# Patient Record
Sex: MALE | Race: WHITE | NOT HISPANIC OR LATINO | Employment: OTHER | URBAN - METROPOLITAN AREA
[De-identification: names, ages, dates, MRNs, and addresses within clinical notes are randomized per-mention and may not be internally consistent; named-entity substitution may affect disease eponyms.]

---

## 2017-03-20 ENCOUNTER — CONVERSION ENCOUNTER (OUTPATIENT)
Dept: OTHER | Facility: OTHER | Age: 67
End: 2017-03-20

## 2017-03-20 LAB
T4 FREE SERPL-MCNC: 1.36 NG/DL (ref 0.78–2.19)
TSH SERPL DL<=0.005 MIU/L-ACNC: 0.66 MIU/ML (ref 0.47–4.68)

## 2017-03-27 ENCOUNTER — OFFICE VISIT (OUTPATIENT)
Dept: FAMILY MEDICINE CLINIC | Facility: CLINIC | Age: 67
End: 2017-03-27

## 2017-03-27 ENCOUNTER — TRANSCRIBE ORDERS (OUTPATIENT)
Dept: LAB | Facility: HOSPITAL | Age: 67
End: 2017-03-27

## 2017-03-27 ENCOUNTER — LAB (OUTPATIENT)
Dept: LAB | Facility: HOSPITAL | Age: 67
End: 2017-03-27
Attending: INTERNAL MEDICINE

## 2017-03-27 ENCOUNTER — APPOINTMENT (OUTPATIENT)
Dept: LAB | Facility: HOSPITAL | Age: 67
End: 2017-03-27
Attending: INTERNAL MEDICINE

## 2017-03-27 VITALS
HEART RATE: 121 BPM | WEIGHT: 219 LBS | SYSTOLIC BLOOD PRESSURE: 148 MMHG | HEIGHT: 71 IN | DIASTOLIC BLOOD PRESSURE: 93 MMHG | BODY MASS INDEX: 30.66 KG/M2 | TEMPERATURE: 98.7 F | OXYGEN SATURATION: 96 % | RESPIRATION RATE: 16 BRPM

## 2017-03-27 DIAGNOSIS — R94.31 ABNORMAL EKG: ICD-10-CM

## 2017-03-27 DIAGNOSIS — E78.00 HYPERCHOLESTEROLEMIA: ICD-10-CM

## 2017-03-27 DIAGNOSIS — I15.2 HYPERTENSION DUE TO ENDOCRINE DISORDER: ICD-10-CM

## 2017-03-27 DIAGNOSIS — I15.2 ENDOCRINE HYPERTENSION: Primary | ICD-10-CM

## 2017-03-27 DIAGNOSIS — E05.90 HYPERTHYROIDISM: Primary | ICD-10-CM

## 2017-03-27 DIAGNOSIS — I15.2 ENDOCRINE HYPERTENSION: ICD-10-CM

## 2017-03-27 LAB
ALBUMIN SERPL-MCNC: 4.7 G/DL (ref 3.5–5)
ALBUMIN/GLOB SERPL: 1.3 G/DL (ref 1–2)
ALP SERPL-CCNC: 54 U/L (ref 38–126)
ALT SERPL W P-5'-P-CCNC: 58 U/L (ref 13–69)
ANION GAP SERPL CALCULATED.3IONS-SCNC: 13.9 MMOL/L
AST SERPL-CCNC: 34 U/L (ref 15–46)
BILIRUB SERPL-MCNC: 0.6 MG/DL (ref 0.2–1.3)
BUN BLD-MCNC: 14 MG/DL (ref 7–20)
BUN/CREAT SERPL: 12.7 (ref 6.3–21.9)
CALCIUM SPEC-SCNC: 9.8 MG/DL (ref 8.4–10.2)
CHLORIDE SERPL-SCNC: 105 MMOL/L (ref 98–107)
CO2 SERPL-SCNC: 28 MMOL/L (ref 26–30)
CREAT BLD-MCNC: 1.1 MG/DL (ref 0.6–1.3)
GFR SERPL CREATININE-BSD FRML MDRD: 67 ML/MIN/1.73
GLOBULIN UR ELPH-MCNC: 3.6 GM/DL
GLUCOSE BLD-MCNC: 94 MG/DL (ref 74–98)
POTASSIUM BLD-SCNC: 3.9 MMOL/L (ref 3.5–5.1)
PROT SERPL-MCNC: 8.3 G/DL (ref 6.3–8.2)
SODIUM BLD-SCNC: 143 MMOL/L (ref 137–145)
TROPONIN I SERPL-MCNC: <0.012 NG/ML (ref 0–0.03)

## 2017-03-27 PROCEDURE — 93000 ELECTROCARDIOGRAM COMPLETE: CPT | Performed by: INTERNAL MEDICINE

## 2017-03-27 PROCEDURE — 84484 ASSAY OF TROPONIN QUANT: CPT | Performed by: INTERNAL MEDICINE

## 2017-03-27 PROCEDURE — 99214 OFFICE O/P EST MOD 30 MIN: CPT | Performed by: INTERNAL MEDICINE

## 2017-03-27 PROCEDURE — 80053 COMPREHEN METABOLIC PANEL: CPT | Performed by: INTERNAL MEDICINE

## 2017-03-27 RX ORDER — CARVEDILOL 3.12 MG/1
3.12 TABLET ORAL 2 TIMES DAILY WITH MEALS
Qty: 60 TABLET | Refills: 5 | Status: SHIPPED | OUTPATIENT
Start: 2017-03-27 | End: 2018-06-04 | Stop reason: ALTCHOICE

## 2017-03-27 NOTE — PROGRESS NOTES
"Subjective   Patient ID: Mohsen Bucio is a 67 y.o. male Pt is here for management of multiple medical problems.    Chief Complaint   Patient presents with   • Hypertension     patient has had increase in his blood pressure levels x 3 months       History of Present Illness      Feels light headed.  X 2 month.   Thinks it is allergies.   Since last apt bp was high.  Remains elevated.   Night sweats.  Hot flash during the day.  Loose stools. Once a day.   No otc.      Witness apnea.  No sig fatigue.  Rested on getting up. occ snoring.        The following portions of the patient's history were reviewed and updated as appropriate: allergies, current medications, past family history, past medical history, past social history, past surgical history and problem list.      Review of Systems   Constitutional: Negative for fatigue.   Eyes: Positive for pain.   Respiratory: Negative for cough, shortness of breath and wheezing.    Cardiovascular: Positive for palpitations. Negative for chest pain.   Neurological: Positive for dizziness, light-headedness and headaches. Negative for facial asymmetry and weakness.       Objective     /93 (BP Location: Right arm, Patient Position: Sitting, Cuff Size: Adult)  Pulse (!) 121  Temp 98.7 °F (37.1 °C) (Oral)   Resp 16  Ht 71\" (180.3 cm)  Wt 219 lb (99.3 kg)  SpO2 96%  BMI 30.54 kg/m2  Physical Exam  General Appearance:    Alert, cooperative, no distress, appears stated age   Head:    Normocephalic, without obvious abnormality, atraumatic   Eyes:    PERRL, conjunctiva/corneas clear, EOM's intact           Ears:    Normal TM's and external ear canals, both ears   Nose:   Nares normal, septum midline, mucosa normal, no drainage    or sinus tenderness   Throat:   Lips, mucosa, and tongue normal; teeth and gums normal   Neck:   Supple, symmetrical, trachea midline, no adenopathy;        thyroid:  No enlargement/tenderness/nodules; no carotid    bruit or JVD   Back:     " Symmetric, no curvature, ROM normal, no CVA tenderness   Lungs:     Clear to auscultation bilaterally, respirations unlabored   Chest wall:    No tenderness or deformity   Heart:    Regular rate and rhythm, S1 and S2 normal, no murmur, rub   or gallop   Abdomen:     Soft, non-tender, bowel sounds active all four quadrants,     no masses, no organomegaly           Extremities:   Extremities normal, atraumatic, no cyanosis or edema   Pulses:   2+ and symmetric all extremities   Skin:   Skin color, texture, turgor normal, no rashes or lesions   Lymph nodes:   Cervical, supraclavicular, and axillary nodes normal   Neurologic:   CNII-XII intact. Normal strength, sensation and reflexes       throughout       Assessment/Plan     ECG 12 Lead  Date/Time: 3/27/2017 3:57 PM  Performed by: BENJAMIN CHERY  Authorized by: BENJAMIN CHERY   Comparison: not compared with previous ECG   Previous ECG: no previous ECG available  Rhythm: sinus tachycardia  BPM: 102  T depression: III                Mohsen was seen today for hypertension.    Diagnoses and all orders for this visit:    Hyperthyroidism    Hypercholesterolemia    Hypertension due to endocrine disorder  -     Catecholamine+VMA, 24-Hr Urine  -     Comprehensive Metabolic Panel  -     Metanephrines, Urine, 24 Hour  -     5 HIAA, Urine, Quantitative, Random  -     carvedilol (COREG) 3.125 MG tablet; Take 1 tablet by mouth 2 (Two) Times a Day With Meals.    Abnormal EKG  -     Ambulatory Referral to Cardiology  -     Troponin I  -     ECG 12 Lead      Return in about 3 weeks (around 4/17/2017).                   There are no Patient Instructions on file for this visit.

## 2017-03-29 PROCEDURE — 83835 ASSAY OF METANEPHRINES: CPT | Performed by: INTERNAL MEDICINE

## 2017-03-29 PROCEDURE — 82384 ASSAY THREE CATECHOLAMINES: CPT | Performed by: INTERNAL MEDICINE

## 2017-03-29 PROCEDURE — 81050 URINALYSIS VOLUME MEASURE: CPT | Performed by: INTERNAL MEDICINE

## 2017-03-29 PROCEDURE — 84585 ASSAY OF URINE VMA: CPT | Performed by: INTERNAL MEDICINE

## 2017-04-01 LAB
METANEPHS 24H UR-MRATE: 196 UG/24 HR (ref 45–290)
METANEPHS UR-MCNC: 128 UG/L
NORMETANEPHRINE 24H UR-MCNC: 221 UG/L
NORMETANEPHRINE 24H UR-MRATE: 339 UG/24 HR (ref 82–500)

## 2017-04-02 LAB
DOPAMINE 24H UR-MRATE: 309 UG/24 HR (ref 0–510)
DOPAMINE UR-MCNC: 201 UG/L
EPINEPH 24H UR-MRATE: 9 UG/24 HR (ref 0–20)
EPINEPH UR-MCNC: 6 UG/L
NOREPINEPH 24H UR-MRATE: 61 UG/24 HR (ref 0–135)
NOREPINEPH UR-MCNC: 40 UG/L
VMA 24H UR-MRATE: 4.8 MG/24 HR (ref 0–7.5)
VMA UR-MCNC: 3.1 MG/L

## 2017-04-05 ENCOUNTER — TELEPHONE (OUTPATIENT)
Dept: FAMILY MEDICINE CLINIC | Facility: CLINIC | Age: 67
End: 2017-04-05

## 2017-04-05 NOTE — TELEPHONE ENCOUNTER
Dr. Allan, I talked to Mr. Bucio in regards to his lab results, he stated he has not had anymore episodes but he wants to wait and discuss repeating the test when he sees you in a couple of weeks.

## 2017-04-19 ENCOUNTER — OFFICE VISIT (OUTPATIENT)
Dept: FAMILY MEDICINE CLINIC | Facility: CLINIC | Age: 67
End: 2017-04-19

## 2017-04-19 VITALS
SYSTOLIC BLOOD PRESSURE: 140 MMHG | DIASTOLIC BLOOD PRESSURE: 85 MMHG | RESPIRATION RATE: 16 BRPM | HEART RATE: 104 BPM | OXYGEN SATURATION: 98 % | HEIGHT: 71 IN | TEMPERATURE: 97.6 F | WEIGHT: 213 LBS | BODY MASS INDEX: 29.82 KG/M2

## 2017-04-19 DIAGNOSIS — J30.1 SEASONAL ALLERGIC RHINITIS DUE TO POLLEN: ICD-10-CM

## 2017-04-19 DIAGNOSIS — E78.00 HYPERCHOLESTEROLEMIA: Primary | ICD-10-CM

## 2017-04-19 DIAGNOSIS — E05.90 HYPERTHYROIDISM: ICD-10-CM

## 2017-04-19 DIAGNOSIS — E53.8 COBALAMIN DEFICIENCY: ICD-10-CM

## 2017-04-19 DIAGNOSIS — E88.09 HYPERPROTEINEMIA: ICD-10-CM

## 2017-04-19 DIAGNOSIS — E03.9 ACQUIRED HYPOTHYROIDISM: ICD-10-CM

## 2017-04-19 LAB
ALBUMIN SERPL-MCNC: 4.7 G/DL (ref 3.5–5)
ALBUMIN/GLOB SERPL: 1.3 G/DL (ref 1–2)
ALP SERPL-CCNC: 62 U/L (ref 38–126)
ALT SERPL-CCNC: 62 U/L (ref 13–69)
AST SERPL-CCNC: 36 U/L (ref 15–46)
BILIRUB SERPL-MCNC: 0.9 MG/DL (ref 0.2–1.3)
BUN SERPL-MCNC: 15 MG/DL (ref 7–20)
BUN/CREAT SERPL: 13.6 (ref 6.3–21.9)
CALCIUM SERPL-MCNC: 10.2 MG/DL (ref 8.4–10.2)
CHLORIDE SERPL-SCNC: 102 MMOL/L (ref 98–107)
CO2 SERPL-SCNC: 28 MMOL/L (ref 26–30)
CREAT SERPL-MCNC: 1.1 MG/DL (ref 0.6–1.3)
GLOBULIN SER CALC-MCNC: 3.5 GM/DL
GLUCOSE SERPL-MCNC: 99 MG/DL (ref 74–98)
POTASSIUM SERPL-SCNC: 5.3 MMOL/L (ref 3.5–5.1)
PROT SERPL-MCNC: 8.2 G/DL (ref 6.3–8.2)
SODIUM SERPL-SCNC: 143 MMOL/L (ref 137–145)

## 2017-04-19 PROCEDURE — G0402 INITIAL PREVENTIVE EXAM: HCPCS | Performed by: INTERNAL MEDICINE

## 2017-04-19 NOTE — PROGRESS NOTES
"Subjective   Patient ID: Mohsen Bucio is a 67 y.o. male Pt is here for management of multiple medical problems.    Chief Complaint   Patient presents with   • Hypertension     follow-up, blood pressure is up today,  patient has not been taking Carvedilol since 4/3/17.   • Hyperlipidemia     follow-up   • Hyperthyrodism     follo-up       History of Present Illness    Pt with elevated BP. Seen Dr Alvarado.  No meds adjustments.    No weakness no dizzyness. Of coreg for the past 1-2 weeks.       The following portions of the patient's history were reviewed and updated as appropriate: allergies, current medications, past family history, past medical history, past social history, past surgical history and problem list.      Review of Systems   Constitutional: Negative for fatigue.   HENT: Positive for congestion and postnasal drip.    All other systems reviewed and are negative.      Objective     /85  Pulse 104  Temp 97.6 °F (36.4 °C) (Oral)   Resp 16  Ht 71\" (180.3 cm)  Wt 213 lb (96.6 kg)  SpO2 98%  BMI 29.71 kg/m2  Physical Exam  General Appearance:    Alert, cooperative, no distress, appears stated age   Head:    Normocephalic, without obvious abnormality, atraumatic   Eyes:    PERRL, conjunctiva/corneas clear, EOM's intact           Ears:    Normal TM's and external ear canals, both ears   Nose:   Nares normal, septum midline, mucosa normal, no drainage    or sinus tenderness   Throat:   Lips, mucosa, and tongue normal; teeth and gums normal   Neck:   Supple, symmetrical, trachea midline, no adenopathy;        thyroid:  No enlargement/tenderness/nodules; no carotid    bruit or JVD   Back:     Symmetric, no curvature, ROM normal, no CVA tenderness   Lungs:     Clear to auscultation bilaterally, respirations unlabored   Chest wall:    No tenderness or deformity   Heart:    Regular rate and rhythm, S1 and S2 normal, no murmur, rub   or gallop   Abdomen:     Soft, non-tender, bowel sounds active " all four quadrants,     no masses, no organomegaly           Extremities:   Extremities normal, atraumatic, no cyanosis or edema   Pulses:   2+ and symmetric all extremities   Skin:   Skin color, texture, turgor normal, no rashes or lesions   Lymph nodes:   Cervical, supraclavicular, and axillary nodes normal   Neurologic:   CNII-XII intact. Normal strength, sensation and reflexes       throughout       Assessment/Plan     Get back on coreg.     Hyperproteinemia. recheck TP if elevated get spep.  Labs discussed.      Mohsen was seen today for hypertension, hyperlipidemia and hyperthyrodism.    Diagnoses and all orders for this visit:    Hypercholesterolemia    Hyperthyroidism    Acquired hypothyroidism    Cobalamin deficiency    Hyperproteinemia  -     Comprehensive Metabolic Panel    Return in about 6 months (around 10/19/2017).                   There are no Patient Instructions on file for this visit.

## 2017-04-19 NOTE — PROGRESS NOTES
QUICK REFERENCE INFORMATION:  The ABCs of the Annual Wellness Visit    Initial Medicare Wellness Visit    HEALTH RISK ASSESSMENT    1950    Recent Hospitalizations:  No recent hospitalization(s)..        Current Medical Providers:  Patient Care Team:  Johnathon Allan MD as PCP - General        Smoking Status:  History   Smoking Status   • Never Smoker   Smokeless Tobacco   • Never Used       Alcohol Consumption:  History   Alcohol Use   • Yes     Comment: history       Depression Screen:   No flowsheet data found.    Health Habits and Functional and Cognitive Screening:  No flowsheet data found.               Does the patient have evidence of cognitive impairment? No    Asiprin use counseling: Taking ASA appropriately as indicated      Recent Lab Results:    Visual Acuity:  No exam data present    Age-appropriate Screening Schedule:  Refer to the list below for future screening recommendations based on patient's age, sex and/or medical conditions. Orders for these recommended tests are listed in the plan section. The patient has been provided with a written plan.    Health Maintenance   Topic Date Due   • ZOSTER VACCINE  07/18/2016   • PNEUMOCOCCAL VACCINES (65+ LOW/MEDIUM RISK) (1 of 2 - PCV13) 02/07/2017   • LIPID PANEL  11/29/2017   • TDAP/TD VACCINES (2 - Td) 12/07/2021   • COLONOSCOPY  09/07/2026   • INFLUENZA VACCINE  Addressed        Subjective   History of Present Illness    Mohsen Bucio is a 67 y.o. male who presents for an Annual Wellness Visit.    The following portions of the patient's history were reviewed and updated as appropriate: allergies, current medications, past family history, past medical history, past social history, past surgical history and problem list.    Outpatient Medications Prior to Visit   Medication Sig Dispense Refill   • budesonide-formoterol (SYMBICORT) 160-4.5 MCG/ACT inhaler Inhale 2 puffs 2 (two) times a day. 1 inhaler 11   • Cyanocobalamin 2500 MCG sublingual  "tablet Daily 90 each 3   • zafirlukast (ACCOLATE) 20 MG tablet Take 1 tablet by mouth 2 (two) times a day. 60 tablet 11   • albuterol (PROVENTIL HFA;VENTOLIN HFA) 108 (90 BASE) MCG/ACT inhaler Inhale 2 puffs Every 4 (Four) Hours As Needed for wheezing. 1 inhaler 11   • aspirin 81 MG tablet Take  by mouth daily.     • carvedilol (COREG) 3.125 MG tablet Take 1 tablet by mouth 2 (Two) Times a Day With Meals. 60 tablet 5   • fluticasone (FLONASE) 50 MCG/ACT nasal spray into each nostril daily.     • ipratropium (ATROVENT) 0.06 % nasal spray into each nostril.     • pneumococcal conj. 13-valent (PREVNAR 13) vaccine Apply  to cheek.       No facility-administered medications prior to visit.        Patient Active Problem List   Diagnosis   • Atopic rhinitis   • Asthma   • Chronic cough   • Hypercholesterolemia   • Hyperthyroidism   • Hypothyroidism   • Uninodular goiter   • Multiple thyroid nodules   • Cobalamin deficiency       Advance Care Planning:  has an advance directive - a copy HAS NOT been provided. Have asked the patient to send this to us to add to record.    Identification of Risk Factors:  Risk factors include: weight , cardiovascular risk and polypharmacy.    Review of Systems    Compared to one year ago, the patient feels his physical health is the same.  Compared to one year ago, the patient feels his mental health is the same.    Objective     Physical Exam    Vitals:    04/19/17 1122 04/19/17 1147   BP: 132/99 140/85   BP Location: Left arm    Patient Position: Sitting    Cuff Size: Large Adult    Pulse: 104    Resp: 16    Temp: 97.6 °F (36.4 °C)    TempSrc: Oral    SpO2: 98%    Weight: 213 lb (96.6 kg)    Height: 71\" (180.3 cm)        Body mass index is 29.71 kg/(m^2).  Discussed the patient's BMI with him. The BMI is above average; BMI management plan is completed.    Assessment/Plan   Patient Self-Management and Personalized Health Advice  The patient has been provided with information about: diet, " exercise and weight management and preventive services including:   · Advance directive, Fall Risk assessment done.    Visit Diagnoses:    ICD-10-CM ICD-9-CM   1. Hypercholesterolemia E78.00 272.0   2. Hyperthyroidism E05.90 242.90   3. Acquired hypothyroidism E03.9 244.9   4. Cobalamin deficiency E53.8 266.2   5. Hyperproteinemia E88.09 273.8       Orders Placed This Encounter   Procedures   • Comprehensive Metabolic Panel       Outpatient Encounter Prescriptions as of 4/19/2017   Medication Sig Dispense Refill   • budesonide-formoterol (SYMBICORT) 160-4.5 MCG/ACT inhaler Inhale 2 puffs 2 (two) times a day. 1 inhaler 11   • Cyanocobalamin 2500 MCG sublingual tablet Daily 90 each 3   • zafirlukast (ACCOLATE) 20 MG tablet Take 1 tablet by mouth 2 (two) times a day. 60 tablet 11   • albuterol (PROVENTIL HFA;VENTOLIN HFA) 108 (90 BASE) MCG/ACT inhaler Inhale 2 puffs Every 4 (Four) Hours As Needed for wheezing. 1 inhaler 11   • aspirin 81 MG tablet Take  by mouth daily.     • carvedilol (COREG) 3.125 MG tablet Take 1 tablet by mouth 2 (Two) Times a Day With Meals. 60 tablet 5   • fluticasone (FLONASE) 50 MCG/ACT nasal spray into each nostril daily.     • ipratropium (ATROVENT) 0.06 % nasal spray into each nostril.     • pneumococcal conj. 13-valent (PREVNAR 13) vaccine Apply  to cheek.       No facility-administered encounter medications on file as of 4/19/2017.        Reviewed use of high risk medication in the elderly: yes  Reviewed for potential of harmful drug interactions in the elderly: yes    Follow Up:  Return in about 6 months (around 10/19/2017).     An After Visit Summary and PPPS with all of these plans were given to the patient.

## 2017-04-21 NOTE — PROGRESS NOTES
Please let them know the labs look good. Potasium is mild elevation.  If eating a high potasium diet or salt  Substitute. Did they have a hard time drawing blood? Normal kidney. Function so it should not and issue.

## 2017-07-11 RX ORDER — VITAMIN B COMPLEX
TABLET ORAL
Qty: 90 EACH | Refills: 3 | Status: SHIPPED | OUTPATIENT
Start: 2017-07-11

## 2017-07-31 RX ORDER — ZAFIRLUKAST 20 MG/1
20 TABLET, FILM COATED ORAL 2 TIMES DAILY
Qty: 60 TABLET | Refills: 11 | Status: SHIPPED | OUTPATIENT
Start: 2017-07-31 | End: 2022-09-02

## 2017-09-19 ENCOUNTER — LAB (OUTPATIENT)
Dept: FAMILY MEDICINE CLINIC | Facility: CLINIC | Age: 67
End: 2017-09-19

## 2017-09-19 DIAGNOSIS — E53.8 COBALAMIN DEFICIENCY: ICD-10-CM

## 2017-09-19 DIAGNOSIS — E03.9 ACQUIRED HYPOTHYROIDISM: ICD-10-CM

## 2017-09-19 DIAGNOSIS — E78.00 HYPERCHOLESTEROLEMIA: ICD-10-CM

## 2017-09-19 DIAGNOSIS — R33.9 URINARY RETENTION: Primary | ICD-10-CM

## 2017-09-19 DIAGNOSIS — Z12.5 SCREENING FOR PROSTATE CANCER: ICD-10-CM

## 2017-09-19 LAB
ALBUMIN SERPL-MCNC: 4.2 G/DL (ref 3.5–5)
ALBUMIN/GLOB SERPL: 1.5 G/DL (ref 1–2)
ALP SERPL-CCNC: 45 U/L (ref 38–126)
ALT SERPL-CCNC: 44 U/L (ref 13–69)
AST SERPL-CCNC: 26 U/L (ref 15–46)
BASOPHILS # BLD AUTO: 0.06 10*3/MM3 (ref 0–0.2)
BASOPHILS NFR BLD AUTO: 1.3 % (ref 0–2.5)
BILIRUB SERPL-MCNC: 0.7 MG/DL (ref 0.2–1.3)
BUN SERPL-MCNC: 13 MG/DL (ref 7–20)
BUN/CREAT SERPL: 13 (ref 6.3–21.9)
CALCIUM SERPL-MCNC: 9.7 MG/DL (ref 8.4–10.2)
CHLORIDE SERPL-SCNC: 106 MMOL/L (ref 98–107)
CHOLEST SERPL-MCNC: 244 MG/DL (ref 0–199)
CO2 SERPL-SCNC: 26 MMOL/L (ref 26–30)
CREAT SERPL-MCNC: 1 MG/DL (ref 0.6–1.3)
EOSINOPHIL # BLD AUTO: 0.19 10*3/MM3 (ref 0–0.7)
EOSINOPHIL NFR BLD AUTO: 4.3 % (ref 0–7)
ERYTHROCYTE [DISTWIDTH] IN BLOOD BY AUTOMATED COUNT: 13.4 % (ref 11.5–14.5)
GLOBULIN SER CALC-MCNC: 2.8 GM/DL
GLUCOSE SERPL-MCNC: 102 MG/DL (ref 74–98)
HCT VFR BLD AUTO: 46.3 % (ref 42–52)
HDLC SERPL-MCNC: 35 MG/DL (ref 40–60)
HGB BLD-MCNC: 15.1 G/DL (ref 14–18)
IMM GRANULOCYTES # BLD: 0.02 10*3/MM3 (ref 0–0.06)
IMM GRANULOCYTES NFR BLD: 0.4 % (ref 0–0.6)
LDLC SERPL CALC-MCNC: 176 MG/DL (ref 0–99)
LYMPHOCYTES # BLD AUTO: 1.41 10*3/MM3 (ref 0.6–3.4)
LYMPHOCYTES NFR BLD AUTO: 31.7 % (ref 10–50)
MCH RBC QN AUTO: 28.8 PG (ref 27–31)
MCHC RBC AUTO-ENTMCNC: 32.6 G/DL (ref 30–37)
MCV RBC AUTO: 88.4 FL (ref 80–94)
MONOCYTES # BLD AUTO: 0.43 10*3/MM3 (ref 0–0.9)
MONOCYTES NFR BLD AUTO: 9.7 % (ref 0–12)
NEUTROPHILS # BLD AUTO: 2.34 10*3/MM3 (ref 2–6.9)
NEUTROPHILS NFR BLD AUTO: 52.6 % (ref 37–80)
NRBC BLD AUTO-RTO: 0 /100 WBC (ref 0–0)
PLATELET # BLD AUTO: 294 10*3/MM3 (ref 130–400)
POTASSIUM SERPL-SCNC: 4.8 MMOL/L (ref 3.5–5.1)
PROT SERPL-MCNC: 7 G/DL (ref 6.3–8.2)
PSA SERPL-MCNC: 1.71 NG/ML (ref 0.06–4)
RBC # BLD AUTO: 5.24 10*6/MM3 (ref 4.7–6.1)
SODIUM SERPL-SCNC: 141 MMOL/L (ref 137–145)
T4 FREE SERPL-MCNC: 1.08 NG/DL (ref 0.78–2.19)
TRIGL SERPL-MCNC: 166 MG/DL
TSH SERPL DL<=0.005 MIU/L-ACNC: 0.2 MIU/ML (ref 0.47–4.68)
VIT B12 SERPL-MCNC: >1000 PG/ML (ref 239–931)
VLDLC SERPL CALC-MCNC: 33.2 MG/DL
WBC # BLD AUTO: 4.45 10*3/MM3 (ref 4.8–10.8)

## 2017-10-19 ENCOUNTER — OFFICE VISIT (OUTPATIENT)
Dept: FAMILY MEDICINE CLINIC | Facility: CLINIC | Age: 67
End: 2017-10-19

## 2017-10-19 VITALS
TEMPERATURE: 97.4 F | BODY MASS INDEX: 30.38 KG/M2 | HEIGHT: 71 IN | OXYGEN SATURATION: 99 % | WEIGHT: 217 LBS | SYSTOLIC BLOOD PRESSURE: 136 MMHG | HEART RATE: 79 BPM | DIASTOLIC BLOOD PRESSURE: 67 MMHG

## 2017-10-19 DIAGNOSIS — E78.00 HYPERCHOLESTEROLEMIA: ICD-10-CM

## 2017-10-19 DIAGNOSIS — Z23 NEED FOR INFLUENZA VACCINATION: ICD-10-CM

## 2017-10-19 DIAGNOSIS — E03.9 ACQUIRED HYPOTHYROIDISM: Primary | ICD-10-CM

## 2017-10-19 PROCEDURE — 90662 IIV NO PRSV INCREASED AG IM: CPT | Performed by: INTERNAL MEDICINE

## 2017-10-19 PROCEDURE — G0008 ADMIN INFLUENZA VIRUS VAC: HCPCS | Performed by: INTERNAL MEDICINE

## 2017-10-19 PROCEDURE — 99214 OFFICE O/P EST MOD 30 MIN: CPT | Performed by: INTERNAL MEDICINE

## 2017-10-19 RX ORDER — LOVASTATIN 10 MG/1
10 TABLET ORAL NIGHTLY
Qty: 30 TABLET | Refills: 5 | Status: SHIPPED | OUTPATIENT
Start: 2017-10-19 | End: 2018-06-04 | Stop reason: ALTCHOICE

## 2017-10-19 NOTE — PROGRESS NOTES
Subjective     Patient ID: Mohsen Bucio is a 67 y.o. male. Patient is here for management of multiple medical problems.     Chief Complaint   Patient presents with   • Follow-up     discuss labs     Groin Pain   The patient's pertinent negatives include no genital injury, genital itching, penile discharge, priapism or scrotal swelling. This is a recurrent problem. The current episode started more than 1 year ago. The problem occurs intermittently. The problem has been waxing and waning. Pertinent negatives include no abdominal pain, anorexia, chest pain, chills, constipation, coughing, dysuria, flank pain, frequency, headaches, hematuria or hesitancy. Nothing aggravates the symptoms. He has tried nothing for the symptoms. There is no history of BPH.      B/l foot pain.   Thinks it is heal spurs.  Duration 1 yr. Wax and wanes. Stretching helps. No otc.           The following portions of the patient's history were reviewed and updated as appropriate: allergies, current medications, past family history, past medical history, past social history, past surgical history and problem list.    Review of Systems   Constitutional: Negative for chills.   Respiratory: Negative for cough.    Cardiovascular: Negative for chest pain.   Gastrointestinal: Negative for abdominal pain, anorexia and constipation.   Genitourinary: Negative for difficulty urinating, discharge, dysuria, flank pain, frequency, hesitancy and scrotal swelling.   Musculoskeletal: Positive for arthralgias and joint swelling. Negative for back pain and gait problem.   Neurological: Negative for headaches.       Current Outpatient Prescriptions:   •  aspirin 81 MG tablet, Take  by mouth daily., Disp: , Rfl:   •  carvedilol (COREG) 3.125 MG tablet, Take 1 tablet by mouth 2 (Two) Times a Day With Meals., Disp: 60 tablet, Rfl: 5  •  Cyanocobalamin 2500 MCG sublingual tablet, Daily, Disp: 90 each, Rfl: 3  •  mometasone-formoterol (DULERA 100) 100-5 MCG/ACT  "inhaler, Inhale 2 puffs 2 (Two) Times a Day., Disp: , Rfl:   •  zafirlukast (ACCOLATE) 20 MG tablet, Take 1 tablet by mouth 2 (Two) Times a Day., Disp: 60 tablet, Rfl: 11  •  lovastatin (MEVACOR) 10 MG tablet, Take 1 tablet by mouth Every Night., Disp: 30 tablet, Rfl: 5    Objective      Blood pressure 136/67, pulse 79, temperature 97.4 °F (36.3 °C), height 71\" (180.3 cm), weight 217 lb (98.4 kg), SpO2 99 %.    Physical Exam     General Appearance:    Alert, cooperative, no distress, appears stated age   Head:    Normocephalic, without obvious abnormality, atraumatic   Eyes:    PERRL, conjunctiva/corneas clear, EOM's intact   Ears:    Normal TM's and external ear canals, both ears   Nose:   Nares normal, septum midline, mucosa normal, no drainage   or sinus tenderness   Throat:   Lips, mucosa, and tongue normal; teeth and gums normal   Neck:   Supple, symmetrical, trachea midline, no adenopathy;        thyroid:  No enlargement/tenderness/nodules; no carotid    bruit or JVD   Back:     Symmetric, no curvature, ROM normal, no CVA tenderness   Lungs:     Clear to auscultation bilaterally, respirations unlabored   Chest wall:    No tenderness or deformity   Heart:    Regular rate and rhythm, S1 and S2 normal, no murmur,        rub or gallop   Abdomen:     Soft, non-tender, bowel sounds active all four quadrants,     no masses, no organomegaly. No groin hernia.      Extremities:   Mild ttp in heals b/l.    Pulses:   2+ and symmetric all extremities   Skin:   Skin color, texture, turgor normal, no rashes or lesions   Lymph nodes:   Cervical, supraclavicular, and axillary nodes normal   Neurologic:   CNII-XII intact. Normal strength, sensation and reflexes       throughout      Results for orders placed or performed in visit on 09/19/17   PSA   Result Value Ref Range    PSA 1.710 0.060 - 4.000 ng/mL   Vitamin B12   Result Value Ref Range    Vitamin B-12 >1000 (H) 239 - 931 pg/mL   T4, free   Result Value Ref Range    Free " T4 1.08 0.78 - 2.19 ng/dL   TSH   Result Value Ref Range    TSH 0.198 (L) 0.470 - 4.680 mIU/mL   Comprehensive Metabolic Panel   Result Value Ref Range    Glucose 102 (H) 74 - 98 mg/dL    BUN 13 7 - 20 mg/dL    Creatinine 1.00 0.60 - 1.30 mg/dL    eGFR Non African Am 75 >60 mL/min/1.73    eGFR African Am 91 >60 mL/min/1.73    BUN/Creatinine Ratio 13.0 6.3 - 21.9    Sodium 141 137 - 145 mmol/L    Potassium 4.8 3.5 - 5.1 mmol/L    Chloride 106 98 - 107 mmol/L    Total CO2 26.0 26.0 - 30.0 mmol/L    Calcium 9.7 8.4 - 10.2 mg/dL    Total Protein 7.0 6.3 - 8.2 g/dL    Albumin 4.20 3.50 - 5.00 g/dL    Globulin 2.8 gm/dL    A/G Ratio 1.5 1.0 - 2.0 g/dL    Total Bilirubin 0.7 0.2 - 1.3 mg/dL    Alkaline Phosphatase 45 38 - 126 U/L    AST (SGOT) 26 15 - 46 U/L    ALT (SGPT) 44 13 - 69 U/L   Lipid Panel   Result Value Ref Range    Total Cholesterol 244 (H) 0 - 199 mg/dL    Triglycerides 166 (H) <150 mg/dL    HDL Cholesterol 35 (L) 40 - 60 mg/dL    VLDL Cholesterol 33.2 mg/dL    LDL Cholesterol  176 (H) 0 - 99 mg/dL   CBC & Differential   Result Value Ref Range    WBC 4.45 (L) 4.80 - 10.80 10*3/mm3    RBC 5.24 4.70 - 6.10 10*6/mm3    Hemoglobin 15.1 14.0 - 18.0 g/dL    Hematocrit 46.3 42.0 - 52.0 %    MCV 88.4 80.0 - 94.0 fL    MCH 28.8 27.0 - 31.0 pg    MCHC 32.6 30.0 - 37.0 g/dL    RDW 13.4 11.5 - 14.5 %    Platelets 294 130 - 400 10*3/mm3    Neutrophil Rel % 52.6 37.0 - 80.0 %    Lymphocyte Rel % 31.7 10.0 - 50.0 %    Monocyte Rel % 9.7 0.0 - 12.0 %    Eosinophil Rel % 4.3 0.0 - 7.0 %    Basophil Rel % 1.3 0.0 - 2.5 %    Neutrophils Absolute 2.34 2.00 - 6.90 10*3/mm3    Lymphocytes Absolute 1.41 0.60 - 3.40 10*3/mm3    Monocytes Absolute 0.43 0.00 - 0.90 10*3/mm3    Eosinophils Absolute 0.19 0.00 - 0.70 10*3/mm3    Basophils Absolute 0.06 0.00 - 0.20 10*3/mm3    Immature Granulocyte Rel % 0.4 0.0 - 0.6 %    Immature Grans Absolute 0.02 0.00 - 0.06 10*3/mm3    nRBC 0.0 0.0 - 0.0 /100 WBC         Assessment/Plan   Failed many  statins including zetia.  Will try lovastatin as a last try. inj repatha. Cost prohibitive in past.  Labs discussed.    Heal pain likely spur or tendinitis. Trial of pain gel rx sent to [facility].     Mohsen was seen today for follow-up.    Diagnoses and all orders for this visit:    Acquired hypothyroidism    Hypercholesterolemia  -     Lipid Panel  -     Comprehensive Metabolic Panel    Other orders  -     lovastatin (MEVACOR) 10 MG tablet; Take 1 tablet by mouth Every Night.      Return in about 6 months (around 4/19/2018).          There are no Patient Instructions on file for this visit.     Johnathon Allan MD    Assessment/Plan           Mohsen was seen today for follow-up.    Diagnoses and all orders for this visit:    Acquired hypothyroidism    Hypercholesterolemia  -     Lipid Panel  -     Comprehensive Metabolic Panel    Other orders  -     lovastatin (MEVACOR) 10 MG tablet; Take 1 tablet by mouth Every Night.      Return in about 6 months (around 4/19/2018).                   There are no Patient Instructions on file for this visit.

## 2017-10-23 ENCOUNTER — PATIENT MESSAGE (OUTPATIENT)
Dept: FAMILY MEDICINE CLINIC | Facility: CLINIC | Age: 67
End: 2017-10-23

## 2017-11-06 ENCOUNTER — TELEPHONE (OUTPATIENT)
Dept: FAMILY MEDICINE CLINIC | Facility: CLINIC | Age: 67
End: 2017-11-06

## 2017-11-06 DIAGNOSIS — M77.31 BILATERAL CALCANEAL SPURS: Primary | ICD-10-CM

## 2017-11-06 DIAGNOSIS — M77.32 BILATERAL CALCANEAL SPURS: Primary | ICD-10-CM

## 2017-11-15 DIAGNOSIS — M79.672 BILATERAL FOOT PAIN: Primary | ICD-10-CM

## 2017-11-15 DIAGNOSIS — M79.671 BILATERAL FOOT PAIN: Primary | ICD-10-CM

## 2017-11-16 ENCOUNTER — OFFICE VISIT (OUTPATIENT)
Dept: ORTHOPEDIC SURGERY | Facility: CLINIC | Age: 67
End: 2017-11-16

## 2017-11-16 VITALS — HEIGHT: 71 IN | RESPIRATION RATE: 18 BRPM | BODY MASS INDEX: 30.52 KG/M2 | WEIGHT: 218 LBS

## 2017-11-16 DIAGNOSIS — M76.61 ACHILLES TENDINITIS OF BOTH LOWER EXTREMITIES: Primary | ICD-10-CM

## 2017-11-16 DIAGNOSIS — M76.62 ACHILLES TENDINITIS OF BOTH LOWER EXTREMITIES: Primary | ICD-10-CM

## 2017-11-16 PROCEDURE — 99203 OFFICE O/P NEW LOW 30 MIN: CPT | Performed by: PODIATRIST

## 2017-11-16 RX ORDER — MELOXICAM 7.5 MG/1
7.5 TABLET ORAL DAILY
Qty: 30 TABLET | Refills: 0 | Status: SHIPPED | OUTPATIENT
Start: 2017-11-16 | End: 2018-06-04

## 2017-11-16 NOTE — PROGRESS NOTES
Subjective   Patient ID: Mohsen Bucio is a 67 y.o. male   Pain of the Right Foot; Pain of the Left Foot; and Consult (Is being seen today at the request of Dr. Johnathon Allan MD )  He comes in today with about a year and a half history of posterior right heel pain.  He says he's had little bit of problems with both feet and even had pain in the bottom of his feet at times but for the most part is all isolated to the back of the right heel.  Says he's had no previous treatment.  It does limit his activities and certain things he's postponed due to but complains of fairly constant stabbing aching pain in the back of his right heel that is alleviated by rest and ice.    History of Present Illness       Pain Location: Foot  Pain Orientation: Left, Right     Pain Descriptors: Aching, Stabbing     Pain Onset: Ongoing  Date Pain First Started:  (March 2017)              Pain Intervention(s): Rest, Cold applied          Review of Systems   Constitutional: Negative for diaphoresis, fever and unexpected weight change.   HENT: Negative for dental problem and sore throat.    Eyes: Negative for visual disturbance.   Respiratory: Negative for shortness of breath.    Cardiovascular: Negative for chest pain.   Gastrointestinal: Negative for abdominal pain, constipation, diarrhea, nausea and vomiting.   Genitourinary: Negative for difficulty urinating and frequency.   Neurological: Negative for headaches.   Hematological: Does not bruise/bleed easily.   All other systems reviewed and are negative.      Past Medical History:   Diagnosis Date   • Asthma    • Bronchitis    • Bursitis    • Chest pain    • Degenerative disc disease, cervical    • Graves disease    • Hyperlipidemia    • Hyperthyroidism    • Myalgia    • Myositis    • Neuroma of foot    • Tennis elbow         Past Surgical History:   Procedure Laterality Date   • APPENDECTOMY     • COLONOSCOPY     • HERNIA REPAIR     • INGUINAL HERNIA REPAIR     • TONSILLECTOMY          Allergies   Allergen Reactions   • Atorvastatin    • Penicillins    • Pitavastatin    • Pravastatin    • Rosuvastatin    • Simvastatin        Family History   Problem Relation Age of Onset   • Benign prostatic hyperplasia Father    • Stroke Father    • Stroke Other    • Cancer Other        Social History     Social History   • Marital status:      Spouse name: N/A   • Number of children: N/A   • Years of education: N/A     Occupational History   • retired      Social History Main Topics   • Smoking status: Never Smoker   • Smokeless tobacco: Never Used   • Alcohol use Yes      Comment: history   • Drug use: No   • Sexual activity: Not on file     Other Topics Concern   • Not on file     Social History Narrative       Counseling given: Not Answered       I have reviewed all of the above social hx, family hx, surgical hx, medications, allergies & ROS and confirm that it is accurate.  Objective   Physical Exam   Constitutional: He is oriented to person, place, and time. He appears well-developed and well-nourished.   HENT:   Head: Normocephalic and atraumatic.   Nose: Nose normal.   Eyes: Conjunctivae and EOM are normal. Pupils are equal, round, and reactive to light.   Neck: Normal range of motion.   Pulmonary/Chest: Effort normal.   Abdominal: Soft.   Neurological: He is alert and oriented to person, place, and time.   Skin: Skin is warm.   Psychiatric: He has a normal mood and affect. His behavior is normal. Judgment and thought content normal.   Nursing note reviewed.    Right Ankle Exam     Range of Motion   Dorsiflexion:  0 (PAINFUL ) abnormal   Plantar flexion:  20 normal     Muscle Strength   Dorsiflexion:  5/5  Plantar flexion:  5/5  Gastrocsoleus:  5/5  Other   Sensation: normal  Pulse: present     Comments:  Patient has ttp at the achilles tendon insertion on the posterior calcaneus, with edema and a large bulb present.  Onofre test is negative  There is no defect or delve in the tendon  itself          Right Ankle Exam Comments  Patient has ttp at the achilles tendon insertion on the posterior calcaneus, with edema and a large bulb present.  Onofre test is negative  There is no defect or delve in the tendon itself          He has 0° of ankle joint dorsiflexion.  There is increased medial longitudinal arch with cavovarus foot type.  Assessment/Plan right insertional Achilles tendinitis, cavus foot  Independent Review of Radiographic Studies:    Three-view x-rays of the right and left foot taken today for pain show slightly increased intermetatarsal angles with slight lateral deviation of the hallux.  The right side has a much more exaggerated and prominent posterior calcaneal exostosis compared to the left.  No comparison films available.  No fracture.  No tumor.  No bony lesion.  Laboratory and Other Studies:     Medical Decision Making:        Procedures  [] No procedures were performed in office today.    Mohsen was seen today for consult, pain and pain.    Diagnoses and all orders for this visit:    Achilles tendinitis of both lower extremities  -     Ambulatory Referral to Physical Therapy Evaluate and treat    Other orders  -     diclofenac (VOLTAREN) 1 % gel gel; Apply 4 g topically 4 (Four) Times a Day As Needed (pain).  -     meloxicam (MOBIC) 7.5 MG tablet; Take 1 tablet by mouth Daily.        Recommendations/Plan:  I discussed with the patient the etiology and pathology of Achilles tendinitis and the difficulty in treating it conservatively.  I recommend we start with a heel lift as well as physical therapy and oral and topical anti-inflammatories.  I explained that the Achilles tendon cannot be injected with steroid due to the potential for rupture.  I explained that this can be a chronic ongoing problems that in many instances results and the surgical intervention.  I will have him started in physical therapy 2-3 times a week for 6 weeks and send anti-inflammatory medications to the  pharmacy.  I recommended Rice as needed.  Good supportive shoes and/or open back shoes as tolerated.  Follow-up in 3-4 weeks.      Return in about 4 weeks (around 12/14/2017).  Patient agreeable to call or return sooner for any concerns.

## 2017-12-19 ENCOUNTER — OFFICE VISIT (OUTPATIENT)
Dept: ORTHOPEDIC SURGERY | Facility: CLINIC | Age: 67
End: 2017-12-19

## 2017-12-19 VITALS — BODY MASS INDEX: 30.94 KG/M2 | RESPIRATION RATE: 16 BRPM | WEIGHT: 221 LBS | HEIGHT: 71 IN

## 2017-12-19 DIAGNOSIS — M76.62 ACHILLES TENDINITIS OF BOTH LOWER EXTREMITIES: Primary | ICD-10-CM

## 2017-12-19 DIAGNOSIS — M76.61 ACHILLES TENDINITIS OF BOTH LOWER EXTREMITIES: Primary | ICD-10-CM

## 2017-12-19 PROCEDURE — 99213 OFFICE O/P EST LOW 20 MIN: CPT | Performed by: PODIATRIST

## 2017-12-19 NOTE — PROGRESS NOTES
Subjective   Patient ID: Mohsen Bucio is a 67 y.o. male   Follow-up of the Left Foot and Follow-up of the Right Foot  Returns back today.  States he is better.  Says he still has instances of pain and certain things that make it hurt but he does think the treatment to this point is helping.  He says his insurance denied the cream I prescribed so he has not used that.  Says he has issues with stretching as well as heel raises and therapy.  He thinks he has been to about 5-6 visits to this point.    History of Present Illness    Pain Score: 2  Pain Location: Foot  Pain Orientation: Right, Left     Pain Descriptors: Aching  Pain Frequency: Intermittent        Clinical Progression: Gradually improving  Aggravating Factors: Walking, Standing        Pain Intervention(s): Rest  Result of Injury: No  Work-Related Injury: No    Review of Systems   Constitutional: Negative for diaphoresis, fever and unexpected weight change.   HENT: Negative for dental problem and sore throat.    Eyes: Negative for visual disturbance.   Respiratory: Negative for shortness of breath.    Cardiovascular: Negative for chest pain.   Gastrointestinal: Negative for abdominal pain, constipation, diarrhea, nausea and vomiting.   Genitourinary: Negative for difficulty urinating and frequency.   Neurological: Negative for headaches.   Hematological: Does not bruise/bleed easily.   All other systems reviewed and are negative.      Past Medical History:   Diagnosis Date   • Asthma    • Bronchitis    • Bursitis    • Chest pain    • Degenerative disc disease, cervical    • Graves disease    • Hyperlipidemia    • Hyperthyroidism    • Myalgia    • Myositis    • Neuroma of foot    • Tennis elbow         Past Surgical History:   Procedure Laterality Date   • APPENDECTOMY     • COLONOSCOPY     • HERNIA REPAIR     • INGUINAL HERNIA REPAIR     • TONSILLECTOMY         Allergies   Allergen Reactions   • Atorvastatin    • Penicillins    • Pitavastatin    •  Pravastatin    • Rosuvastatin    • Simvastatin        Family History   Problem Relation Age of Onset   • Benign prostatic hyperplasia Father    • Stroke Father    • Stroke Other    • Cancer Other        Social History     Social History   • Marital status:      Spouse name: N/A   • Number of children: N/A   • Years of education: N/A     Occupational History   • retired      Social History Main Topics   • Smoking status: Never Smoker   • Smokeless tobacco: Never Used   • Alcohol use Yes      Comment: history   • Drug use: No   • Sexual activity: Not on file     Other Topics Concern   • Not on file     Social History Narrative       Counseling given: Not Answered       I have reviewed all of the above social hx, family hx, surgical hx, medications, allergies & ROS and confirm that it is accurate.  Objective   Physical Exam   Constitutional: He is oriented to person, place, and time. He appears well-developed and well-nourished.   HENT:   Head: Normocephalic and atraumatic.   Nose: Nose normal.   Eyes: Conjunctivae and EOM are normal. Pupils are equal, round, and reactive to light.   Neck: Normal range of motion.   Neurological: He is alert and oriented to person, place, and time.   Skin: Skin is warm.   Psychiatric: He has a normal mood and affect. His behavior is normal. Judgment and thought content normal.   Vitals reviewed.    Right Ankle Exam     Range of Motion   Dorsiflexion:  0 (PAINFUL ) abnormal   Plantar flexion:  20 normal     Muscle Strength   Dorsiflexion:  5/5  Plantar flexion:  5/5  Gastrocsoleus:  5/5  Other   Sensation: normal  Pulse: present     Comments:  Patient has ttp at the achilles tendon insertion on the posterior calcaneus, with edema and a large bulb present.  Onofre test is negative  There is no defect or delve in the tendon itself      Left Ankle Exam     Range of Motion   Dorsiflexion:  0 (PAINFUL AT EROM) abnormal   Plantar flexion:  20 normal     Muscle Strength   Dorsiflexion:   5/5   Plantar flexion:  5/5   Gastrocsoleus:  5/5    Other   Sensation: normal  Pulse: present    Comments:  Patient is ttp at the achilles tendon insertion on the posterior calcaneus with negative onofre test  No delve or defect within the tendon itself          Left Ankle Exam     Comments:  Patient is ttp at the achilles tendon insertion on the posterior calcaneus with negative onofre test  No delve or defect within the tendon itself    Right Ankle Exam Comments  Patient has ttp at the achilles tendon insertion on the posterior calcaneus, with edema and a large bulb present.  Onofre test is negative  There is no defect or delve in the tendon itself          Assessment/Plan bilateral insertional Achilles tendinitis  Independent Review of Radiographic Studies:      Laboratory and Other Studies:     Medical Decision Making:        Procedures  [] No procedures were performed in office today.    Mohsen was seen today for follow-up and follow-up.    Diagnoses and all orders for this visit:    Achilles tendinitis of both lower extremities    Other orders  -     Diclofenac Sodium (PENNSAID) 2 % solution; Place 1 dose on the skin 4 (Four) Times a Day As Needed (pain).        Recommendations/Plan:  I will try penNSAID and/or prior authorization for his Voltaren gel and hopefully we can get one of these for him.  I recommend he try a [place] sock as well.  I discussed Calumet sock and night splints with him.  I will also have his physical therapist start phonophoresis and/or iontophoresis and muscle scraping.  Continue with good shoes and heel lifts.  Follow-up in 3-4 weeks.  Continue therapy.  Continue rice as needed.    Return in about 4 weeks (around 1/16/2018).  Patient agreeable to call or return sooner for any concerns.

## 2018-05-29 LAB
ALBUMIN SERPL-MCNC: 4.3 G/DL (ref 3.5–5)
ALBUMIN/GLOB SERPL: 1.3 G/DL (ref 1–2)
ALP SERPL-CCNC: 48 U/L (ref 38–126)
ALT SERPL-CCNC: 42 U/L (ref 13–69)
AST SERPL-CCNC: 25 U/L (ref 15–46)
BILIRUB SERPL-MCNC: 0.6 MG/DL (ref 0.2–1.3)
BUN SERPL-MCNC: 17 MG/DL (ref 7–20)
BUN/CREAT SERPL: 17 (ref 6.3–21.9)
CALCIUM SERPL-MCNC: 9.5 MG/DL (ref 8.4–10.2)
CHLORIDE SERPL-SCNC: 105 MMOL/L (ref 98–107)
CHOLEST SERPL-MCNC: 254 MG/DL (ref 0–199)
CO2 SERPL-SCNC: 28 MMOL/L (ref 26–30)
CREAT SERPL-MCNC: 1 MG/DL (ref 0.6–1.3)
GFR SERPLBLD CREATININE-BSD FMLA CKD-EPI: 75 ML/MIN/1.73
GFR SERPLBLD CREATININE-BSD FMLA CKD-EPI: 91 ML/MIN/1.73
GLOBULIN SER CALC-MCNC: 3.2 GM/DL
GLUCOSE SERPL-MCNC: 98 MG/DL (ref 74–98)
HDLC SERPL-MCNC: 35 MG/DL (ref 40–60)
LDLC SERPL CALC-MCNC: 188 MG/DL (ref 0–99)
POTASSIUM SERPL-SCNC: 4.6 MMOL/L (ref 3.5–5.1)
PROT SERPL-MCNC: 7.5 G/DL (ref 6.3–8.2)
SODIUM SERPL-SCNC: 143 MMOL/L (ref 137–145)
TRIGL SERPL-MCNC: 157 MG/DL
VLDLC SERPL CALC-MCNC: 31.4 MG/DL

## 2018-06-04 ENCOUNTER — OFFICE VISIT (OUTPATIENT)
Dept: INTERNAL MEDICINE | Facility: CLINIC | Age: 68
End: 2018-06-04

## 2018-06-04 VITALS
BODY MASS INDEX: 29.96 KG/M2 | HEART RATE: 58 BPM | DIASTOLIC BLOOD PRESSURE: 70 MMHG | SYSTOLIC BLOOD PRESSURE: 132 MMHG | HEIGHT: 71 IN | TEMPERATURE: 97.6 F | RESPIRATION RATE: 16 BRPM | WEIGHT: 214 LBS | OXYGEN SATURATION: 97 %

## 2018-06-04 DIAGNOSIS — Z00.00 ROUTINE GENERAL MEDICAL EXAMINATION AT A HEALTH CARE FACILITY: ICD-10-CM

## 2018-06-04 DIAGNOSIS — E05.90 HYPERTHYROIDISM: Primary | ICD-10-CM

## 2018-06-04 DIAGNOSIS — E78.00 HYPERCHOLESTEROLEMIA: ICD-10-CM

## 2018-06-04 LAB
T4 FREE SERPL-MCNC: 1.16 NG/DL (ref 0.78–2.19)
TSH SERPL DL<=0.005 MIU/L-ACNC: 0.19 MIU/ML (ref 0.47–4.68)

## 2018-06-04 PROCEDURE — G0438 PPPS, INITIAL VISIT: HCPCS | Performed by: INTERNAL MEDICINE

## 2018-06-04 PROCEDURE — 99213 OFFICE O/P EST LOW 20 MIN: CPT | Performed by: INTERNAL MEDICINE

## 2018-06-04 NOTE — PROGRESS NOTES
QUICK REFERENCE INFORMATION:  The ABCs of the Annual Wellness Visit    Initial Medicare Wellness Visit    HEALTH RISK ASSESSMENT    1950    Recent Hospitalizations:  No hospitalization(s) within the last year..    Chronic pain: no pain reported.      Current Medical Providers:  Patient Care Team:  Johnathon Allan MD as PCP - General  Maria C Cho MD as PCP - Claims Attributed        Smoking Status:  History   Smoking Status   • Never Smoker   Smokeless Tobacco   • Never Used       Alcohol Consumption:  History   Alcohol Use   • Yes     Comment: history       Depression Screen:   PHQ-2/PHQ-9 Depression Screening 6/4/2018   Little interest or pleasure in doing things 0   Feeling down, depressed, or hopeless 0   Trouble falling or staying asleep, or sleeping too much -   Feeling tired or having little energy -   Poor appetite or overeating -   Feeling bad about yourself - or that you are a failure or have let yourself or your family down -   Trouble concentrating on things, such as reading the newspaper or watching television -   Moving or speaking so slowly that other people could have noticed. Or the opposite - being so fidgety or restless that you have been moving around a lot more than usual -   Thoughts that you would be better off dead, or of hurting yourself in some way -   Total Score 0       Health Habits and Functional and Cognitive Screening:  Functional & Cognitive Status 6/4/2018   Do you have difficulty preparing food and eating? No   Do you have difficulty bathing yourself, getting dressed or grooming yourself? No   Do you have difficulty using the toilet? No   Do you have difficulty moving around from place to place? No   Do you have trouble with steps or getting out of a bed or a chair? No   In the past year have you fallen or experienced a near fall? No   Current Diet Well Balanced Diet   Dental Exam Up to date   Eye Exam Up to date   Exercise (times per week) 4 times per week   Current  Exercise Activities Include Walking   Do you need help using the phone?  No   Are you deaf or do you have serious difficulty hearing?  No   Do you need help with transportation? No   Do you need help shopping? No   Do you need help preparing meals?  No   Do you need help with housework?  No   Do you need help with laundry? No   Do you need help taking your medications? No   Do you need help managing money? No   Do you ever drive or ride in a car without wearing a seat belt? No   Have you felt unusual stress, anger or loneliness in the last month? No   Who do you live with? Spouse   If you need help, do you have trouble finding someone available to you? No   Have you been bothered in the last four weeks by sexual problems? No   Do you have difficulty concentrating, remembering or making decisions? No           Does the patient have evidence of cognitive impairment? No    Asiprin use counseling: Taking ASA appropriately as indicated      Recent Lab Results:    Visual Acuity:  No exam data present    Age-appropriate Screening Schedule:  Refer to the list below for future screening recommendations based on patient's age, sex and/or medical conditions. Orders for these recommended tests are listed in the plan section. The patient has been provided with a written plan.    Health Maintenance   Topic Date Due   • ZOSTER VACCINE (1 of 2) 02/07/2002   • PNEUMOCOCCAL VACCINES (65+ LOW/MEDIUM RISK) (1 of 2 - PCV13) 02/07/2017   • INFLUENZA VACCINE  08/01/2018   • LIPID PANEL  05/29/2019   • TDAP/TD VACCINES (2 - Td) 12/07/2021   • COLONOSCOPY  09/07/2026        Subjective   History of Present Illness    Mohsen Bucio is a 68 y.o. male who presents for an Annual Wellness Visit.    The following portions of the patient's history were reviewed and updated as appropriate: allergies, current medications, past family history, past medical history, past social history, past surgical history and problem list.    Outpatient  "Medications Prior to Visit   Medication Sig Dispense Refill   • aspirin 81 MG tablet Take  by mouth daily.     • Cyanocobalamin 2500 MCG sublingual tablet Daily 90 each 3   • zafirlukast (ACCOLATE) 20 MG tablet Take 1 tablet by mouth 2 (Two) Times a Day. 60 tablet 11   • carvedilol (COREG) 3.125 MG tablet Take 1 tablet by mouth 2 (Two) Times a Day With Meals. 60 tablet 5   • diclofenac (VOLTAREN) 1 % gel gel Apply 4 g topically 4 (Four) Times a Day As Needed (pain). 100 g 1   • Diclofenac Sodium (PENNSAID) 2 % solution Place 1 dose on the skin 4 (Four) Times a Day As Needed (pain). 112 g 1   • lovastatin (MEVACOR) 10 MG tablet Take 1 tablet by mouth Every Night. 30 tablet 5   • meloxicam (MOBIC) 7.5 MG tablet Take 1 tablet by mouth Daily. 30 tablet 0   • mometasone-formoterol (DULERA 100) 100-5 MCG/ACT inhaler Inhale 2 puffs 2 (Two) Times a Day.       No facility-administered medications prior to visit.        Patient Active Problem List   Diagnosis   • Atopic rhinitis   • Asthma   • Chronic cough   • Hypercholesterolemia   • Hyperthyroidism   • Hypothyroidism   • Uninodular goiter   • Multiple thyroid nodules   • Cobalamin deficiency       Advance Care Planning:  has NO advance directive - not interested in additional information    Identification of Risk Factors:  Risk factors include: weight , cardiovascular risk and increased fall risk.    Review of Systems    Compared to one year ago, the patient feels his physical health is the same.  Compared to one year ago, the patient feels his mental health is the same.    Objective     Physical Exam    Vitals:    06/04/18 0822   BP: 132/70   BP Location: Left arm   Patient Position: Sitting   Cuff Size: Large Adult   Pulse: 58   Resp: 16   Temp: 97.6 °F (36.4 °C)   TempSrc: Oral   SpO2: 97%   Weight: 97.1 kg (214 lb)   Height: 180.3 cm (71\")       Patient's Body mass index is 29.85 kg/m². BMI is above normal parameters. Recommendations include: educational material and " exercise counseling.      Assessment/Plan   Patient Self-Management and Personalized Health Advice  The patient has been provided with information about: diet, exercise and fall prevention and preventive services including:   · Advance directive, Exercise counseling provided, Fall Risk assessment done, Fall Risk plan of care done, Pneumococcal vaccine , Zostavax vaccine (Herpes Zoster).    Visit Diagnoses:    ICD-10-CM ICD-9-CM   1. Hyperthyroidism E05.90 242.90   2. Hypercholesterolemia E78.00 272.0       Orders Placed This Encounter   Procedures   • TSH   • T4, Free       Outpatient Encounter Prescriptions as of 6/4/2018   Medication Sig Dispense Refill   • Fluticasone Furoate-Vilanterol (BREO ELLIPTA) 100-25 MCG/INH aerosol powder  Inhale 1 puff Daily.     • aspirin 81 MG tablet Take  by mouth daily.     • Cyanocobalamin 2500 MCG sublingual tablet Daily 90 each 3   • zafirlukast (ACCOLATE) 20 MG tablet Take 1 tablet by mouth 2 (Two) Times a Day. 60 tablet 11   • [DISCONTINUED] carvedilol (COREG) 3.125 MG tablet Take 1 tablet by mouth 2 (Two) Times a Day With Meals. 60 tablet 5   • [DISCONTINUED] diclofenac (VOLTAREN) 1 % gel gel Apply 4 g topically 4 (Four) Times a Day As Needed (pain). 100 g 1   • [DISCONTINUED] Diclofenac Sodium (PENNSAID) 2 % solution Place 1 dose on the skin 4 (Four) Times a Day As Needed (pain). 112 g 1   • [DISCONTINUED] lovastatin (MEVACOR) 10 MG tablet Take 1 tablet by mouth Every Night. 30 tablet 5   • [DISCONTINUED] meloxicam (MOBIC) 7.5 MG tablet Take 1 tablet by mouth Daily. 30 tablet 0   • [DISCONTINUED] mometasone-formoterol (DULERA 100) 100-5 MCG/ACT inhaler Inhale 2 puffs 2 (Two) Times a Day.       No facility-administered encounter medications on file as of 6/4/2018.        Reviewed use of high risk medication in the elderly: yes  Reviewed for potential of harmful drug interactions in the elderly: yes    Follow Up:  Return in about 6 months (around 12/4/2018).     An After Visit  Summary and PPPS with all of these plans were given to the patient.

## 2018-06-04 NOTE — PROGRESS NOTES
"Subjective     Patient ID: Mohsen Bucio is a 68 y.o. male. Patient is here for management of multiple medical problems.     Chief Complaint   Patient presents with   • Hypothyroidism     follow-up   • medication refills     patient needs refills on Zafirlukast sent to [facility]     Hyperlipidemia   This is a chronic problem. Recent lipid tests were reviewed and are high. He is currently on no antihyperlipidemic treatment. The current treatment provides no improvement of lipids. Compliance problems include medication side effects.  Risk factors for coronary artery disease include a sedentary lifestyle and stress.     Pt with hyperthyroidism. Asymptomatic. Here for evaluation.       Off thyroid meds x 2 years and still doing well.                The following portions of the patient's history were reviewed and updated as appropriate: allergies, current medications, past family history, past medical history, past social history, past surgical history and problem list.    Review of Systems   Psychiatric/Behavioral: Negative for sleep disturbance.   All other systems reviewed and are negative.      Current Outpatient Prescriptions:   •  Fluticasone Furoate-Vilanterol (BREO ELLIPTA) 100-25 MCG/INH aerosol powder , Inhale 1 puff Daily., Disp: , Rfl:   •  aspirin 81 MG tablet, Take  by mouth daily., Disp: , Rfl:   •  Cyanocobalamin 2500 MCG sublingual tablet, Daily, Disp: 90 each, Rfl: 3  •  zafirlukast (ACCOLATE) 20 MG tablet, Take 1 tablet by mouth 2 (Two) Times a Day., Disp: 60 tablet, Rfl: 11    Objective      Blood pressure 132/70, pulse 58, temperature 97.6 °F (36.4 °C), temperature source Oral, resp. rate 16, height 180.3 cm (71\"), weight 97.1 kg (214 lb), SpO2 97 %.    Physical Exam     General Appearance:    Alert, cooperative, no distress, appears stated age   Head:    Normocephalic, without obvious abnormality, atraumatic   Eyes:    PERRL, conjunctiva/corneas clear, EOM's intact   Ears:    Normal TM's and " external ear canals, both ears   Nose:   Nares normal, septum midline, mucosa normal, no drainage   or sinus tenderness   Throat:   Lips, mucosa, and tongue normal; teeth and gums normal   Neck:   Supple, symmetrical, trachea midline, no adenopathy;        thyroid:  No enlargement/tenderness/nodules; no carotid    bruit or JVD   Back:     Symmetric, no curvature, ROM normal, no CVA tenderness   Lungs:     Clear to auscultation bilaterally, respirations unlabored   Chest wall:    No tenderness or deformity   Heart:    Regular rate and rhythm, S1 and S2 normal, no murmur,        rub or gallop   Abdomen:     Soft, non-tender, bowel sounds active all four quadrants,     no masses, no organomegaly   Extremities:   Extremities normal, atraumatic, no cyanosis or edema   Pulses:   2+ and symmetric all extremities   Skin:   Skin color, texture, turgor normal, no rashes or lesions   Lymph nodes:   Cervical, supraclavicular, and axillary nodes normal   Neurologic:   CNII-XII intact. Normal strength, sensation and reflexes       throughout      Results for orders placed or performed in visit on 10/19/17   Lipid Panel   Result Value Ref Range    Total Cholesterol 254 (H) 0 - 199 mg/dL    Triglycerides 157 (H) <150 mg/dL    HDL Cholesterol 35 (L) 40 - 60 mg/dL    VLDL Cholesterol 31.4 mg/dL    LDL Cholesterol  188 (H) 0 - 99 mg/dL   Comprehensive Metabolic Panel   Result Value Ref Range    Glucose 98 74 - 98 mg/dL    BUN 17 7 - 20 mg/dL    Creatinine 1.00 0.60 - 1.30 mg/dL    eGFR Non African Am 75 >60 mL/min/1.73    eGFR African Am 91 >60 mL/min/1.73    BUN/Creatinine Ratio 17.0 6.3 - 21.9    Sodium 143 137 - 145 mmol/L    Potassium 4.6 3.5 - 5.1 mmol/L    Chloride 105 98 - 107 mmol/L    Total CO2 28.0 26.0 - 30.0 mmol/L    Calcium 9.5 8.4 - 10.2 mg/dL    Total Protein 7.5 6.3 - 8.2 g/dL    Albumin 4.30 3.50 - 5.00 g/dL    Globulin 3.2 gm/dL    A/G Ratio 1.3 1.0 - 2.0 g/dL    Total Bilirubin 0.6 0.2 - 1.3 mg/dL    Alkaline  Phosphatase 48 38 - 126 U/L    AST (SGOT) 25 15 - 46 U/L    ALT (SGPT) 42 13 - 69 U/L         Assessment/Plan   Pt has tried failed statins. Even at reduced dosing. Failed zetia.  Diet going as well as pt can comply.        Mohsen was seen today for hypothyroidism and medication refills.    Diagnoses and all orders for this visit:    Hyperthyroidism  -     TSH  -     T4, Free    Hypercholesterolemia    work harder on diet.      Return in about 6 months (around 12/4/2018).          There are no Patient Instructions on file for this visit.     Johnathon Allan MD    Assessment/Plan

## 2018-06-05 DIAGNOSIS — R79.89 LOW VITAMIN D LEVEL: ICD-10-CM

## 2018-06-05 DIAGNOSIS — E53.8 COBALAMIN DEFICIENCY: ICD-10-CM

## 2018-06-05 DIAGNOSIS — Z12.5 PROSTATE CANCER SCREENING: ICD-10-CM

## 2018-06-05 DIAGNOSIS — E78.00 HYPERCHOLESTEROLEMIA: ICD-10-CM

## 2018-06-05 DIAGNOSIS — E05.90 HYPERTHYROIDISM: Primary | ICD-10-CM

## 2018-10-09 ENCOUNTER — OFFICE VISIT (OUTPATIENT)
Dept: INTERNAL MEDICINE | Facility: CLINIC | Age: 68
End: 2018-10-09

## 2018-10-09 VITALS
DIASTOLIC BLOOD PRESSURE: 82 MMHG | HEIGHT: 71 IN | TEMPERATURE: 98 F | BODY MASS INDEX: 30.66 KG/M2 | SYSTOLIC BLOOD PRESSURE: 106 MMHG | HEART RATE: 88 BPM | WEIGHT: 219 LBS | OXYGEN SATURATION: 93 % | RESPIRATION RATE: 18 BRPM

## 2018-10-09 DIAGNOSIS — H61.23 BILATERAL IMPACTED CERUMEN: Primary | ICD-10-CM

## 2018-10-09 PROCEDURE — 99213 OFFICE O/P EST LOW 20 MIN: CPT | Performed by: FAMILY MEDICINE

## 2018-10-09 PROCEDURE — 69210 REMOVE IMPACTED EAR WAX UNI: CPT | Performed by: FAMILY MEDICINE

## 2018-10-09 NOTE — ASSESSMENT & PLAN NOTE
Hearing loss and feeling of fullness likely secondary to cerumen impaction.  Attempted removal.  Significant amount was removed from the canal, but some cerumen remained occluded TM's.  Advised patient to try an antihistamine as well and do ear rinses at home over the next week a couple of times a day with peroxide and rubbing alcohol.  If no improvement he is to come back to the office.

## 2018-10-09 NOTE — PATIENT INSTRUCTIONS
Try filling your ear canal with peroxide, let it sit for a minute and then drain back out.  Follow that with rubbing alcohol.  Do this a couple of times a day.  You can go ahead and start the steroids or try an antihistamine like allegra, zyrtec, or claritin, or even flonase with your other nasal spray.

## 2018-12-05 LAB
25(OH)D3+25(OH)D2 SERPL-MCNC: 43.9 NG/ML
ALBUMIN SERPL-MCNC: 4.4 G/DL (ref 3.5–5)
ALBUMIN/GLOB SERPL: 1.5 G/DL (ref 1–2)
ALP SERPL-CCNC: 42 U/L (ref 38–126)
ALT SERPL-CCNC: 40 U/L (ref 13–69)
AST SERPL-CCNC: 29 U/L (ref 15–46)
BASOPHILS # BLD AUTO: 0.06 10*3/MM3 (ref 0–0.2)
BASOPHILS NFR BLD AUTO: 1 % (ref 0–2.5)
BILIRUB SERPL-MCNC: 1 MG/DL (ref 0.2–1.3)
BUN SERPL-MCNC: 17 MG/DL (ref 7–20)
BUN/CREAT SERPL: 15.5 (ref 6.3–21.9)
CALCIUM SERPL-MCNC: 10.1 MG/DL (ref 8.4–10.2)
CHLORIDE SERPL-SCNC: 104 MMOL/L (ref 98–107)
CHOLEST SERPL-MCNC: 244 MG/DL (ref 0–199)
CO2 SERPL-SCNC: 29 MMOL/L (ref 26–30)
CREAT SERPL-MCNC: 1.1 MG/DL (ref 0.6–1.3)
EOSINOPHIL # BLD AUTO: 0.18 10*3/MM3 (ref 0–0.7)
EOSINOPHIL NFR BLD AUTO: 3 % (ref 0–7)
ERYTHROCYTE [DISTWIDTH] IN BLOOD BY AUTOMATED COUNT: 13.3 % (ref 11.5–14.5)
GLOBULIN SER CALC-MCNC: 2.9 GM/DL
GLUCOSE SERPL-MCNC: 97 MG/DL (ref 74–98)
HCT VFR BLD AUTO: 47.6 % (ref 42–52)
HDLC SERPL-MCNC: 36 MG/DL (ref 40–60)
HGB BLD-MCNC: 15.4 G/DL (ref 14–18)
IMM GRANULOCYTES # BLD: 0.02 10*3/MM3 (ref 0–0.06)
IMM GRANULOCYTES NFR BLD: 0.3 % (ref 0–0.6)
LDLC SERPL CALC-MCNC: 173 MG/DL (ref 0–99)
LYMPHOCYTES # BLD AUTO: 1.62 10*3/MM3 (ref 0.6–3.4)
LYMPHOCYTES NFR BLD AUTO: 27.3 % (ref 10–50)
MCH RBC QN AUTO: 29.2 PG (ref 27–31)
MCHC RBC AUTO-ENTMCNC: 32.4 G/DL (ref 30–37)
MCV RBC AUTO: 90.2 FL (ref 80–94)
MONOCYTES # BLD AUTO: 0.57 10*3/MM3 (ref 0–0.9)
MONOCYTES NFR BLD AUTO: 9.6 % (ref 0–12)
NEUTROPHILS # BLD AUTO: 3.49 10*3/MM3 (ref 2–6.9)
NEUTROPHILS NFR BLD AUTO: 58.8 % (ref 37–80)
NRBC BLD AUTO-RTO: 0 /100 WBC (ref 0–0)
PLATELET # BLD AUTO: 275 10*3/MM3 (ref 130–400)
POTASSIUM SERPL-SCNC: 5.4 MMOL/L (ref 3.5–5.1)
PROT SERPL-MCNC: 7.3 G/DL (ref 6.3–8.2)
PSA SERPL-MCNC: 1.88 NG/ML (ref 0.06–4)
RBC # BLD AUTO: 5.28 10*6/MM3 (ref 4.7–6.1)
SODIUM SERPL-SCNC: 141 MMOL/L (ref 137–145)
T3FREE SERPL-MCNC: 3 PG/ML (ref 2–4.4)
T4 FREE SERPL-MCNC: 1.1 NG/DL (ref 0.78–2.19)
TRIGL SERPL-MCNC: 177 MG/DL
TSH SERPL DL<=0.005 MIU/L-ACNC: 0.23 MIU/ML (ref 0.47–4.68)
VIT B12 SERPL-MCNC: 813 PG/ML (ref 239–931)
VLDLC SERPL CALC-MCNC: 35.4 MG/DL
WBC # BLD AUTO: 5.94 10*3/MM3 (ref 4.8–10.8)

## 2018-12-11 ENCOUNTER — OFFICE VISIT (OUTPATIENT)
Dept: INTERNAL MEDICINE | Facility: CLINIC | Age: 68
End: 2018-12-11

## 2018-12-11 VITALS
WEIGHT: 221 LBS | DIASTOLIC BLOOD PRESSURE: 84 MMHG | HEART RATE: 84 BPM | TEMPERATURE: 97.7 F | SYSTOLIC BLOOD PRESSURE: 130 MMHG | HEIGHT: 71 IN | OXYGEN SATURATION: 98 % | RESPIRATION RATE: 16 BRPM | BODY MASS INDEX: 30.94 KG/M2

## 2018-12-11 DIAGNOSIS — E78.00 HYPERCHOLESTEROLEMIA: Primary | ICD-10-CM

## 2018-12-11 DIAGNOSIS — E53.8 COBALAMIN DEFICIENCY: ICD-10-CM

## 2018-12-11 DIAGNOSIS — E05.90 HYPERTHYROIDISM: ICD-10-CM

## 2018-12-11 DIAGNOSIS — Z12.5 PROSTATE CANCER SCREENING: ICD-10-CM

## 2018-12-11 PROCEDURE — 99213 OFFICE O/P EST LOW 20 MIN: CPT | Performed by: INTERNAL MEDICINE

## 2018-12-11 RX ORDER — ALBUTEROL SULFATE 90 UG/1
2 AEROSOL, METERED RESPIRATORY (INHALATION) EVERY 4 HOURS PRN
COMMUNITY

## 2018-12-11 NOTE — PROGRESS NOTES
Subjective     Patient ID: Mohsen Bucio is a 68 y.o. male. Patient is here for management of multiple medical problems.     Chief Complaint   Patient presents with   • Hypothyroidism     6 month follow-up   • Hyperlipidemia     6 month follow-up     Hypothyroidism   This is a chronic problem. The current episode started more than 1 year ago. The problem occurs constantly. The problem has been unchanged. Pertinent negatives include no abdominal pain, change in bowel habit, congestion, coughing or diaphoresis. Nothing aggravates the symptoms. He has tried nothing for the symptoms. The treatment provided significant relief.   Hyperlipidemia   This is a chronic problem. The current episode started more than 1 year ago. Recent lipid tests were reviewed and are normal. Exacerbating diseases include hypothyroidism. There are no known factors aggravating his hyperlipidemia. He is currently on no antihyperlipidemic treatment. The current treatment provides moderate improvement of lipids. There are no compliance problems.  Risk factors for coronary artery disease include dyslipidemia and male sex.        The following portions of the patient's history were reviewed and updated as appropriate: allergies, current medications, past family history, past medical history, past social history, past surgical history and problem list.    Review of Systems   Constitutional: Negative for diaphoresis.   HENT: Negative for congestion.    Respiratory: Negative for cough.    Gastrointestinal: Negative for abdominal pain and change in bowel habit.       Current Outpatient Medications:   •  albuterol 108 (90 Base) MCG/ACT inhaler, Inhale 2 puffs Every 4 (Four) Hours As Needed for Wheezing., Disp: , Rfl:   •  aspirin 81 MG tablet, Take  by mouth daily., Disp: , Rfl:   •  Cyanocobalamin 2500 MCG sublingual tablet, Daily, Disp: 90 each, Rfl: 3  •  Fluticasone Furoate-Vilanterol (BREO ELLIPTA) 100-25 MCG/INH aerosol powder , Inhale 1 puff  "Daily., Disp: , Rfl:   •  zafirlukast (ACCOLATE) 20 MG tablet, Take 1 tablet by mouth 2 (Two) Times a Day., Disp: 60 tablet, Rfl: 11    Objective      Blood pressure 130/84, pulse 84, temperature 97.7 °F (36.5 °C), temperature source Oral, resp. rate 16, height 180.3 cm (71\"), weight 100 kg (221 lb), SpO2 98 %.    Physical Exam     General Appearance:    Alert, cooperative, no distress, appears stated age   Head:    Normocephalic, without obvious abnormality, atraumatic   Eyes:    PERRL, conjunctiva/corneas clear, EOM's intact   Ears:    Normal TM's and external ear canals, both ears   Nose:   Nares normal, septum midline, mucosa normal, no drainage   or sinus tenderness   Throat:   Lips, mucosa, and tongue normal; teeth and gums normal   Neck:   Supple, symmetrical, trachea midline, no adenopathy;        thyroid:  No enlargement/tenderness/nodules; no carotid    bruit or JVD   Back:     Symmetric, no curvature, ROM normal, no CVA tenderness   Lungs:     Clear to auscultation bilaterally, respirations unlabored   Chest wall:    No tenderness or deformity   Heart:    Regular rate and rhythm, S1 and S2 normal, no murmur,        rub or gallop   Abdomen:     Soft, non-tender, bowel sounds active all four quadrants,     no masses, no organomegaly   Extremities:   Extremities normal, atraumatic, no cyanosis or edema   Pulses:   2+ and symmetric all extremities   Skin:   Skin color, texture, turgor normal, no rashes or lesions   Lymph nodes:   Cervical, supraclavicular, and axillary nodes normal   Neurologic:   CNII-XII intact. Normal strength, sensation and reflexes       throughout      Results for orders placed or performed in visit on 06/05/18   TSH   Result Value Ref Range    TSH 0.228 (L) 0.470 - 4.680 mIU/mL   T4, Free   Result Value Ref Range    Free T4 1.10 0.78 - 2.19 ng/dL   T3, Free   Result Value Ref Range    T3, Free 3.0 2.0 - 4.4 pg/mL   Vitamin B12   Result Value Ref Range    Vitamin B-12 133 239 - 931 " pg/mL   Lipid Panel   Result Value Ref Range    Total Cholesterol 244 (H) 0 - 199 mg/dL    Triglycerides 177 (H) <150 mg/dL    HDL Cholesterol 36 (L) 40 - 60 mg/dL    VLDL Cholesterol 35.4 mg/dL    LDL Cholesterol  173 (H) 0 - 99 mg/dL   Comprehensive Metabolic Panel   Result Value Ref Range    Glucose 97 74 - 98 mg/dL    BUN 17 7 - 20 mg/dL    Creatinine 1.10 0.60 - 1.30 mg/dL    eGFR Non African Am 67 >60 mL/min/1.73    eGFR African Am 81 >60 mL/min/1.73    BUN/Creatinine Ratio 15.5 6.3 - 21.9    Sodium 141 137 - 145 mmol/L    Potassium 5.4 (H) 3.5 - 5.1 mmol/L    Chloride 104 98 - 107 mmol/L    Total CO2 29.0 26.0 - 30.0 mmol/L    Calcium 10.1 8.4 - 10.2 mg/dL    Total Protein 7.3 6.3 - 8.2 g/dL    Albumin 4.40 3.50 - 5.00 g/dL    Globulin 2.9 gm/dL    A/G Ratio 1.5 1.0 - 2.0 g/dL    Total Bilirubin 1.0 0.2 - 1.3 mg/dL    Alkaline Phosphatase 42 38 - 126 U/L    AST (SGOT) 29 15 - 46 U/L    ALT (SGPT) 40 13 - 69 U/L   Vitamin D 25 Hydroxy   Result Value Ref Range    25 Hydroxy, Vitamin D 43.9 ng/ml   PSA Screen   Result Value Ref Range    PSA 1.880 0.060 - 4.000 ng/mL   CBC & Differential   Result Value Ref Range    WBC 5.94 4.80 - 10.80 10*3/mm3    RBC 5.28 4.70 - 6.10 10*6/mm3    Hemoglobin 15.4 14.0 - 18.0 g/dL    Hematocrit 47.6 42.0 - 52.0 %    MCV 90.2 80.0 - 94.0 fL    MCH 29.2 27.0 - 31.0 pg    MCHC 32.4 30.0 - 37.0 g/dL    RDW 13.3 11.5 - 14.5 %    Platelets 275 130 - 400 10*3/mm3    Neutrophil Rel % 58.8 37.0 - 80.0 %    Lymphocyte Rel % 27.3 10.0 - 50.0 %    Monocyte Rel % 9.6 0.0 - 12.0 %    Eosinophil Rel % 3.0 0.0 - 7.0 %    Basophil Rel % 1.0 0.0 - 2.5 %    Neutrophils Absolute 3.49 2.00 - 6.90 10*3/mm3    Lymphocytes Absolute 1.62 0.60 - 3.40 10*3/mm3    Monocytes Absolute 0.57 0.00 - 0.90 10*3/mm3    Eosinophils Absolute 0.18 0.00 - 0.70 10*3/mm3    Basophils Absolute 0.06 0.00 - 0.20 10*3/mm3    Immature Granulocyte Rel % 0.3 0.0 - 0.6 %    Immature Grans Absolute 0.02 0.00 - 0.06 10*3/mm3     nRBC 0.0 0.0 - 0.0 /100 WBC         Assessment/Plan   Pt will get hep a and shingrex form [facility].         Mohsen was seen today for hypothyroidism and hyperlipidemia.    Diagnoses and all orders for this visit:    Hypercholesterolemia  -     Lipid Panel  -     CBC & Differential  -     Vitamin B12  -     Comprehensive Metabolic Panel  -     TSH  -     T4, Free    Cobalamin deficiency  -     CBC & Differential  -     Vitamin B12  -     Comprehensive Metabolic Panel  -     TSH  -     T4, Free    Hyperthyroidism  -     Comprehensive Metabolic Panel  -     TSH  -     T4, Free    Prostate cancer screening  -     PSA Screen      Return in about 1 year (around 12/11/2019).          There are no Patient Instructions on file for this visit.     Johnathon Allan MD    Assessment/Plan

## 2019-09-06 NOTE — PROGRESS NOTES
Mohsen Bucio is a 68 y.o. male.    Chief Complaint   Patient presents with   • Earache     Rt ear x 1 week        HPI   Patient reports they have not been feeling well for 1week(s). He admits to ear feeling plugged.  He reports decreased hearing.  He did have some vertigo when it first started.  He denies rhinorrhea, nasal congestion, facial pain, ear pain, sore throat.  They have tried allergy nasal spray for this issue without good response.  He went to his allergist this morning and they prescribed steroids.  He was told his ears were impacted, but they didn't have anything there to clean his ears out.     The following portions of the patient's history were reviewed and updated as appropriate: allergies, current medications, past family history, past medical history, past social history, past surgical history and problem list.     Allergies   Allergen Reactions   • Atorvastatin    • Penicillins    • Pitavastatin    • Pravastatin    • Rosuvastatin    • Simvastatin          Current Outpatient Prescriptions:   •  aspirin 81 MG tablet, Take  by mouth daily., Disp: , Rfl:   •  Cyanocobalamin 2500 MCG sublingual tablet, Daily, Disp: 90 each, Rfl: 3  •  Fluticasone Furoate-Vilanterol (BREO ELLIPTA) 100-25 MCG/INH aerosol powder , Inhale 1 puff Daily., Disp: , Rfl:   •  zafirlukast (ACCOLATE) 20 MG tablet, Take 1 tablet by mouth 2 (Two) Times a Day., Disp: 60 tablet, Rfl: 11    ROS    Review of Systems   Constitutional: Negative for chills, fatigue and fever.   HENT: Negative for congestion, postnasal drip and sore throat.         Ear fullness with hearing loss   Eyes: Positive for discharge. Negative for itching.   Respiratory: Negative for cough, shortness of breath and wheezing.    Cardiovascular: Negative for chest pain and leg swelling.   Gastrointestinal: Negative for abdominal pain, constipation, diarrhea, nausea and vomiting.   Musculoskeletal: Negative for arthralgias and back pain.  "  Allergic/Immunologic: Positive for environmental allergies.   Neurological: Negative for headache.       Vitals:    10/09/18 1339   BP: 106/82   BP Location: Left arm   Patient Position: Sitting   Cuff Size: Adult   Pulse: 88   Resp: 18   Temp: 98 °F (36.7 °C)   TempSrc: Temporal Artery    SpO2: 93%   Weight: 99.3 kg (219 lb)   Height: 180.3 cm (71\")     Body mass index is 30.54 kg/m².    Physical Exam     Physical Exam   Constitutional: He is oriented to person, place, and time. He appears well-developed and well-nourished. No distress.   HENT:   Head: Normocephalic and atraumatic.   Right Ear: External ear normal. cerumen impaction is present.  Left Ear: External ear normal. An impacted cerumen is present.  Mouth/Throat: Oropharynx is clear and moist.   Eyes: Conjunctivae and EOM are normal.   Cardiovascular: Normal rate and regular rhythm.    No murmur heard.  Pulmonary/Chest: Effort normal and breath sounds normal. No respiratory distress. He has no wheezes.   Abdominal: Soft. Bowel sounds are normal. He exhibits no distension. There is no tenderness.   Neurological: He is alert and oriented to person, place, and time. No cranial nerve deficit.   Skin: Skin is warm and dry.   Psychiatric: He has a normal mood and affect.       Assessment/Plan    Problem List Items Addressed This Visit     Bilateral impacted cerumen - Primary     Hearing loss and feeling of fullness likely secondary to cerumen impaction.  Attempted removal.  Significant amount was removed from the canal, but some cerumen remained occluded TM's.  Advised patient to try an antihistamine as well and do ear rinses at home over the next week a couple of times a day with peroxide and rubbing alcohol.  If no improvement he is to come back to the office.                No orders of the defined types were placed in this encounter.      No orders of the defined types were placed in this encounter.      Return if symptoms worsen or fail to " improve.    Ruchi Hernandez, DO   Other

## 2019-11-12 LAB
ALBUMIN SERPL-MCNC: 4.5 G/DL (ref 3.5–5.2)
ALBUMIN/GLOB SERPL: 1.6 G/DL
ALP SERPL-CCNC: 46 U/L (ref 39–117)
ALT SERPL-CCNC: 31 U/L (ref 1–41)
AST SERPL-CCNC: 22 U/L (ref 1–40)
BASOPHILS # BLD AUTO: 0.07 10*3/MM3 (ref 0–0.2)
BASOPHILS NFR BLD AUTO: 1.3 % (ref 0–1.5)
BILIRUB SERPL-MCNC: 0.6 MG/DL (ref 0.2–1.2)
BUN SERPL-MCNC: 13 MG/DL (ref 8–23)
BUN/CREAT SERPL: 12 (ref 7–25)
CALCIUM SERPL-MCNC: 9.5 MG/DL (ref 8.6–10.5)
CHLORIDE SERPL-SCNC: 102 MMOL/L (ref 98–107)
CHOLEST SERPL-MCNC: 259 MG/DL (ref 0–200)
CO2 SERPL-SCNC: 28.3 MMOL/L (ref 22–29)
CREAT SERPL-MCNC: 1.08 MG/DL (ref 0.76–1.27)
EOSINOPHIL # BLD AUTO: 0.19 10*3/MM3 (ref 0–0.4)
EOSINOPHIL NFR BLD AUTO: 3.6 % (ref 0.3–6.2)
ERYTHROCYTE [DISTWIDTH] IN BLOOD BY AUTOMATED COUNT: 13.3 % (ref 12.3–15.4)
GLOBULIN SER CALC-MCNC: 2.9 GM/DL
GLUCOSE SERPL-MCNC: 96 MG/DL (ref 65–99)
HCT VFR BLD AUTO: 48.3 % (ref 37.5–51)
HDLC SERPL-MCNC: 35 MG/DL (ref 40–60)
HGB BLD-MCNC: 15.9 G/DL (ref 13–17.7)
IMM GRANULOCYTES # BLD AUTO: 0.03 10*3/MM3 (ref 0–0.05)
IMM GRANULOCYTES NFR BLD AUTO: 0.6 % (ref 0–0.5)
LDLC SERPL CALC-MCNC: 189 MG/DL (ref 0–100)
LYMPHOCYTES # BLD AUTO: 1.7 10*3/MM3 (ref 0.7–3.1)
LYMPHOCYTES NFR BLD AUTO: 31.8 % (ref 19.6–45.3)
MCH RBC QN AUTO: 29.4 PG (ref 26.6–33)
MCHC RBC AUTO-ENTMCNC: 32.9 G/DL (ref 31.5–35.7)
MCV RBC AUTO: 89.3 FL (ref 79–97)
MONOCYTES # BLD AUTO: 0.54 10*3/MM3 (ref 0.1–0.9)
MONOCYTES NFR BLD AUTO: 10.1 % (ref 5–12)
NEUTROPHILS # BLD AUTO: 2.81 10*3/MM3 (ref 1.7–7)
NEUTROPHILS NFR BLD AUTO: 52.6 % (ref 42.7–76)
NRBC BLD AUTO-RTO: 0 /100 WBC (ref 0–0.2)
PLATELET # BLD AUTO: 288 10*3/MM3 (ref 140–450)
POTASSIUM SERPL-SCNC: 4.3 MMOL/L (ref 3.5–5.2)
PROT SERPL-MCNC: 7.4 G/DL (ref 6–8.5)
PSA SERPL-MCNC: 1.66 NG/ML (ref 0–4)
RBC # BLD AUTO: 5.41 10*6/MM3 (ref 4.14–5.8)
SODIUM SERPL-SCNC: 141 MMOL/L (ref 136–145)
T4 FREE SERPL-MCNC: 1.15 NG/DL (ref 0.93–1.7)
TRIGL SERPL-MCNC: 173 MG/DL (ref 0–150)
TSH SERPL DL<=0.005 MIU/L-ACNC: 0.28 UIU/ML (ref 0.27–4.2)
VIT B12 SERPL-MCNC: 1411 PG/ML (ref 211–946)
VLDLC SERPL CALC-MCNC: 34.6 MG/DL
WBC # BLD AUTO: 5.34 10*3/MM3 (ref 3.4–10.8)

## 2019-11-21 ENCOUNTER — TELEPHONE (OUTPATIENT)
Dept: INTERNAL MEDICINE | Facility: CLINIC | Age: 69
End: 2019-11-21

## 2019-11-21 ENCOUNTER — OFFICE VISIT (OUTPATIENT)
Dept: INTERNAL MEDICINE | Facility: CLINIC | Age: 69
End: 2019-11-21

## 2019-11-21 VITALS
WEIGHT: 222.8 LBS | OXYGEN SATURATION: 95 % | HEIGHT: 71 IN | SYSTOLIC BLOOD PRESSURE: 122 MMHG | TEMPERATURE: 97.8 F | RESPIRATION RATE: 16 BRPM | BODY MASS INDEX: 31.19 KG/M2 | DIASTOLIC BLOOD PRESSURE: 78 MMHG | HEART RATE: 98 BPM

## 2019-11-21 DIAGNOSIS — Z00.00 ROUTINE GENERAL MEDICAL EXAMINATION AT A HEALTH CARE FACILITY: Primary | ICD-10-CM

## 2019-11-21 DIAGNOSIS — E78.00 HYPERCHOLESTEREMIA: Primary | ICD-10-CM

## 2019-11-21 PROCEDURE — G0439 PPPS, SUBSEQ VISIT: HCPCS | Performed by: INTERNAL MEDICINE

## 2019-11-21 RX ORDER — DESLORATADINE 5 MG/1
5 TABLET ORAL DAILY PRN
COMMUNITY

## 2019-11-21 NOTE — PROGRESS NOTES
QUICK REFERENCE INFORMATION:  The ABCs of the Annual Wellness Visit    Medicare Annual Wellness Visit    Subjective   History of Present Illness    Mohsen Bucio is a 69 y.o. male who presents for an Annual Wellness Visit. In addition, we addressed the following health issues htn          PMH, PSH, SocHx, FamHx, Allergies, and Medications: Reviewed and updated.     Outpatient Medications Prior to Visit   Medication Sig Dispense Refill   • albuterol 108 (90 Base) MCG/ACT inhaler Inhale 2 puffs Every 4 (Four) Hours As Needed for Wheezing.     • Cyanocobalamin 2500 MCG sublingual tablet Daily 90 each 3   • desloratadine (CLARINEX) 5 MG tablet Take 5 mg by mouth Daily.     • Fluticasone Furoate-Vilanterol (BREO ELLIPTA) 100-25 MCG/INH aerosol powder  Inhale 1 puff Daily.     • zafirlukast (ACCOLATE) 20 MG tablet Take 1 tablet by mouth 2 (Two) Times a Day. 60 tablet 11   • aspirin 81 MG tablet Take  by mouth daily.       No facility-administered medications prior to visit.        Patient Active Problem List   Diagnosis   • Atopic rhinitis   • Asthma   • Chronic cough   • Hypercholesterolemia   • Hyperthyroidism   • Hypothyroidism   • Uninodular goiter   • Multiple thyroid nodules   • Cobalamin deficiency   • Bilateral impacted cerumen       Health Habits:  Dental Exam. up to date  Eye Exam. up to date  No exam data present  Exercise: 7 times/week.  Current exercise activities include: walking    Health Risk Assessment:  The patient has completed a Health Risk Assessment. This has been reviewed with them and has been scanned into the patient's chart.    Current Medical Providers:  Patient Care Team:  Johnathon Allan MD as PCP - General  Cho, Maria C Rachel MD as PCP - Claims Attributed    The [facility] providers who are involved in the care of this patient are listed above. Additional providers and suppliers are listed below:  None      Recent Hospitalizations:  No hospitalization(s) within the last  year..    Recent Lab Results:  CMP:  Lab Results   Component Value Date    GLU 96 11/12/2019    BUN 13 11/12/2019    CREATININE 1.08 11/12/2019    EGFRIFNONA 68 11/12/2019    EGFRIFAFRI 83 11/12/2019    BCR 12.0 11/12/2019     11/12/2019    K 4.3 11/12/2019    CO2 28.3 11/12/2019    CALCIUM 9.5 11/12/2019    PROTENTOTREF 7.4 11/12/2019    ALBUMIN 4.50 11/12/2019    LABGLOBREF 2.9 11/12/2019    LABIL2 1.6 11/12/2019    BILITOT 0.6 11/12/2019    ALKPHOS 46 11/12/2019    AST 22 11/12/2019    ALT 31 11/12/2019       LIPID PANEL:  Lab Results   Component Value Date    CHLPL 259 (H) 11/12/2019    TRIG 173 (H) 11/12/2019    HDL 35 (L) 11/12/2019    VLDL 34.6 11/12/2019     (H) 11/12/2019       HbA1c:  No results found for: HGBA1C    URINE MICROALBUMIN:  No results found for: MICROALBUR, POCMALB    PSA:  Lab Results   Component Value Date    PSA 1.660 11/12/2019       Age-appropriate Screening Schedule:  Refer to the list below for future screening recommendations based on patient's age, sex and/or medical conditions. Orders for these recommended tests are listed in the plan section. The patient has been provided with a written plan.    Health Maintenance   Topic Date Due   • ZOSTER VACCINE (1 of 2) 02/07/2002   • PNEUMOCOCCAL VACCINES (65+ LOW/MEDIUM RISK) (2 of 2 - PPSV23) 06/04/2019   • LIPID PANEL  11/12/2020   • TDAP/TD VACCINES (2 - Td) 12/07/2021   • COLONOSCOPY  09/07/2026   • INFLUENZA VACCINE  Completed       Depression Screen:   PHQ-2/PHQ-9 Depression Screening 11/21/2019   Little interest or pleasure in doing things 0   Feeling down, depressed, or hopeless 0   Trouble falling or staying asleep, or sleeping too much 0   Feeling tired or having little energy 0   Poor appetite or overeating 0   Feeling bad about yourself - or that you are a failure or have let yourself or your family down 0   Trouble concentrating on things, such as reading the newspaper or watching television 0   Moving or speaking so  slowly that other people could have noticed. Or the opposite - being so fidgety or restless that you have been moving around a lot more than usual -   Thoughts that you would be better off dead, or of hurting yourself in some way 0   Total Score 0         Functional and Cognitive Screening:  Functional & Cognitive Status 11/21/2019   Do you have difficulty preparing food and eating? No   Do you have difficulty bathing yourself, getting dressed or grooming yourself? No   Do you have difficulty using the toilet? No   Do you have difficulty moving around from place to place? No   Do you have trouble with steps or getting out of a bed or a chair? No   Current Diet Well Balanced Diet   Dental Exam Up to date   Eye Exam Up to date   Exercise (times per week) 7 times per week   Current Exercise Activities Include Walking   Do you need help using the phone?  No   Are you deaf or do you have serious difficulty hearing?  No   Do you need help with transportation? No   Do you need help shopping? No   Do you need help preparing meals?  -   Do you need help with housework?  No   Do you need help with laundry? No   Do you need help taking your medications? No   Do you need help managing money? No   Do you ever drive or ride in a car without wearing a seat belt? No   Have you felt unusual stress, anger or loneliness in the last month? No   Who do you live with? Spouse   If you need help, do you have trouble finding someone available to you? No   Have you been bothered in the last four weeks by sexual problems? No   Do you have difficulty concentrating, remembering or making decisions? No       Does the patient have evidence of cognitive impairment? No    Advanced Care Planning:  Patient does not have an advance directive - not interested in additional information    Identification of Risk Factors:  Risk factors include: Advance Directive Discussion.    Review of Systems   Constitutional: Negative for fatigue.  "  Psychiatric/Behavioral: Negative for self-injury and sleep disturbance. The patient is not nervous/anxious.    All other systems reviewed and are negative.      Compared to one year ago, the patient feels his physical health is better.  Compared to one year ago, the patient feels his mental health is better.    Objective     Physical Exam   Constitutional: He is oriented to person, place, and time. He appears well-developed and well-nourished.   HENT:   Head: Normocephalic and atraumatic.   Right Ear: External ear normal.   Left Ear: External ear normal.   Nose: Nose normal.   Mouth/Throat: Oropharynx is clear and moist.   Eyes: Conjunctivae and EOM are normal. Pupils are equal, round, and reactive to light.   Neck: Normal range of motion. Neck supple.   Abdominal: Soft.   Musculoskeletal: Normal range of motion.   Neurological: He is alert and oriented to person, place, and time.   Skin: Skin is warm and dry.   Psychiatric: He has a normal mood and affect. His behavior is normal. Thought content normal.       Vitals:    11/21/19 0808 11/21/19 0824   BP: 141/89 122/78   BP Location: Left arm    Patient Position: Sitting    Cuff Size: Large Adult    Pulse: 98    Resp: 16    Temp: 97.8 °F (36.6 °C)    TempSrc: Oral    SpO2: 95%    Weight: 101 kg (222 lb 12.8 oz)    Height: 180.3 cm (71\")        Body mass index is 31.07 kg/m².  Discussed the patient's BMI with him. The BMI is in the acceptable range.    Assessment/Plan   Patient Self-Management and Personalized Health Advice  The patient has been provided with information about: diet, exercise, weight management and fall prevention and preventive services including:   · Annual Wellness Visit (AWV).    Visit Diagnoses:    ICD-10-CM ICD-9-CM   1. Routine general medical examination at a health care facility Z00.00 V70.0       No orders of the defined types were placed in this encounter.      Outpatient Encounter Medications as of 11/21/2019   Medication Sig Dispense " Refill   • albuterol 108 (90 Base) MCG/ACT inhaler Inhale 2 puffs Every 4 (Four) Hours As Needed for Wheezing.     • Cyanocobalamin 2500 MCG sublingual tablet Daily 90 each 3   • desloratadine (CLARINEX) 5 MG tablet Take 5 mg by mouth Daily.     • Fluticasone Furoate-Vilanterol (BREO ELLIPTA) 100-25 MCG/INH aerosol powder  Inhale 1 puff Daily.     • zafirlukast (ACCOLATE) 20 MG tablet Take 1 tablet by mouth 2 (Two) Times a Day. 60 tablet 11   • [DISCONTINUED] aspirin 81 MG tablet Take  by mouth daily.       No facility-administered encounter medications on file as of 11/21/2019.        Reviewed use of high risk medication in the elderly: yes  Reviewed for potential of harmful drug interactions in the elderly: yes    Follow Up:  Return in about 1 year (around 11/21/2020).     An After Visit Summary and PPPS with all of these plans were given to the patient.      Labs look good.      Retake on bp 122/78  Chronic pain. 0/10.          Mohsen was seen today for annual exam.    Diagnoses and all orders for this visit:    Routine general medical examination at a health care facility

## 2019-12-04 ENCOUNTER — PRIOR AUTHORIZATION (OUTPATIENT)
Dept: INTERNAL MEDICINE | Facility: CLINIC | Age: 69
End: 2019-12-04

## 2019-12-04 NOTE — TELEPHONE ENCOUNTER
Prior Authorization on Praluent 150 MG/ML auto-injector has been approved.  Notified pharmacy and patient.

## 2020-02-10 LAB
CHOLEST SERPL-MCNC: 122 MG/DL (ref 0–200)
HDLC SERPL-MCNC: 39 MG/DL (ref 40–60)
LDLC SERPL CALC-MCNC: 47 MG/DL (ref 0–100)
TRIGL SERPL-MCNC: 180 MG/DL (ref 0–150)
VLDLC SERPL CALC-MCNC: 36 MG/DL

## 2020-02-20 ENCOUNTER — OFFICE VISIT (OUTPATIENT)
Dept: INTERNAL MEDICINE | Facility: CLINIC | Age: 70
End: 2020-02-20

## 2020-02-20 VITALS
SYSTOLIC BLOOD PRESSURE: 135 MMHG | TEMPERATURE: 98.2 F | WEIGHT: 226 LBS | HEIGHT: 71 IN | HEART RATE: 93 BPM | BODY MASS INDEX: 31.64 KG/M2 | DIASTOLIC BLOOD PRESSURE: 75 MMHG | RESPIRATION RATE: 16 BRPM | OXYGEN SATURATION: 98 %

## 2020-02-20 DIAGNOSIS — Z12.5 PROSTATE CANCER SCREENING: ICD-10-CM

## 2020-02-20 DIAGNOSIS — E78.00 HYPERCHOLESTEREMIA: ICD-10-CM

## 2020-02-20 DIAGNOSIS — K21.9 GASTROESOPHAGEAL REFLUX DISEASE WITHOUT ESOPHAGITIS: Primary | ICD-10-CM

## 2020-02-20 DIAGNOSIS — T46.6X5A STATIN MYOPATHY: ICD-10-CM

## 2020-02-20 DIAGNOSIS — G72.0 STATIN MYOPATHY: ICD-10-CM

## 2020-02-20 PROCEDURE — 99214 OFFICE O/P EST MOD 30 MIN: CPT | Performed by: INTERNAL MEDICINE

## 2020-02-20 RX ORDER — FAMOTIDINE 20 MG/1
20 TABLET, FILM COATED ORAL 2 TIMES DAILY
COMMUNITY
Start: 2020-01-15 | End: 2020-11-23

## 2020-02-20 RX ORDER — NICOTINE POLACRILEX 4 MG/1
20 GUM, CHEWING ORAL DAILY
Qty: 90 EACH | Refills: 3 | Status: SHIPPED | OUTPATIENT
Start: 2020-02-20 | End: 2020-11-23 | Stop reason: SDUPTHER

## 2020-02-20 NOTE — PROGRESS NOTES
Subjective  Answers for HPI/ROS submitted by the patient on 2/18/2020   What is the primary reason for your visit?: Other  Please describe your symptoms.: Acid reflux?  Frequent indigestion, including at night in bed.  Have you had these symptoms before?: Yes  How long have you been having these symptoms?: 1-4 weeks ago  Please list any medications you are currently taking for this condition.: Famotadine 20mg, prescribed by Dr Cho      Patient ID: Mohsen Bucio is a 70 y.o. male. Patient is here for management of multiple medical problems.     Chief Complaint   Patient presents with   • Heartburn     patient states he has been having acid indigestion, mainly at night, patient also states he has a chronic cough, patient prescribed Famotadine 20 mg and Desloratadine 5 mg by Dr. Cho x 2 month, patient would like to discuss these medications.     History of Present Illness     gerd for year or greater. C/o cough for quite a while. Seen allergy and gerd not a suspect for the cough. On pepcid.  Some better.  Cough worse at night. Increase salivation.    Now on Praulent.        The following portions of the patient's history were reviewed and updated as appropriate: allergies, current medications, past family history, past medical history, past social history, past surgical history and problem list.    Review of Systems   Constitutional: Negative for fatigue.   Respiratory: Positive for cough. Negative for shortness of breath and stridor.    Cardiovascular: Negative for leg swelling.   Gastrointestinal: Negative for abdominal distention, abdominal pain, anal bleeding, blood in stool and constipation.   Musculoskeletal: Negative for joint swelling and myalgias.   Psychiatric/Behavioral: Negative for self-injury and sleep disturbance. The patient is not hyperactive.    All other systems reviewed and are negative.      Current Outpatient Medications:   •  albuterol 108 (90 Base) MCG/ACT inhaler, Inhale 2 puffs Every  "4 (Four) Hours As Needed for Wheezing., Disp: , Rfl:   •  Alirocumab (PRALUENT) 150 MG/ML solution auto-injector, Inject 150 mg under the skin into the appropriate area as directed Every 14 (Fourteen) Days., Disp: 2 pen, Rfl: 11  •  Cyanocobalamin 2500 MCG sublingual tablet, Daily, Disp: 90 each, Rfl: 3  •  desloratadine (CLARINEX) 5 MG tablet, Take 5 mg by mouth Daily., Disp: , Rfl:   •  famotidine (PEPCID) 20 MG tablet, Take 20 mg by mouth 2 (Two) Times a Day., Disp: , Rfl:   •  Fluticasone Furoate-Vilanterol (BREO ELLIPTA) 100-25 MCG/INH aerosol powder , Inhale 1 puff Daily., Disp: , Rfl:   •  zafirlukast (ACCOLATE) 20 MG tablet, Take 1 tablet by mouth 2 (Two) Times a Day., Disp: 60 tablet, Rfl: 11  •  Omeprazole (EQ OMEPRAZOLE) 20 MG tablet delayed-release, Take 20 mg by mouth Daily., Disp: 90 each, Rfl: 3    Objective      Blood pressure 135/75, pulse 93, temperature 98.2 °F (36.8 °C), temperature source Oral, resp. rate 16, height 180.3 cm (71\"), weight 103 kg (226 lb), SpO2 98 %.    Physical Exam     General Appearance:    Alert, cooperative, no distress, appears stated age   Head:    Normocephalic, without obvious abnormality, atraumatic   Eyes:    PERRL, conjunctiva/corneas clear, EOM's intact   Ears:    Normal TM's and external ear canals, both ears   Nose:   Nares normal, septum midline, mucosa normal, no drainage   or sinus tenderness   Throat:   Lips, mucosa, and tongue normal; teeth and gums normal   Neck:   Supple, symmetrical, trachea midline, no adenopathy;        thyroid:  No enlargement/tenderness/nodules; no carotid    bruit or JVD   Back:     Symmetric, no curvature, ROM normal, no CVA tenderness   Lungs:     Clear to auscultation bilaterally, respirations unlabored   Chest wall:    No tenderness or deformity   Heart:    Regular rate and rhythm, S1 and S2 normal, no murmur,        rub or gallop   Abdomen:     Soft, non-tender, bowel sounds active all four quadrants,     no masses, no " organomegaly   Extremities:   Extremities normal, atraumatic, no cyanosis or edema   Pulses:   2+ and symmetric all extremities   Skin:   Skin color, texture, turgor normal, no rashes or lesions   Lymph nodes:   Cervical, supraclavicular, and axillary nodes normal   Neurologic:   CNII-XII intact. Normal strength, sensation and reflexes       throughout      Results for orders placed or performed in visit on 11/21/19   Lipid Panel   Result Value Ref Range    Total Cholesterol 122 0 - 200 mg/dL    Triglycerides 180 (H) 0 - 150 mg/dL    HDL Cholesterol 39 (L) 40 - 60 mg/dL    VLDL Cholesterol 36 mg/dL    LDL Cholesterol  47 0 - 100 mg/dL         Assessment/Plan     Pt has had great reduction in cholest. Diet and exercersis going well.  Pt is only approved to 5/26/2020.    No cp gianni Connolly was seen today for heartburn.    Diagnoses and all orders for this visit:    Gastroesophageal reflux disease without esophagitis  -     TSH  -     T4, Free  -     Comprehensive Metabolic Panel  -     Vitamin B12  -     CBC & Differential  -     Lipid Panel  -     PSA Screen    Hypercholesteremia  -     TSH  -     T4, Free  -     Comprehensive Metabolic Panel  -     Vitamin B12  -     CBC & Differential  -     Lipid Panel  -     PSA Screen    Statin myopathy  -     TSH  -     T4, Free  -     Comprehensive Metabolic Panel  -     Vitamin B12  -     CBC & Differential  -     Lipid Panel  -     PSA Screen    Prostate cancer screening  -     PSA Screen    Other orders  -     Omeprazole (EQ OMEPRAZOLE) 20 MG tablet delayed-release; Take 20 mg by mouth Daily.      Return nov.          There are no Patient Instructions on file for this visit.     Johnathon Allan MD    Assessment/Plan

## 2020-11-05 RX ORDER — ALIROCUMAB 150 MG/ML
INJECTION, SOLUTION SUBCUTANEOUS
Qty: 6 PEN | Refills: 3 | Status: SHIPPED | OUTPATIENT
Start: 2020-11-05 | End: 2021-12-01 | Stop reason: SDUPTHER

## 2020-11-12 LAB
ALBUMIN SERPL-MCNC: 4.5 G/DL (ref 3.5–5.2)
ALBUMIN/GLOB SERPL: 1.9 G/DL
ALP SERPL-CCNC: 46 U/L (ref 39–117)
ALT SERPL-CCNC: 31 U/L (ref 1–41)
AST SERPL-CCNC: 26 U/L (ref 1–40)
BASOPHILS # BLD AUTO: 0.06 10*3/MM3 (ref 0–0.2)
BASOPHILS NFR BLD AUTO: 1.2 % (ref 0–1.5)
BILIRUB SERPL-MCNC: 0.5 MG/DL (ref 0–1.2)
BUN SERPL-MCNC: 18 MG/DL (ref 8–23)
BUN/CREAT SERPL: 14.1 (ref 7–25)
CALCIUM SERPL-MCNC: 9.6 MG/DL (ref 8.6–10.5)
CHLORIDE SERPL-SCNC: 104 MMOL/L (ref 98–107)
CHOLEST SERPL-MCNC: 109 MG/DL (ref 0–200)
CO2 SERPL-SCNC: 27.1 MMOL/L (ref 22–29)
CREAT SERPL-MCNC: 1.28 MG/DL (ref 0.76–1.27)
EOSINOPHIL # BLD AUTO: 0.14 10*3/MM3 (ref 0–0.4)
EOSINOPHIL NFR BLD AUTO: 2.9 % (ref 0.3–6.2)
ERYTHROCYTE [DISTWIDTH] IN BLOOD BY AUTOMATED COUNT: 13.1 % (ref 12.3–15.4)
GLOBULIN SER CALC-MCNC: 2.4 GM/DL
GLUCOSE SERPL-MCNC: 104 MG/DL (ref 65–99)
HCT VFR BLD AUTO: 45 % (ref 37.5–51)
HDLC SERPL-MCNC: 40 MG/DL (ref 40–60)
HGB BLD-MCNC: 15.2 G/DL (ref 13–17.7)
IMM GRANULOCYTES # BLD AUTO: 0.01 10*3/MM3 (ref 0–0.05)
IMM GRANULOCYTES NFR BLD AUTO: 0.2 % (ref 0–0.5)
LDLC SERPL CALC-MCNC: 45 MG/DL (ref 0–100)
LYMPHOCYTES # BLD AUTO: 1.8 10*3/MM3 (ref 0.7–3.1)
LYMPHOCYTES NFR BLD AUTO: 36.7 % (ref 19.6–45.3)
MCH RBC QN AUTO: 29.9 PG (ref 26.6–33)
MCHC RBC AUTO-ENTMCNC: 33.8 G/DL (ref 31.5–35.7)
MCV RBC AUTO: 88.4 FL (ref 79–97)
MONOCYTES # BLD AUTO: 0.5 10*3/MM3 (ref 0.1–0.9)
MONOCYTES NFR BLD AUTO: 10.2 % (ref 5–12)
NEUTROPHILS # BLD AUTO: 2.4 10*3/MM3 (ref 1.7–7)
NEUTROPHILS NFR BLD AUTO: 48.8 % (ref 42.7–76)
NRBC BLD AUTO-RTO: 0 /100 WBC (ref 0–0.2)
PLATELET # BLD AUTO: 278 10*3/MM3 (ref 140–450)
POTASSIUM SERPL-SCNC: 4.5 MMOL/L (ref 3.5–5.2)
PROT SERPL-MCNC: 6.9 G/DL (ref 6–8.5)
PSA SERPL-MCNC: 1.5 NG/ML (ref 0–4)
RBC # BLD AUTO: 5.09 10*6/MM3 (ref 4.14–5.8)
SODIUM SERPL-SCNC: 140 MMOL/L (ref 136–145)
T4 FREE SERPL-MCNC: 1.15 NG/DL (ref 0.93–1.7)
TRIGL SERPL-MCNC: 135 MG/DL (ref 0–150)
TSH SERPL DL<=0.005 MIU/L-ACNC: 0.32 UIU/ML (ref 0.27–4.2)
VIT B12 SERPL-MCNC: 788 PG/ML (ref 211–946)
VLDLC SERPL CALC-MCNC: 24 MG/DL (ref 5–40)
WBC # BLD AUTO: 4.91 10*3/MM3 (ref 3.4–10.8)

## 2020-11-23 ENCOUNTER — OFFICE VISIT (OUTPATIENT)
Dept: INTERNAL MEDICINE | Facility: CLINIC | Age: 70
End: 2020-11-23

## 2020-11-23 VITALS
TEMPERATURE: 97.7 F | RESPIRATION RATE: 16 BRPM | HEIGHT: 71 IN | HEART RATE: 91 BPM | DIASTOLIC BLOOD PRESSURE: 80 MMHG | BODY MASS INDEX: 31.78 KG/M2 | OXYGEN SATURATION: 98 % | SYSTOLIC BLOOD PRESSURE: 118 MMHG | WEIGHT: 227 LBS

## 2020-11-23 DIAGNOSIS — Z00.00 ROUTINE GENERAL MEDICAL EXAMINATION AT A HEALTH CARE FACILITY: ICD-10-CM

## 2020-11-23 DIAGNOSIS — E78.00 HYPERCHOLESTEREMIA: ICD-10-CM

## 2020-11-23 DIAGNOSIS — Z23 NEED FOR PNEUMOCOCCAL VACCINATION: Primary | ICD-10-CM

## 2020-11-23 DIAGNOSIS — N28.9 ACUTE RENAL INSUFFICIENCY: ICD-10-CM

## 2020-11-23 PROCEDURE — 90732 PPSV23 VACC 2 YRS+ SUBQ/IM: CPT | Performed by: INTERNAL MEDICINE

## 2020-11-23 PROCEDURE — G0439 PPPS, SUBSEQ VISIT: HCPCS | Performed by: INTERNAL MEDICINE

## 2020-11-23 PROCEDURE — G0009 ADMIN PNEUMOCOCCAL VACCINE: HCPCS | Performed by: INTERNAL MEDICINE

## 2020-11-23 RX ORDER — NICOTINE POLACRILEX 4 MG/1
20 GUM, CHEWING ORAL DAILY
Qty: 90 EACH | Refills: 3 | Status: SHIPPED | OUTPATIENT
Start: 2020-11-23 | End: 2021-06-02

## 2020-11-23 RX ORDER — NICOTINE POLACRILEX 4 MG/1
20 GUM, CHEWING ORAL DAILY
Qty: 90 EACH | Refills: 3 | Status: CANCELLED | OUTPATIENT
Start: 2020-11-23

## 2020-11-23 NOTE — PROGRESS NOTES
QUICK REFERENCE INFORMATION:  The ABCs of the Annual Wellness Visit    Medicare Annual Wellness Visit    Subjective   History of Present Illness    Mohsen Bucio is a 70 y.o. male who presents for an Annual Wellness Visit. In addition, we addressed the following health issues:    Chronic pain. 0/10       PMH, PSH, SocHx, FamHx, Allergies, and Medications: Reviewed and updated.     Outpatient Medications Prior to Visit   Medication Sig Dispense Refill   • albuterol 108 (90 Base) MCG/ACT inhaler Inhale 2 puffs Every 4 (Four) Hours As Needed for Wheezing.     • Cyanocobalamin 2500 MCG sublingual tablet Daily 90 each 3   • desloratadine (CLARINEX) 5 MG tablet Take 5 mg by mouth Daily.     • Praluent 150 MG/ML solution auto-injector INJECT 150 MG PEN UNDER THE SKIN AS DIRECTED EVERY 14 DAYS 6 pen 3   • zafirlukast (ACCOLATE) 20 MG tablet Take 1 tablet by mouth 2 (Two) Times a Day. 60 tablet 11   • Fluticasone Furoate-Vilanterol (BREO ELLIPTA) 100-25 MCG/INH aerosol powder  Inhale 1 puff Daily.     • Omeprazole (EQ OMEPRAZOLE) 20 MG tablet delayed-release Take 20 mg by mouth Daily. 90 each 3   • famotidine (PEPCID) 20 MG tablet Take 20 mg by mouth 2 (Two) Times a Day.       No facility-administered medications prior to visit.        Patient Active Problem List   Diagnosis   • Atopic rhinitis   • Asthma   • Chronic cough   • Hypercholesterolemia   • Hyperthyroidism   • Hypothyroidism   • Uninodular goiter   • Multiple thyroid nodules   • Cobalamin deficiency   • Bilateral impacted cerumen       Health Habits:  Dental Exam. up to date  Eye Exam. up to date  No exam data present  Exercise: 7 times/week.  Current exercise activities include: walking    Health Risk Assessment:  The patient has completed a Health Risk Assessment. This has been reviewed with them and has been scanned into the patient's chart.    Current Medical Providers:  Patient Care Team:  Johnathon Allan MD as PCP - General    [facility]  providers who are involved in the care of this patient are listed above. Additional providers and suppliers are listed below:  Dr Cho      Recent Hospitalizations:  No hospitalization(s) within the last year..    Recent Lab Results:  CMP:  Lab Results   Component Value Date     (H) 11/11/2020    BUN 18 11/11/2020    CREATININE 1.28 (H) 11/11/2020    EGFRIFNONA 56 (L) 11/11/2020    EGFRIFAFRI 68 11/11/2020    BCR 14.1 11/11/2020     11/11/2020    K 4.5 11/11/2020    CO2 27.1 11/11/2020    CALCIUM 9.6 11/11/2020    PROTENTOTREF 6.9 11/11/2020    ALBUMIN 4.50 11/11/2020    LABGLOBREF 2.4 11/11/2020    LABIL2 1.9 11/11/2020    BILITOT 0.5 11/11/2020    ALKPHOS 46 11/11/2020    AST 26 11/11/2020    ALT 31 11/11/2020       LIPID PANEL:  Lab Results   Component Value Date    CHLPL 109 11/11/2020    TRIG 135 11/11/2020    HDL 40 11/11/2020    VLDL 24 11/11/2020    LDL 45 11/11/2020       HbA1c:  No results found for: HGBA1C    URINE MICROALBUMIN:  No results found for: MICROALBUR, POCMALB    PSA:  Lab Results   Component Value Date    PSA 1.500 11/11/2020       Age-appropriate Screening Schedule:  Refer to the list below for future screening recommendations based on patient's age, sex and/or medical conditions. Orders for these recommended tests are listed in the plan section. The patient has been provided with a written plan.    Health Maintenance   Topic Date Due   • ZOSTER VACCINE (1 of 2) 02/07/2002   • LIPID PANEL  11/11/2021   • TDAP/TD VACCINES (3 - Td) 12/07/2021   • COLONOSCOPY  09/07/2026   • INFLUENZA VACCINE  Completed       Depression Screen:   PHQ-2/PHQ-9 Depression Screening 11/23/2020   Little interest or pleasure in doing things 0   Feeling down, depressed, or hopeless 0   Trouble falling or staying asleep, or sleeping too much -   Feeling tired or having little energy -   Poor appetite or overeating -   Feeling bad about yourself - or that you are a failure or have let yourself or your family  down -   Trouble concentrating on things, such as reading the newspaper or watching television -   Moving or speaking so slowly that other people could have noticed. Or the opposite - being so fidgety or restless that you have been moving around a lot more than usual -   Thoughts that you would be better off dead, or of hurting yourself in some way -   Total Score 0         Functional and Cognitive Screening:  Functional & Cognitive Status 11/23/2020   Do you have difficulty preparing food and eating? No   Do you have difficulty bathing yourself, getting dressed or grooming yourself? No   Do you have difficulty using the toilet? No   Do you have difficulty moving around from place to place? No   Do you have trouble with steps or getting out of a bed or a chair? No   Current Diet Well Balanced Diet   Dental Exam Up to date   Eye Exam Up to date   Exercise (times per week) 5 times per week   Current Exercise Activities Include Walking   Do you need help using the phone?  No   Are you deaf or do you have serious difficulty hearing?  No   Do you need help with transportation? No   Do you need help shopping? No   Do you need help preparing meals?  No   Do you need help with housework?  No   Do you need help with laundry? No   Do you need help taking your medications? No   Do you need help managing money? No   Do you ever drive or ride in a car without wearing a seat belt? No   Have you felt unusual stress, anger or loneliness in the last month? No   Who do you live with? Spouse   If you need help, do you have trouble finding someone available to you? No   Have you been bothered in the last four weeks by sexual problems? No   Do you have difficulty concentrating, remembering or making decisions? No       Does the patient have evidence of cognitive impairment? No    Advanced Care Planning:  ACP discussion was held with the patient during this visit. Patient does not have an advance directive, declines further  assistance.    Identification of Risk Factors:  Risk factors include: Advance Directive Discussion  Fall Risk  Inactivity/Sedentary.    Review of Systems   Constitutional: Negative for fatigue.   Respiratory: Negative for cough and shortness of breath.    Psychiatric/Behavioral: Negative for self-injury and sleep disturbance. The patient is not nervous/anxious and is not hyperactive.    All other systems reviewed and are negative.      Compared to one year ago, the patient feels his physical health is better.  Compared to one year ago, the patient feels his mental health is better.    Objective     Physical Exam  Constitutional:       General: He is not in acute distress.     Appearance: He is obese. He is not ill-appearing.   HENT:      Head: Normocephalic and atraumatic.      Right Ear: Tympanic membrane normal.      Left Ear: Tympanic membrane normal.      Nose: Nose normal. No congestion or rhinorrhea.      Mouth/Throat:      Mouth: Mucous membranes are moist.      Pharynx: No oropharyngeal exudate or posterior oropharyngeal erythema.   Eyes:      General:         Left eye: No discharge.      Extraocular Movements: Extraocular movements intact.      Pupils: Pupils are equal, round, and reactive to light.   Neck:      Musculoskeletal: Normal range of motion and neck supple.   Cardiovascular:      Rate and Rhythm: Normal rate and regular rhythm.      Pulses: Normal pulses.      Heart sounds: Normal heart sounds. No murmur. No gallop.    Pulmonary:      Effort: Pulmonary effort is normal.      Breath sounds: Normal breath sounds.   Abdominal:      General: Abdomen is flat. Bowel sounds are normal.   Musculoskeletal: Normal range of motion.         General: No swelling, tenderness or deformity.   Skin:     General: Skin is warm and dry.      Capillary Refill: Capillary refill takes less than 2 seconds.      Coloration: Skin is not jaundiced or pale.      Findings: No bruising or erythema.   Neurological:       "General: No focal deficit present.      Mental Status: He is alert.      Cranial Nerves: No cranial nerve deficit.      Sensory: No sensory deficit.   Psychiatric:         Mood and Affect: Mood normal.         Behavior: Behavior normal.         Vitals:    11/23/20 0802   BP: 118/80   Pulse: 91   Resp: 16   Temp: 97.7 °F (36.5 °C)   SpO2: 98%   Weight: 103 kg (227 lb)   Height: 180.3 cm (71\")   PainSc: 0-No pain       Body mass index is 31.66 kg/m².  Discussed the patient's BMI with him. The BMI is in the acceptable range.    Assessment/Plan   Patient Self-Management and Personalized Health Advice  The patient has been provided with information about: diet, exercise and weight management and preventive services including:   · Annual Wellness Visit (AWV)  · Pneumococcal Vaccine and Administration.    Visit Diagnoses:    ICD-10-CM ICD-9-CM   1. Need for pneumococcal vaccination  Z23 V03.82   2. Routine general medical examination at a health care facility  Z00.00 V70.0   3. Hypercholesteremia  E78.00 272.0   4. Acute renal insufficiency  N28.9 593.9       Orders Placed This Encounter   Procedures   • Pneumococcal Polysaccharide Vaccine 23-Valent (PPSV23) Greater Than or Equal To 3yo Subcutaneous / IM   • Basic metabolic panel       Outpatient Encounter Medications as of 11/23/2020   Medication Sig Dispense Refill   • albuterol 108 (90 Base) MCG/ACT inhaler Inhale 2 puffs Every 4 (Four) Hours As Needed for Wheezing.     • Cyanocobalamin 2500 MCG sublingual tablet Daily 90 each 3   • desloratadine (CLARINEX) 5 MG tablet Take 5 mg by mouth Daily.     • Omeprazole (EQ Omeprazole) 20 MG tablet delayed-release Take 20 mg by mouth Daily. 90 each 3   • Praluent 150 MG/ML solution auto-injector INJECT 150 MG PEN UNDER THE SKIN AS DIRECTED EVERY 14 DAYS 6 pen 3   • zafirlukast (ACCOLATE) 20 MG tablet Take 1 tablet by mouth 2 (Two) Times a Day. 60 tablet 11   • [DISCONTINUED] Fluticasone Furoate-Vilanterol (BREO ELLIPTA) 100-25 " MCG/INH aerosol powder  Inhale 1 puff Daily.     • [DISCONTINUED] Omeprazole (EQ OMEPRAZOLE) 20 MG tablet delayed-release Take 20 mg by mouth Daily. 90 each 3   • [DISCONTINUED] famotidine (PEPCID) 20 MG tablet Take 20 mg by mouth 2 (Two) Times a Day.       No facility-administered encounter medications on file as of 11/23/2020.        Reviewed use of high risk medication in the elderly: yes  Reviewed for potential of harmful drug interactions in the elderly: yes    Follow Up:  Return in about 6 months (around 5/23/2021).     An After Visit Summary and PPPS with all of these plans were given to the patient.      Pt takes ppi qod.  Acute renal insuff. Slower stream to start.   Likely mask wearing and not drinking enough.    Other labs look good.             Diagnoses and all orders for this visit:    1. Need for pneumococcal vaccination (Primary)  -     Pneumococcal Polysaccharide Vaccine 23-Valent (PPSV23) Greater Than or Equal To 3yo Subcutaneous / IM    2. Routine general medical examination at a health care facility    3. Hypercholesteremia    4. Acute renal insufficiency  -     Basic metabolic panel    Other orders  -     Cancel: Omeprazole (EQ Omeprazole) 20 MG tablet delayed-release; Take 20 mg by mouth Daily.  Dispense: 90 each; Refill: 3  -     Omeprazole (EQ Omeprazole) 20 MG tablet delayed-release; Take 20 mg by mouth Daily.  Dispense: 90 each; Refill: 3

## 2020-12-01 LAB
BUN SERPL-MCNC: 12 MG/DL (ref 8–23)
BUN/CREAT SERPL: 9.2 (ref 7–25)
CALCIUM SERPL-MCNC: 9.8 MG/DL (ref 8.6–10.5)
CHLORIDE SERPL-SCNC: 102 MMOL/L (ref 98–107)
CO2 SERPL-SCNC: 29.5 MMOL/L (ref 22–29)
CREAT SERPL-MCNC: 1.31 MG/DL (ref 0.76–1.27)
GLUCOSE SERPL-MCNC: 135 MG/DL (ref 65–99)
POTASSIUM SERPL-SCNC: 5 MMOL/L (ref 3.5–5.2)
SODIUM SERPL-SCNC: 140 MMOL/L (ref 136–145)

## 2020-12-02 DIAGNOSIS — N28.9 ACUTE RENAL INSUFFICIENCY: Primary | ICD-10-CM

## 2020-12-10 ENCOUNTER — HOSPITAL ENCOUNTER (OUTPATIENT)
Dept: ULTRASOUND IMAGING | Facility: HOSPITAL | Age: 70
Discharge: HOME | End: 2020-12-10
Admitting: INTERNAL MEDICINE

## 2020-12-10 PROCEDURE — 76775 US EXAM ABDO BACK WALL LIM: CPT

## 2020-12-23 LAB
BUN SERPL-MCNC: 13 MG/DL (ref 8–23)
BUN/CREAT SERPL: 10.6 (ref 7–25)
CALCIUM SERPL-MCNC: 9.5 MG/DL (ref 8.6–10.5)
CHLORIDE SERPL-SCNC: 102 MMOL/L (ref 98–107)
CO2 SERPL-SCNC: 28.5 MMOL/L (ref 22–29)
CREAT SERPL-MCNC: 1.23 MG/DL (ref 0.76–1.27)
GLUCOSE SERPL-MCNC: 126 MG/DL (ref 65–99)
POTASSIUM SERPL-SCNC: 5 MMOL/L (ref 3.5–5.2)
SODIUM SERPL-SCNC: 138 MMOL/L (ref 136–145)

## 2020-12-30 ENCOUNTER — OFFICE VISIT (OUTPATIENT)
Dept: INTERNAL MEDICINE | Facility: CLINIC | Age: 70
End: 2020-12-30

## 2020-12-30 VITALS
BODY MASS INDEX: 31.92 KG/M2 | OXYGEN SATURATION: 99 % | HEIGHT: 71 IN | WEIGHT: 228 LBS | SYSTOLIC BLOOD PRESSURE: 126 MMHG | TEMPERATURE: 97.3 F | DIASTOLIC BLOOD PRESSURE: 82 MMHG | HEART RATE: 83 BPM | RESPIRATION RATE: 16 BRPM

## 2020-12-30 DIAGNOSIS — K21.9 GERD WITHOUT ESOPHAGITIS: ICD-10-CM

## 2020-12-30 DIAGNOSIS — N17.9 AKI (ACUTE KIDNEY INJURY) (HCC): Primary | ICD-10-CM

## 2020-12-30 PROCEDURE — 99213 OFFICE O/P EST LOW 20 MIN: CPT | Performed by: INTERNAL MEDICINE

## 2020-12-30 NOTE — PROGRESS NOTES
"Subjective     Patient ID: Mohsen Bucio is a 70 y.o. male. Patient is here for management of multiple medical problems.     Chief Complaint   Patient presents with   • Hyperlipidemia     History of Present Illness     hyperlip      Dehydration. Worse on hydration.    Cr trending back down.          The following portions of the patient's history were reviewed and updated as appropriate: allergies, current medications, past family history, past medical history, past social history, past surgical history and problem list.    Review of Systems   Constitutional: Negative for fatigue.   HENT: Negative for mouth sores and nosebleeds.    Respiratory: Negative for shortness of breath and stridor.    Musculoskeletal: Negative for back pain and gait problem.   Psychiatric/Behavioral: Negative for self-injury and sleep disturbance. The patient is not nervous/anxious.    All other systems reviewed and are negative.      Current Outpatient Medications:   •  albuterol 108 (90 Base) MCG/ACT inhaler, Inhale 2 puffs Every 4 (Four) Hours As Needed for Wheezing., Disp: , Rfl:   •  Cyanocobalamin 2500 MCG sublingual tablet, Daily, Disp: 90 each, Rfl: 3  •  desloratadine (CLARINEX) 5 MG tablet, Take 5 mg by mouth Daily., Disp: , Rfl:   •  Omeprazole (EQ Omeprazole) 20 MG tablet delayed-release, Take 20 mg by mouth Daily., Disp: 90 each, Rfl: 3  •  Praluent 150 MG/ML solution auto-injector, INJECT 150 MG PEN UNDER THE SKIN AS DIRECTED EVERY 14 DAYS, Disp: 6 pen, Rfl: 3  •  zafirlukast (ACCOLATE) 20 MG tablet, Take 1 tablet by mouth 2 (Two) Times a Day., Disp: 60 tablet, Rfl: 11  •  Breo Ellipta 100-25 MCG/INH inhaler, , Disp: , Rfl:     Objective      Blood pressure 126/82, pulse 83, temperature 97.3 °F (36.3 °C), resp. rate 16, height 180.3 cm (71\"), weight 103 kg (228 lb), SpO2 99 %.    Physical Exam     General Appearance:    Alert, cooperative, no distress, appears stated age   Head:    Normocephalic, without obvious " abnormality, atraumatic   Eyes:    PERRL, conjunctiva/corneas clear, EOM's intact   Ears:    Normal TM's and external ear canals, both ears   Nose:   Nares normal, septum midline, mucosa normal, no drainage   or sinus tenderness   Throat:   Lips, mucosa, and tongue normal; teeth and gums normal   Neck:   Supple, symmetrical, trachea midline, no adenopathy;        thyroid:  No enlargement/tenderness/nodules; no carotid    bruit or JVD   Back:     Symmetric, no curvature, ROM normal, no CVA tenderness   Lungs:     Clear to auscultation bilaterally, respirations unlabored   Chest wall:    No tenderness or deformity   Heart:    Regular rate and rhythm, S1 and S2 normal, no murmur,        rub or gallop   Abdomen:     Soft, non-tender, bowel sounds active all four quadrants,     no masses, no organomegaly   Extremities:   Extremities normal, atraumatic, no cyanosis or edema   Pulses:   2+ and symmetric all extremities   Skin:   Skin color, texture, turgor normal, no rashes or lesions   Lymph nodes:   Cervical, supraclavicular, and axillary nodes normal   Neurologic:   CNII-XII intact. Normal strength, sensation and reflexes       throughout      Results for orders placed or performed in visit on 12/02/20   Basic metabolic panel    Specimen: Blood   Result Value Ref Range    Glucose 126 (H) 65 - 99 mg/dL    BUN 13 8 - 23 mg/dL    Creatinine 1.23 0.76 - 1.27 mg/dL    eGFR Non African Am 59 (L) >60 mL/min/1.73    eGFR African Am 71 >60 mL/min/1.73    BUN/Creatinine Ratio 10.6 7.0 - 25.0    Sodium 138 136 - 145 mmol/L    Potassium 5.0 3.5 - 5.2 mmol/L    Chloride 102 98 - 107 mmol/L    Total CO2 28.5 22.0 - 29.0 mmol/L    Calcium 9.5 8.6 - 10.5 mg/dL         Assessment/Plan       SIN resolving with good hydration.      No NSAIDS.     Renal us good.     Pt has decrease ppi to qod. Will try q3 days.    Diagnoses and all orders for this visit:    1. SIN (acute kidney injury) (CMS/HCC) (Primary)  -     Basic metabolic panel    2.  GERD without esophagitis      Return in about 6 months (around 6/30/2021).          There are no Patient Instructions on file for this visit.     Johnathon Allan MD    Assessment/Plan

## 2021-03-10 ENCOUNTER — IMMUNIZATION (OUTPATIENT)
Dept: VACCINE CLINIC | Facility: HOSPITAL | Age: 71
End: 2021-03-10

## 2021-03-10 PROCEDURE — 91300 HC SARSCOV02 VAC 30MCG/0.3ML IM: CPT | Performed by: INTERNAL MEDICINE

## 2021-03-10 PROCEDURE — 0001A: CPT | Performed by: INTERNAL MEDICINE

## 2021-03-31 ENCOUNTER — IMMUNIZATION (OUTPATIENT)
Dept: VACCINE CLINIC | Facility: HOSPITAL | Age: 71
End: 2021-03-31

## 2021-03-31 PROCEDURE — 91300 HC SARSCOV02 VAC 30MCG/0.3ML IM: CPT | Performed by: INTERNAL MEDICINE

## 2021-03-31 PROCEDURE — 0002A: CPT | Performed by: INTERNAL MEDICINE

## 2021-05-26 LAB
BUN SERPL-MCNC: 14 MG/DL (ref 8–23)
BUN/CREAT SERPL: 10.2 (ref 7–25)
CALCIUM SERPL-MCNC: 10 MG/DL (ref 8.6–10.5)
CHLORIDE SERPL-SCNC: 104 MMOL/L (ref 98–107)
CO2 SERPL-SCNC: 27.7 MMOL/L (ref 22–29)
CREAT SERPL-MCNC: 1.37 MG/DL (ref 0.76–1.27)
GLUCOSE SERPL-MCNC: 101 MG/DL (ref 65–99)
POTASSIUM SERPL-SCNC: 5.2 MMOL/L (ref 3.5–5.2)
SODIUM SERPL-SCNC: 140 MMOL/L (ref 136–145)

## 2021-06-02 ENCOUNTER — OFFICE VISIT (OUTPATIENT)
Dept: INTERNAL MEDICINE | Facility: CLINIC | Age: 71
End: 2021-06-02

## 2021-06-02 VITALS
SYSTOLIC BLOOD PRESSURE: 110 MMHG | TEMPERATURE: 98 F | WEIGHT: 221 LBS | OXYGEN SATURATION: 95 % | DIASTOLIC BLOOD PRESSURE: 70 MMHG | BODY MASS INDEX: 30.94 KG/M2 | RESPIRATION RATE: 16 BRPM | HEIGHT: 71 IN | HEART RATE: 91 BPM

## 2021-06-02 DIAGNOSIS — R22.1 NECK FULLNESS: ICD-10-CM

## 2021-06-02 DIAGNOSIS — G47.33 OSA (OBSTRUCTIVE SLEEP APNEA): ICD-10-CM

## 2021-06-02 DIAGNOSIS — Q24.9 CARDIAC ABNORMALITY: ICD-10-CM

## 2021-06-02 DIAGNOSIS — N18.2 CHRONIC RENAL IMPAIRMENT, STAGE 2 (MILD): Primary | ICD-10-CM

## 2021-06-02 PROCEDURE — 99214 OFFICE O/P EST MOD 30 MIN: CPT | Performed by: INTERNAL MEDICINE

## 2021-06-02 NOTE — PROGRESS NOTES
"Subjective     Patient ID: Mohsen Bucio is a 71 y.o. male. Patient is here for management of multiple medical problems.     Chief Complaint   Patient presents with   • Heartburn     History of Present Illness       Heartburn/. On omeprole. Off now due to renal failure. gerd stable        The following portions of the patient's history were reviewed and updated as appropriate: allergies, current medications, past family history, past medical history, past social history, past surgical history and problem list.    Review of Systems   Constitutional: Negative for fatigue.   Psychiatric/Behavioral: Negative for self-injury and sleep disturbance. The patient is not nervous/anxious and is not hyperactive.    All other systems reviewed and are negative.      Current Outpatient Medications:   •  albuterol 108 (90 Base) MCG/ACT inhaler, Inhale 2 puffs Every 4 (Four) Hours As Needed for Wheezing., Disp: , Rfl:   •  Breo Ellipta 100-25 MCG/INH inhaler, , Disp: , Rfl:   •  Cyanocobalamin 2500 MCG sublingual tablet, Daily, Disp: 90 each, Rfl: 3  •  desloratadine (CLARINEX) 5 MG tablet, Take 5 mg by mouth Daily., Disp: , Rfl:   •  Praluent 150 MG/ML solution auto-injector, INJECT 150 MG PEN UNDER THE SKIN AS DIRECTED EVERY 14 DAYS, Disp: 6 pen, Rfl: 3  •  zafirlukast (ACCOLATE) 20 MG tablet, Take 1 tablet by mouth 2 (Two) Times a Day., Disp: 60 tablet, Rfl: 11    Objective      Blood pressure 110/70, pulse 91, temperature 98 °F (36.7 °C), resp. rate 16, height 180.3 cm (71\"), weight 100 kg (221 lb), SpO2 95 %.    Physical Exam     General Appearance:    Alert, cooperative, no distress, appears stated age   Head:    Normocephalic, without obvious abnormality, atraumatic   Eyes:    PERRL, conjunctiva/corneas clear, EOM's intact   Ears:    Normal TM's and external ear canals, both ears   Nose:   Nares normal, septum midline, mucosa normal, no drainage   or sinus tenderness   Throat:   Lips, mucosa, and tongue normal; teeth and " gums normal   Neck:   Supple, symmetrical, trachea midline, no adenopathy;        thyroid:  No enlargement/tenderness/nodules; no carotid    bruit or JVD   Back:     Symmetric, no curvature, ROM normal, no CVA tenderness   Lungs:     Clear to auscultation bilaterally, respirations unlabored   Chest wall:    No tenderness or deformity   Heart:    Regular rate and rhythm, S1 and S2 normal, no murmur,        rub or gallop   Abdomen:     Soft, non-tender, bowel sounds active all four quadrants,     no masses, no organomegaly   Extremities:   Extremities normal, atraumatic, no cyanosis or edema   Pulses:   2+ and symmetric all extremities   Skin:   Skin color, texture, turgor normal, no rashes or lesions   Lymph nodes:   Cervical, supraclavicular, and axillary nodes normal   Neurologic:   CNII-XII intact. Normal strength, sensation and reflexes       throughout      Results for orders placed or performed in visit on 12/30/20   Basic metabolic panel    Specimen: Blood   Result Value Ref Range    Glucose 101 (H) 65 - 99 mg/dL    BUN 14 8 - 23 mg/dL    Creatinine 1.37 (H) 0.76 - 1.27 mg/dL    eGFR Non African Am 52 (L) >60 mL/min/1.73    eGFR African Am 62 >60 mL/min/1.73    BUN/Creatinine Ratio 10.2 7.0 - 25.0    Sodium 140 136 - 145 mmol/L    Potassium 5.2 3.5 - 5.2 mmol/L    Chloride 104 98 - 107 mmol/L    Total CO2 27.7 22.0 - 29.0 mmol/L    Calcium 10.0 8.6 - 10.5 mg/dL         Assessment/Plan       Soda daily one small can. Will stop.    Cr up a bit.    No slow stream on urination.        Diagnoses and all orders for this visit:    1. Chronic renal impairment, stage 2 (mild) (Primary)  -     Basic metabolic panel  -     Nuclear Antigen Antibody, IFA  -     Glomerular Basement Membrane Antibodies  -     ANCA Panel  -     CK  -     Myoglobin, Serum  -     US Thyroid; Future  -     Urinalysis With Microscopic - Urine, Clean Catch  -     Ambulatory Referral to Neurology    2. ANN (obstructive sleep apnea)  -     Basic  metabolic panel  -     Nuclear Antigen Antibody, IFA  -     Glomerular Basement Membrane Antibodies  -     ANCA Panel  -     CK  -     Myoglobin, Serum  -     US Thyroid; Future  -     Urinalysis With Microscopic - Urine, Clean Catch  -     Ambulatory Referral to Neurology    3. Neck fullness  -     Basic metabolic panel  -     Nuclear Antigen Antibody, IFA  -     Glomerular Basement Membrane Antibodies  -     ANCA Panel  -     CK  -     Myoglobin, Serum  -     US Thyroid; Future  -     Ambulatory Referral to Cardiology  -     Urinalysis With Microscopic - Urine, Clean Catch  -     Ambulatory Referral to Neurology    4. Cardiac abnormality  -     Ambulatory Referral to Cardiology      Return in about 4 weeks (around 6/30/2021).          There are no Patient Instructions on file for this visit.     Johnathon Allan MD    Assessment/Plan

## 2021-06-10 ENCOUNTER — HOSPITAL ENCOUNTER (OUTPATIENT)
Dept: ULTRASOUND IMAGING | Facility: HOSPITAL | Age: 71
Discharge: HOME | End: 2021-06-10
Admitting: INTERNAL MEDICINE

## 2021-06-10 DIAGNOSIS — G47.33 OSA (OBSTRUCTIVE SLEEP APNEA): ICD-10-CM

## 2021-06-10 DIAGNOSIS — N18.2 CHRONIC RENAL IMPAIRMENT, STAGE 2 (MILD): ICD-10-CM

## 2021-06-10 DIAGNOSIS — R22.1 NECK FULLNESS: ICD-10-CM

## 2021-06-10 LAB
ANA TITR SER IF: NEGATIVE {TITER}
APPEARANCE UR: CLEAR
BACTERIA #/AREA URNS HPF: NORMAL /HPF
BILIRUB UR QL STRIP: NEGATIVE
BUN SERPL-MCNC: 12 MG/DL (ref 8–23)
BUN/CREAT SERPL: 9.5 (ref 7–25)
C-ANCA TITR SER IF: NORMAL TITER
CALCIUM SERPL-MCNC: 10.5 MG/DL (ref 8.6–10.5)
CASTS URNS MICRO: NORMAL
CHLORIDE SERPL-SCNC: 101 MMOL/L (ref 98–107)
CK SERPL-CCNC: 102 U/L (ref 20–200)
CO2 SERPL-SCNC: 31.1 MMOL/L (ref 22–29)
COLOR UR: YELLOW
CREAT SERPL-MCNC: 1.26 MG/DL (ref 0.76–1.27)
EPI CELLS #/AREA URNS HPF: NORMAL /HPF
GBM IGG SER-ACNC: 9 UNITS (ref 0–20)
GLUCOSE SERPL-MCNC: 106 MG/DL (ref 65–99)
GLUCOSE UR QL: NEGATIVE
HGB UR QL STRIP: NEGATIVE
KETONES UR QL STRIP: NEGATIVE
LEUKOCYTE ESTERASE UR QL STRIP: NEGATIVE
MYELOPEROXIDASE AB SER IA-ACNC: <9 U/ML (ref 0–9)
MYOGLOBIN SERPL-MCNC: 51.6 NG/ML (ref 28–72)
NITRITE UR QL STRIP: NEGATIVE
P-ANCA ATYPICAL TITR SER IF: NORMAL TITER
P-ANCA TITR SER IF: NORMAL TITER
PH UR STRIP: 7 [PH] (ref 5–8)
POTASSIUM SERPL-SCNC: 5 MMOL/L (ref 3.5–5.2)
PROT UR QL STRIP: NEGATIVE
PROTEINASE3 AB SER IA-ACNC: <3.5 U/ML (ref 0–3.5)
RBC #/AREA URNS HPF: NORMAL /HPF
SODIUM SERPL-SCNC: 140 MMOL/L (ref 136–145)
SP GR UR: 1.01 (ref 1–1.03)
UROBILINOGEN UR STRIP-MCNC: NORMAL MG/DL
WBC #/AREA URNS HPF: NORMAL /HPF

## 2021-06-10 PROCEDURE — 76536 US EXAM OF HEAD AND NECK: CPT

## 2021-07-02 ENCOUNTER — OFFICE VISIT (OUTPATIENT)
Dept: INTERNAL MEDICINE | Facility: CLINIC | Age: 71
End: 2021-07-02

## 2021-07-02 VITALS
SYSTOLIC BLOOD PRESSURE: 136 MMHG | WEIGHT: 216 LBS | RESPIRATION RATE: 16 BRPM | HEART RATE: 92 BPM | HEIGHT: 71 IN | BODY MASS INDEX: 30.24 KG/M2 | OXYGEN SATURATION: 96 % | DIASTOLIC BLOOD PRESSURE: 80 MMHG | TEMPERATURE: 97 F

## 2021-07-02 DIAGNOSIS — N28.9 ACUTE RENAL INSUFFICIENCY: Primary | ICD-10-CM

## 2021-07-02 DIAGNOSIS — I10 ESSENTIAL HYPERTENSION: ICD-10-CM

## 2021-07-02 PROCEDURE — 99213 OFFICE O/P EST LOW 20 MIN: CPT | Performed by: INTERNAL MEDICINE

## 2021-07-02 RX ORDER — CARVEDILOL 3.12 MG/1
3.12 TABLET ORAL 2 TIMES DAILY WITH MEALS
Qty: 60 TABLET | Refills: 11 | Status: SHIPPED | OUTPATIENT
Start: 2021-07-02 | End: 2021-08-16

## 2021-07-02 NOTE — PROGRESS NOTES
"Subjective     Patient ID: Mohsen Bucio is a 71 y.o. male. Patient is here for management of multiple medical problems.     Chief Complaint   Patient presents with   • Follow-up     chronic renal impairment      History of Present Illness     The following portions of the patient's history were reviewed and updated as appropriate: allergies, current medications, past family history, past medical history, past social history, past surgical history and problem list.    Review of Systems   Constitutional: Negative for diaphoresis and fatigue.   Musculoskeletal: Negative for joint swelling and myalgias.   Psychiatric/Behavioral: Negative for self-injury and sleep disturbance. The patient is not nervous/anxious.    All other systems reviewed and are negative.      Current Outpatient Medications:   •  albuterol 108 (90 Base) MCG/ACT inhaler, Inhale 2 puffs Every 4 (Four) Hours As Needed for Wheezing., Disp: , Rfl:   •  Breo Ellipta 100-25 MCG/INH inhaler, , Disp: , Rfl:   •  Cyanocobalamin 2500 MCG sublingual tablet, Daily, Disp: 90 each, Rfl: 3  •  desloratadine (CLARINEX) 5 MG tablet, Take 5 mg by mouth Daily., Disp: , Rfl:   •  Praluent 150 MG/ML solution auto-injector, INJECT 150 MG PEN UNDER THE SKIN AS DIRECTED EVERY 14 DAYS, Disp: 6 pen, Rfl: 3  •  zafirlukast (ACCOLATE) 20 MG tablet, Take 1 tablet by mouth 2 (Two) Times a Day., Disp: 60 tablet, Rfl: 11  •  carvedilol (Coreg) 3.125 MG tablet, Take 1 tablet by mouth 2 (Two) Times a Day With Meals., Disp: 60 tablet, Rfl: 11    Objective      Blood pressure 136/80, pulse 92, temperature 97 °F (36.1 °C), temperature source Temporal, resp. rate 16, height 180.3 cm (71\"), weight 98 kg (216 lb), SpO2 96 %.    Physical Exam     General Appearance:    Alert, cooperative, no distress, appears stated age   Head:    Normocephalic, without obvious abnormality, atraumatic   Eyes:    PERRL, conjunctiva/corneas clear, EOM's intact   Ears:    Normal TM's and external ear canals, " both ears   Nose:   Nares normal, septum midline, mucosa normal, no drainage   or sinus tenderness   Throat:   Lips, mucosa, and tongue normal; teeth and gums normal   Neck:   Supple, symmetrical, trachea midline, no adenopathy;        thyroid:  No enlargement/tenderness/nodules; no carotid    bruit or JVD   Back:     Symmetric, no curvature, ROM normal, no CVA tenderness   Lungs:     Clear to auscultation bilaterally, respirations unlabored   Chest wall:    No tenderness or deformity   Heart:    Regular rate and rhythm, S1 and S2 normal, no murmur,        rub or gallop   Abdomen:     Soft, non-tender, bowel sounds active all four quadrants,     no masses, no organomegaly   Extremities:   Extremities normal, atraumatic, no cyanosis or edema   Pulses:   2+ and symmetric all extremities   Skin:   Skin color, texture, turgor normal, no rashes or lesions   Lymph nodes:   Cervical, supraclavicular, and axillary nodes normal   Neurologic:   CNII-XII intact. Normal strength, sensation and reflexes       throughout      Results for orders placed or performed in visit on 06/02/21   Basic metabolic panel    Specimen: Blood   Result Value Ref Range    Glucose 106 (H) 65 - 99 mg/dL    BUN 12 8 - 23 mg/dL    Creatinine 1.26 0.76 - 1.27 mg/dL    eGFR Non African Am 57 (L) >60 mL/min/1.73    eGFR African Am 69 >60 mL/min/1.73    BUN/Creatinine Ratio 9.5 7.0 - 25.0    Sodium 140 136 - 145 mmol/L    Potassium 5.0 3.5 - 5.2 mmol/L    Chloride 101 98 - 107 mmol/L    Total CO2 31.1 (H) 22.0 - 29.0 mmol/L    Calcium 10.5 8.6 - 10.5 mg/dL   Nuclear Antigen Antibody, IFA    Specimen: Blood   Result Value Ref Range    BEATA Negative    Glomerular Basement Membrane Antibodies    Specimen: Blood   Result Value Ref Range    GBM Ab 9 0 - 20 units   ANCA Panel    Specimen: Blood   Result Value Ref Range    Myeloperoxidase Ab <9.0 0.0 - 9.0 U/mL    Antiproteinase 3 (GA-3) <3.5 0.0 - 3.5 U/mL    C-ANCA <1:20 Neg:<1:20 titer    P-ANCA <1:20  Neg:<1:20 titer    Atypical pANCA <1:20 Neg:<1:20 titer   CK    Specimen: Blood   Result Value Ref Range    Creatine Kinase 102 20 - 200 U/L   Myoglobin, Serum    Specimen: Blood   Result Value Ref Range    Myoglobin 51.6 28.0 - 72.0 ng/mL   Microscopic Examination -   Result Value Ref Range    WBC, UA 0-2 /HPF    RBC, UA 0-2 /HPF    Epithelial Cells (non renal) Comment /HPF    Cast Type Comment     Bacteria, UA Comment None Seen /HPF   Urinalysis With Microscopic - Urine, Clean Catch    Specimen: Urine, Clean Catch   Result Value Ref Range    Specific Gravity, UA 1.006 1.005 - 1.030    pH, UA 7.0 5.0 - 8.0    Color, UA Yellow     Appearance, UA Clear Clear    Leukocytes, UA Negative Negative    Protein Negative Negative    Glucose, UA Negative Negative    Ketones Negative Negative    Blood, UA Negative Negative    Bilirubin, UA Negative Negative    Urobilinogen, UA Comment     Nitrite, UA Negative Negative         Assessment/Plan       Diagnoses and all orders for this visit:    1. Acute renal insufficiency (Primary)    2. Essential hypertension    Other orders  -     carvedilol (Coreg) 3.125 MG tablet; Take 1 tablet by mouth 2 (Two) Times a Day With Meals.  Dispense: 60 tablet; Refill: 11    resolved howie to baseline.    Cardiologist.  Aug 31.      Return in about 4 weeks (around 7/30/2021).          There are no Patient Instructions on file for this visit.     Johnathon Allan MD    Assessment/Plan

## 2021-07-30 ENCOUNTER — OFFICE VISIT (OUTPATIENT)
Dept: INTERNAL MEDICINE | Facility: CLINIC | Age: 71
End: 2021-07-30

## 2021-07-30 VITALS
BODY MASS INDEX: 30.66 KG/M2 | DIASTOLIC BLOOD PRESSURE: 92 MMHG | RESPIRATION RATE: 16 BRPM | SYSTOLIC BLOOD PRESSURE: 145 MMHG | HEART RATE: 86 BPM | HEIGHT: 71 IN | OXYGEN SATURATION: 96 % | TEMPERATURE: 97.5 F | WEIGHT: 219 LBS

## 2021-07-30 DIAGNOSIS — I10 ESSENTIAL HYPERTENSION: ICD-10-CM

## 2021-07-30 DIAGNOSIS — I49.9 CARDIAC ARRHYTHMIA, UNSPECIFIED CARDIAC ARRHYTHMIA TYPE: Primary | ICD-10-CM

## 2021-07-30 PROCEDURE — 99214 OFFICE O/P EST MOD 30 MIN: CPT | Performed by: INTERNAL MEDICINE

## 2021-07-30 RX ORDER — LISINOPRIL 10 MG/1
10 TABLET ORAL DAILY
Qty: 90 TABLET | Refills: 3 | Status: SHIPPED | OUTPATIENT
Start: 2021-07-30 | End: 2022-03-03

## 2021-07-30 NOTE — PROGRESS NOTES
"Subjective     Patient ID: Mohsen Bucio is a 71 y.o. male. Patient is here for management of multiple medical problems.     Chief Complaint   Patient presents with   • Follow-up     History of Present Illness   htn f/u.  bp still mild elevation at home.      A bit more wheezing. On coreg.         The following portions of the patient's history were reviewed and updated as appropriate: allergies, current medications, past family history, past medical history, past social history, past surgical history and problem list.    Review of Systems   Constitutional: Negative for diaphoresis and fatigue.   Cardiovascular: Negative for chest pain and leg swelling.   Gastrointestinal: Negative for abdominal distention and abdominal pain.   Psychiatric/Behavioral: Negative for self-injury and sleep disturbance. The patient is not nervous/anxious.    All other systems reviewed and are negative.      Current Outpatient Medications:   •  albuterol 108 (90 Base) MCG/ACT inhaler, Inhale 2 puffs Every 4 (Four) Hours As Needed for Wheezing., Disp: , Rfl:   •  Breo Ellipta 100-25 MCG/INH inhaler, , Disp: , Rfl:   •  carvedilol (Coreg) 3.125 MG tablet, Take 1 tablet by mouth 2 (Two) Times a Day With Meals., Disp: 60 tablet, Rfl: 11  •  Cyanocobalamin 2500 MCG sublingual tablet, Daily, Disp: 90 each, Rfl: 3  •  desloratadine (CLARINEX) 5 MG tablet, Take 5 mg by mouth Daily., Disp: , Rfl:   •  Praluent 150 MG/ML solution auto-injector, INJECT 150 MG PEN UNDER THE SKIN AS DIRECTED EVERY 14 DAYS, Disp: 6 pen, Rfl: 3  •  zafirlukast (ACCOLATE) 20 MG tablet, Take 1 tablet by mouth 2 (Two) Times a Day., Disp: 60 tablet, Rfl: 11  •  lisinopril (PRINIVIL,ZESTRIL) 10 MG tablet, Take 1 tablet by mouth Daily., Disp: 90 tablet, Rfl: 3    Objective      Blood pressure 145/92, pulse 86, temperature 97.5 °F (36.4 °C), temperature source Temporal, resp. rate 16, height 180.3 cm (71\"), weight 99.3 kg (219 lb), SpO2 96 %.    Physical Exam     General " Appearance:    Alert, cooperative, no distress, appears stated age   Head:    Normocephalic, without obvious abnormality, atraumatic   Eyes:    PERRL, conjunctiva/corneas clear, EOM's intact   Ears:    Normal TM's and external ear canals, both ears   Nose:   Nares normal, septum midline, mucosa normal, no drainage   or sinus tenderness   Throat:   Lips, mucosa, and tongue normal; teeth and gums normal   Neck:   Supple, symmetrical, trachea midline, no adenopathy;        thyroid:  No enlargement/tenderness/nodules; no carotid    bruit or JVD   Back:     Symmetric, no curvature, ROM normal, no CVA tenderness   Lungs:     Clear to auscultation bilaterally, respirations unlabored   Chest wall:    No tenderness or deformity   Heart:    Regular rate and rhythm, S1 and S2 normal, no murmur,        rub or gallop   Abdomen:     Soft, non-tender, bowel sounds active all four quadrants,     no masses, no organomegaly   Extremities:   Extremities normal, atraumatic, no cyanosis or edema   Pulses:   2+ and symmetric all extremities   Skin:   Skin color, texture, turgor normal, no rashes or lesions   Lymph nodes:   Cervical, supraclavicular, and axillary nodes normal   Neurologic:   CNII-XII intact. Normal strength, sensation and reflexes       throughout      Results for orders placed or performed in visit on 06/02/21   Basic metabolic panel    Specimen: Blood   Result Value Ref Range    Glucose 106 (H) 65 - 99 mg/dL    BUN 12 8 - 23 mg/dL    Creatinine 1.26 0.76 - 1.27 mg/dL    eGFR Non African Am 57 (L) >60 mL/min/1.73    eGFR African Am 69 >60 mL/min/1.73    BUN/Creatinine Ratio 9.5 7.0 - 25.0    Sodium 140 136 - 145 mmol/L    Potassium 5.0 3.5 - 5.2 mmol/L    Chloride 101 98 - 107 mmol/L    Total CO2 31.1 (H) 22.0 - 29.0 mmol/L    Calcium 10.5 8.6 - 10.5 mg/dL   Nuclear Antigen Antibody, IFA    Specimen: Blood   Result Value Ref Range    BEATA Negative    Glomerular Basement Membrane Antibodies    Specimen: Blood   Result  Value Ref Range    GBM Ab 9 0 - 20 units   ANCA Panel    Specimen: Blood   Result Value Ref Range    Myeloperoxidase Ab <9.0 0.0 - 9.0 U/mL    Antiproteinase 3 (WV-3) <3.5 0.0 - 3.5 U/mL    C-ANCA <1:20 Neg:<1:20 titer    P-ANCA <1:20 Neg:<1:20 titer    Atypical pANCA <1:20 Neg:<1:20 titer   CK    Specimen: Blood   Result Value Ref Range    Creatine Kinase 102 20 - 200 U/L   Myoglobin, Serum    Specimen: Blood   Result Value Ref Range    Myoglobin 51.6 28.0 - 72.0 ng/mL   Microscopic Examination -   Result Value Ref Range    WBC, UA 0-2 /HPF    RBC, UA 0-2 /HPF    Epithelial Cells (non renal) Comment /HPF    Cast Type Comment     Bacteria, UA Comment None Seen /HPF   Urinalysis With Microscopic - Urine, Clean Catch    Specimen: Urine, Clean Catch   Result Value Ref Range    Specific Gravity, UA 1.006 1.005 - 1.030    pH, UA 7.0 5.0 - 8.0    Color, UA Yellow     Appearance, UA Clear Clear    Leukocytes, UA Negative Negative    Protein Negative Negative    Glucose, UA Negative Negative    Ketones Negative Negative    Blood, UA Negative Negative    Bilirubin, UA Negative Negative    Urobilinogen, UA Comment     Nitrite, UA Negative Negative         Assessment/Plan   Pt is max b-blocker due to unlying lung dz.      Diagnoses and all orders for this visit:    1. Cardiac arrhythmia, unspecified cardiac arrhythmia type (Primary)  -     lisinopril (PRINIVIL,ZESTRIL) 10 MG tablet; Take 1 tablet by mouth Daily.  Dispense: 90 tablet; Refill: 3    2. Essential hypertension  -     lisinopril (PRINIVIL,ZESTRIL) 10 MG tablet; Take 1 tablet by mouth Daily.  Dispense: 90 tablet; Refill: 3  -     Basic metabolic panel  -     CBC & Differential      Return in about 4 weeks (around 8/27/2021).     labs in 2 weeks. For undlying cri and starting or lisinopril.           There are no Patient Instructions on file for this visit.     Johnathon Allan MD    Assessment/Plan

## 2021-08-13 LAB
BASOPHILS # BLD AUTO: 0.06 10*3/MM3 (ref 0–0.2)
BASOPHILS NFR BLD AUTO: 1 % (ref 0–1.5)
BUN SERPL-MCNC: 14 MG/DL (ref 8–23)
BUN/CREAT SERPL: 10.1 (ref 7–25)
CALCIUM SERPL-MCNC: 10.3 MG/DL (ref 8.6–10.5)
CHLORIDE SERPL-SCNC: 104 MMOL/L (ref 98–107)
CO2 SERPL-SCNC: 25.2 MMOL/L (ref 22–29)
CREAT SERPL-MCNC: 1.39 MG/DL (ref 0.76–1.27)
EOSINOPHIL # BLD AUTO: 0.11 10*3/MM3 (ref 0–0.4)
EOSINOPHIL NFR BLD AUTO: 1.9 % (ref 0.3–6.2)
ERYTHROCYTE [DISTWIDTH] IN BLOOD BY AUTOMATED COUNT: 13.1 % (ref 12.3–15.4)
GLUCOSE SERPL-MCNC: 97 MG/DL (ref 65–99)
HCT VFR BLD AUTO: 46 % (ref 37.5–51)
HGB BLD-MCNC: 15.5 G/DL (ref 13–17.7)
IMM GRANULOCYTES # BLD AUTO: 0.01 10*3/MM3 (ref 0–0.05)
IMM GRANULOCYTES NFR BLD AUTO: 0.2 % (ref 0–0.5)
LYMPHOCYTES # BLD AUTO: 1.35 10*3/MM3 (ref 0.7–3.1)
LYMPHOCYTES NFR BLD AUTO: 23.5 % (ref 19.6–45.3)
MCH RBC QN AUTO: 29.8 PG (ref 26.6–33)
MCHC RBC AUTO-ENTMCNC: 33.7 G/DL (ref 31.5–35.7)
MCV RBC AUTO: 88.3 FL (ref 79–97)
MONOCYTES # BLD AUTO: 0.58 10*3/MM3 (ref 0.1–0.9)
MONOCYTES NFR BLD AUTO: 10.1 % (ref 5–12)
NEUTROPHILS # BLD AUTO: 3.64 10*3/MM3 (ref 1.7–7)
NEUTROPHILS NFR BLD AUTO: 63.3 % (ref 42.7–76)
NRBC BLD AUTO-RTO: 0 /100 WBC (ref 0–0.2)
PLATELET # BLD AUTO: 281 10*3/MM3 (ref 140–450)
POTASSIUM SERPL-SCNC: 5.9 MMOL/L (ref 3.5–5.2)
RBC # BLD AUTO: 5.21 10*6/MM3 (ref 4.14–5.8)
SODIUM SERPL-SCNC: 143 MMOL/L (ref 136–145)
WBC # BLD AUTO: 5.75 10*3/MM3 (ref 3.4–10.8)

## 2021-08-16 DIAGNOSIS — E87.5 HYPERKALEMIA: Primary | ICD-10-CM

## 2021-08-16 RX ORDER — METOPROLOL SUCCINATE 25 MG/1
25 TABLET, EXTENDED RELEASE ORAL DAILY
Qty: 90 TABLET | Refills: 3 | Status: SHIPPED | OUTPATIENT
Start: 2021-08-16 | End: 2021-08-24 | Stop reason: ALTCHOICE

## 2021-08-20 LAB
BUN SERPL-MCNC: 13 MG/DL (ref 8–23)
BUN/CREAT SERPL: 10.1 (ref 7–25)
CALCIUM SERPL-MCNC: 10.1 MG/DL (ref 8.6–10.5)
CHLORIDE SERPL-SCNC: 101 MMOL/L (ref 98–107)
CO2 SERPL-SCNC: 28.1 MMOL/L (ref 22–29)
CREAT SERPL-MCNC: 1.29 MG/DL (ref 0.76–1.27)
GLUCOSE SERPL-MCNC: 105 MG/DL (ref 65–99)
POTASSIUM SERPL-SCNC: 5 MMOL/L (ref 3.5–5.2)
SODIUM SERPL-SCNC: 137 MMOL/L (ref 136–145)

## 2021-08-24 ENCOUNTER — OFFICE VISIT (OUTPATIENT)
Dept: CARDIOLOGY | Facility: CLINIC | Age: 71
End: 2021-08-24

## 2021-08-24 VITALS
OXYGEN SATURATION: 98 % | BODY MASS INDEX: 25.48 KG/M2 | DIASTOLIC BLOOD PRESSURE: 98 MMHG | SYSTOLIC BLOOD PRESSURE: 148 MMHG | WEIGHT: 182 LBS | RESPIRATION RATE: 18 BRPM | HEIGHT: 71 IN | HEART RATE: 110 BPM

## 2021-08-24 DIAGNOSIS — R00.2 PALPITATIONS: Primary | ICD-10-CM

## 2021-08-24 DIAGNOSIS — I10 ESSENTIAL HYPERTENSION: ICD-10-CM

## 2021-08-24 PROCEDURE — 99204 OFFICE O/P NEW MOD 45 MIN: CPT | Performed by: INTERNAL MEDICINE

## 2021-08-24 PROCEDURE — 93000 ELECTROCARDIOGRAM COMPLETE: CPT | Performed by: INTERNAL MEDICINE

## 2021-08-24 RX ORDER — AMLODIPINE BESYLATE 5 MG/1
5 TABLET ORAL DAILY
Qty: 30 TABLET | Refills: 3 | Status: SHIPPED | OUTPATIENT
Start: 2021-08-24 | End: 2021-09-27 | Stop reason: SDUPTHER

## 2021-08-24 NOTE — PROGRESS NOTES
"     [facility] OP Consult Note    Mohsen Bucio  [id number]  2021    Referred By: Johnathon Allan MD    Chief Complaint: Palpitations    History of Present Illness:   Mr. Mohsen Bucio is a 71 y.o. male who presents to the Cardiology Clinic for evaluation of palpitations.  The patient has a past medical history significant for hypertension, asthma, chronic kidney disease, and Graves' disease.  He does not have any significant past cardiac history.  He presents the cardiology clinic today for evaluation of palpitations.  The patient reports a long history of palpitations described as a \"fluttering\" sensation.  The episodes may occur 2-3 times per day.  He reports the episodes may last up to 5 minutes in duration before resolving spontaneously.  He denies any associated dizziness, lightheadedness, or presyncopal symptoms.  No history of syncope.  He reports he is active at home, and denies exertional anginal symptoms.  No history of orthopnea, PND, or lower extremity swelling.  In terms of his hypertension, he was recently started lisinopril and metoprolol, however he has not taken his medications today.  No other specific complaints at this time.    Past Cardiac Testin. Last Coronary Angio: None  2. Prior Stress Testin, reportedly unremarkable  3. Last Echo: None  4. Prior Holter Monitor: None    Review of Systems:   Review of Systems   Constitutional: Negative for activity change, appetite change, chills, diaphoresis, fatigue, fever, unexpected weight gain and unexpected weight loss.   Eyes: Negative for blurred vision and double vision.   Respiratory: Negative for cough, chest tightness, shortness of breath and wheezing.    Cardiovascular: Positive for palpitations. Negative for chest pain and leg swelling.   Gastrointestinal: Negative for abdominal pain, anal bleeding, blood in stool and GERD.   Endocrine: Negative for cold intolerance and heat intolerance. "   Genitourinary: Negative for hematuria.   Neurological: Negative for dizziness, syncope, weakness and light-headedness.   Hematological: Does not bruise/bleed easily.   Psychiatric/Behavioral: Negative for depressed mood and stress. The patient is not nervous/anxious.        Past Medical History:   Past Medical History:   Diagnosis Date   • Asthma    • Bronchitis    • Bursitis    • Chest pain    • Degenerative disc disease, cervical    • Graves disease    • Hyperlipidemia    • Hyperthyroidism    • Myalgia    • Myositis    • Neuroma of foot    • Tennis elbow        Past Surgical History:   Past Surgical History:   Procedure Laterality Date   • APPENDECTOMY     • COLONOSCOPY     • HERNIA REPAIR     • INGUINAL HERNIA REPAIR     • TONSILLECTOMY         Family History:   Family History   Problem Relation Age of Onset   • Benign prostatic hyperplasia Father    • Stroke Father    • Stroke Other    • Cancer Other        Social History:   Social History     Socioeconomic History   • Marital status:      Spouse name: Not on file   • Number of children: Not on file   • Years of education: Not on file   • Highest education level: Not on file   Tobacco Use   • Smoking status: Never Smoker   • Smokeless tobacco: Never Used   Substance and Sexual Activity   • Alcohol use: Yes     Comment: history   • Drug use: No       Medications:     Current Outpatient Medications:   •  albuterol 108 (90 Base) MCG/ACT inhaler, Inhale 2 puffs Every 4 (Four) Hours As Needed for Wheezing., Disp: , Rfl:   •  Breo Ellipta 100-25 MCG/INH inhaler, , Disp: , Rfl:   •  Cyanocobalamin 2500 MCG sublingual tablet, Daily, Disp: 90 each, Rfl: 3  •  desloratadine (CLARINEX) 5 MG tablet, Take 5 mg by mouth Daily., Disp: , Rfl:   •  lisinopril (PRINIVIL,ZESTRIL) 10 MG tablet, Take 1 tablet by mouth Daily., Disp: 90 tablet, Rfl: 3  •  Praluent 150 MG/ML solution auto-injector, INJECT 150 MG PEN UNDER THE SKIN AS DIRECTED EVERY 14 DAYS, Disp: 6 pen, Rfl:  "3  •  zafirlukast (ACCOLATE) 20 MG tablet, Take 1 tablet by mouth 2 (Two) Times a Day., Disp: 60 tablet, Rfl: 11  •  amLODIPine (NORVASC) 5 MG tablet, Take 1 tablet by mouth Daily., Disp: 30 tablet, Rfl: 3    Allergies:   Allergies   Allergen Reactions   • Atorvastatin Other (See Comments)     Muscle cramps   • Pitavastatin Other (See Comments)     Muscle cramps   • Pravastatin Other (See Comments)     Muscle cramps   • Rosuvastatin Other (See Comments)     Muscle cramps   • Simvastatin Other (See Comments)     Muscle cramps   • Zetia [Ezetimibe] Myalgia   • Penicillins Other (See Comments)     Allergy testing years ago showed patient allergic       Physical Exam:  Vital Signs:   Vitals:    08/24/21 1427 08/24/21 1430   BP: 140/98 148/98   BP Location: Right arm Left arm   Patient Position: Sitting Sitting   Pulse: 110    Resp: 18    SpO2: 98%    Weight: 82.6 kg (182 lb)    Height: 180.3 cm (71\")        Physical Exam  Constitutional:       General: He is not in acute distress.     Appearance: Normal appearance. He is well-developed. He is not diaphoretic.   HENT:      Head: Normocephalic and atraumatic.   Eyes:      General: No scleral icterus.     Pupils: Pupils are equal, round, and reactive to light.   Neck:      Trachea: No tracheal deviation.   Cardiovascular:      Rate and Rhythm: Normal rate and regular rhythm.      Heart sounds: Normal heart sounds. No murmur heard.   No friction rub. No gallop.       Comments: Normal JVD.  Pulmonary:      Effort: Pulmonary effort is normal. No respiratory distress.      Breath sounds: Normal breath sounds. No stridor. No wheezing or rales.   Chest:      Chest wall: No tenderness.   Abdominal:      General: Bowel sounds are normal. There is no distension.      Palpations: Abdomen is soft.      Tenderness: There is no abdominal tenderness. There is no guarding or rebound.   Musculoskeletal:         General: No swelling. Normal range of motion.      Cervical back: Neck supple. " No tenderness.   Lymphadenopathy:      Cervical: No cervical adenopathy.   Skin:     General: Skin is warm and dry.      Findings: No erythema.   Neurological:      General: No focal deficit present.      Mental Status: He is alert and oriented to person, place, and time.   Psychiatric:         Mood and Affect: Mood normal.         Behavior: Behavior normal.         Results Review:   I reviewed the patient's new clinical results.  I personally viewed and interpreted the patient's EKG/Telemetry data      ECG 12 Lead    Date/Time: 8/24/2021 2:46 PM  Performed by: Kobi Hodge MD  Authorized by: Kobi Hodge MD   Comparison: not compared with previous ECG   Rhythm: sinus rhythm and sinus arrhythmia  Rate: normal  QRS axis: normal    Clinical impression: normal ECG            Assessment / Plan:     1. Palpitations  --Appears to have a long history of palpitations, currently of unclear etiology  --Symptoms somewhat suggestive of symptomatic ectopy, however cannot rule out tachyarrhythmia  --ECG today shows NSR, normal atrioventricular conduction  --Last TSH was within normal limits  --Will proceed with 14-day outpatient cardiac monitor to further evaluate underlying etiology of palpitations    2. Essential hypertension  --Hypertensive today, however has not taken his antihypertensives today  --Continue lisinopril  --Will discontinue metoprolol, start low-dose Norvasc given history of asthma  --Uptitrate Norvasc as required for BP control    3.  Chronic kidney disease, stage III  --Management per PCP      Follow Up:   Return in about 4 weeks (around 9/21/2021).      Thank you for allowing me to participate in the care of your patient. Please to not hesitate to contact me with additional questions or concerns.     CARLOZ Hodge MD  Interventional Cardiology   08/24/2021  14:44 EDT

## 2021-09-01 ENCOUNTER — OFFICE VISIT (OUTPATIENT)
Dept: INTERNAL MEDICINE | Facility: CLINIC | Age: 71
End: 2021-09-01

## 2021-09-01 VITALS
HEART RATE: 96 BPM | SYSTOLIC BLOOD PRESSURE: 134 MMHG | HEIGHT: 71 IN | OXYGEN SATURATION: 98 % | DIASTOLIC BLOOD PRESSURE: 80 MMHG | TEMPERATURE: 97.5 F | WEIGHT: 216 LBS | RESPIRATION RATE: 16 BRPM | BODY MASS INDEX: 30.24 KG/M2

## 2021-09-01 DIAGNOSIS — R91.8 LUNG FIELD ABNORMAL FINDING ON EXAMINATION: ICD-10-CM

## 2021-09-01 DIAGNOSIS — E03.9 ACQUIRED HYPOTHYROIDISM: ICD-10-CM

## 2021-09-01 DIAGNOSIS — E05.90 HYPERTHYROIDISM: ICD-10-CM

## 2021-09-01 DIAGNOSIS — G47.33 OSA (OBSTRUCTIVE SLEEP APNEA): ICD-10-CM

## 2021-09-01 DIAGNOSIS — E78.00 HYPERCHOLESTEROLEMIA: Primary | ICD-10-CM

## 2021-09-01 PROCEDURE — 99214 OFFICE O/P EST MOD 30 MIN: CPT | Performed by: INTERNAL MEDICINE

## 2021-09-01 NOTE — PROGRESS NOTES
"Subjective     Patient ID: Mohsen Bucio is a 71 y.o. male. Patient is here for management of multiple medical problems.     Chief Complaint   Patient presents with   • Follow-up     pt had appt with cardiologist a week ago     History of Present Illness     Palpitation. Cardiology seen.    Metoprolol was also affecting his breathing. Dr Hodge changed to Norvasc.        The following portions of the patient's history were reviewed and updated as appropriate: allergies, current medications, past family history, past medical history, past social history, past surgical history and problem list.    Review of Systems   Constitutional: Negative for diaphoresis and fatigue.   HENT: Negative for congestion and dental problem.    Psychiatric/Behavioral: Negative for self-injury and sleep disturbance. The patient is not nervous/anxious.    All other systems reviewed and are negative.      Current Outpatient Medications:   •  albuterol 108 (90 Base) MCG/ACT inhaler, Inhale 2 puffs Every 4 (Four) Hours As Needed for Wheezing., Disp: , Rfl:   •  amLODIPine (NORVASC) 5 MG tablet, Take 1 tablet by mouth Daily., Disp: 30 tablet, Rfl: 3  •  Breo Ellipta 100-25 MCG/INH inhaler, , Disp: , Rfl:   •  Cyanocobalamin 2500 MCG sublingual tablet, Daily, Disp: 90 each, Rfl: 3  •  desloratadine (CLARINEX) 5 MG tablet, Take 5 mg by mouth Daily., Disp: , Rfl:   •  lisinopril (PRINIVIL,ZESTRIL) 10 MG tablet, Take 1 tablet by mouth Daily., Disp: 90 tablet, Rfl: 3  •  Praluent 150 MG/ML solution auto-injector, INJECT 150 MG PEN UNDER THE SKIN AS DIRECTED EVERY 14 DAYS, Disp: 6 pen, Rfl: 3  •  zafirlukast (ACCOLATE) 20 MG tablet, Take 1 tablet by mouth 2 (Two) Times a Day., Disp: 60 tablet, Rfl: 11    Objective      Blood pressure 134/80, pulse 96, temperature 97.5 °F (36.4 °C), temperature source Temporal, resp. rate 16, height 180.3 cm (71\"), weight 98 kg (216 lb), SpO2 98 %.    Physical Exam     General Appearance:    Alert, cooperative, no " distress, appears stated age   Head:    Normocephalic, without obvious abnormality, atraumatic   Eyes:    PERRL, conjunctiva/corneas clear, EOM's intact   Ears:    Normal TM's and external ear canals, both ears   Nose:   Nares normal, septum midline, mucosa normal, no drainage   or sinus tenderness   Throat:   Lips, mucosa, and tongue normal; teeth and gums normal   Neck:   Supple, symmetrical, trachea midline, no adenopathy;        thyroid:  No enlargement/tenderness/nodules; no carotid    bruit or JVD   Back:     Symmetric, no curvature, ROM normal, no CVA tenderness   Lungs:     Rhonchi and wheezing in righ tlower lung.     Chest wall:    No tenderness or deformity   Heart:    Regular rate and rhythm, S1 and S2 normal, no murmur,        rub or gallop   Abdomen:     Soft, non-tender, bowel sounds active all four quadrants,     no masses, no organomegaly   Extremities:   Extremities normal, atraumatic, no cyanosis or edema   Pulses:   2+ and symmetric all extremities   Skin:   Skin color, texture, turgor normal, no rashes or lesions   Lymph nodes:   Cervical, supraclavicular, and axillary nodes normal   Neurologic:   CNII-XII intact. Normal strength, sensation and reflexes       throughout      Results for orders placed or performed in visit on 08/16/21   Basic Metabolic Panel    Specimen: Blood   Result Value Ref Range    Glucose 105 (H) 65 - 99 mg/dL    BUN 13 8 - 23 mg/dL    Creatinine 1.29 (H) 0.76 - 1.27 mg/dL    eGFR Non African Am 55 (L) >60 mL/min/1.73    eGFR African Am 67 >60 mL/min/1.73    BUN/Creatinine Ratio 10.1 7.0 - 25.0    Sodium 137 136 - 145 mmol/L    Potassium 5.0 3.5 - 5.2 mmol/L    Chloride 101 98 - 107 mmol/L    Total CO2 28.1 22.0 - 29.0 mmol/L    Calcium 10.1 8.6 - 10.5 mg/dL         Assessment/Plan       Diagnoses and all orders for this visit:    1. Hypercholesterolemia (Primary)    2. Hyperthyroidism    3. Acquired hypothyroidism    4. ANN (obstructive sleep apnea)  -     Ambulatory  Referral to Neurology      Return in about 6 weeks (around 10/13/2021).          There are no Patient Instructions on file for this visit.     Johnathon Allan MD    Assessment/Plan

## 2021-09-10 ENCOUNTER — LAB (OUTPATIENT)
Dept: LAB | Facility: HOSPITAL | Age: 71
End: 2021-09-10

## 2021-09-10 ENCOUNTER — TRANSCRIBE ORDERS (OUTPATIENT)
Dept: LAB | Facility: HOSPITAL | Age: 71
End: 2021-09-10

## 2021-09-10 DIAGNOSIS — Z20.822 COVID-19 RULED OUT: Primary | ICD-10-CM

## 2021-09-10 DIAGNOSIS — Z20.822 COVID-19 RULED OUT: ICD-10-CM

## 2021-09-10 LAB — SARS-COV-2 RNA NOSE QL NAA+PROBE: NOT DETECTED

## 2021-09-10 PROCEDURE — C9803 HOPD COVID-19 SPEC COLLECT: HCPCS

## 2021-09-10 PROCEDURE — U0004 COV-19 TEST NON-CDC HGH THRU: HCPCS

## 2021-09-10 PROCEDURE — U0005 INFEC AGEN DETEC AMPLI PROBE: HCPCS

## 2021-09-13 ENCOUNTER — HOSPITAL ENCOUNTER (OUTPATIENT)
Dept: CT IMAGING | Facility: HOSPITAL | Age: 71
Discharge: HOME | End: 2021-09-13
Admitting: INTERNAL MEDICINE

## 2021-09-13 DIAGNOSIS — R91.8 LUNG FIELD ABNORMAL FINDING ON EXAMINATION: ICD-10-CM

## 2021-09-13 PROCEDURE — 71250 CT THORAX DX C-: CPT

## 2021-09-27 ENCOUNTER — OFFICE VISIT (OUTPATIENT)
Dept: CARDIOLOGY | Facility: CLINIC | Age: 71
End: 2021-09-27

## 2021-09-27 VITALS
SYSTOLIC BLOOD PRESSURE: 106 MMHG | OXYGEN SATURATION: 97 % | BODY MASS INDEX: 30.38 KG/M2 | RESPIRATION RATE: 18 BRPM | HEIGHT: 71 IN | WEIGHT: 217 LBS | DIASTOLIC BLOOD PRESSURE: 78 MMHG | HEART RATE: 115 BPM

## 2021-09-27 DIAGNOSIS — R00.2 PALPITATIONS: Primary | ICD-10-CM

## 2021-09-27 DIAGNOSIS — I10 ESSENTIAL HYPERTENSION: ICD-10-CM

## 2021-09-27 PROCEDURE — 99214 OFFICE O/P EST MOD 30 MIN: CPT | Performed by: INTERNAL MEDICINE

## 2021-09-27 RX ORDER — AMLODIPINE BESYLATE 5 MG/1
5 TABLET ORAL DAILY
Qty: 90 TABLET | Refills: 3 | Status: SHIPPED | OUTPATIENT
Start: 2021-09-27 | End: 2022-03-03

## 2021-09-27 NOTE — PROGRESS NOTES
[facility] Cardiology Office Follow Up Note    Mohsen Bucio  [id number]  2021    Primary Care Provider: Johnathon Allan MD    Chief Complaint: Follow-up after cardiac testing    History of Present Illness:   Mr. Mohsen Bucio is a 71 y.o. male who presents to the Cardiology Clinic for follow-up of palpitations.  The patient has a past medical history significant for hypertension, asthma, chronic kidney disease, and Graves' disease.  He does not have any significant past cardiac history.  He initially presented the cardiology clinic for evaluation of occasional palpitations.  A subsequent 14-day outpatient cardiac monitor in  showed occasional supraventricular and ventricular ectopy, which was asymptomatic.  He presents the cardiology clinic today for follow-up after cardiac testing.  Since his last appointment, the patient reports minimal improvement in his palpitations.  He has not had any significant episodes of palpitations since wearing his cardiac monitor.  No presyncopal or syncopal events.  He continues to deny chest pain or exertional chest discomfort.  No other specific complaints at this time.    Past Cardiac Testin. Last Coronary Angio: None  2. Prior Stress Testin, reportedly unremarkable  3. Last Echo: None  4. Prior Holter Monitor:  14-day Holter    1.  The predominant rhythm is sinus rhythm with an average heart rate 72 bpm.   2.  Normal atrioventricular conduction.   3.  No sustained arrhythmias.   4.  Occasional supraventricular ectopy with limited runs of SVT with the longest being 6 beats in duration.  [place] 1.4%.   5.  Occasional ventricular ectopy with limited runs of NSVT with the longest being 5 beats in duration.  [place] 1.3%.   6.  Two symptomatic events corresponding to NSR.  No correlation of symptoms to ectopy.    Review of Systems:   Review of Systems   Constitutional: Negative for activity change, appetite change, chills,  "diaphoresis, fatigue, fever, unexpected weight gain and unexpected weight loss.   Eyes: Negative for blurred vision and double vision.   Respiratory: Negative for cough, chest tightness, shortness of breath and wheezing.    Cardiovascular: Negative for chest pain, palpitations and leg swelling.   Gastrointestinal: Negative for abdominal pain, anal bleeding, blood in stool and GERD.   Endocrine: Negative for cold intolerance and heat intolerance.   Genitourinary: Negative for hematuria.   Neurological: Negative for dizziness, syncope, weakness and light-headedness.   Hematological: Does not bruise/bleed easily.   Psychiatric/Behavioral: Negative for depressed mood and stress. The patient is not nervous/anxious.        I have reviewed and/or updated the patient's past medical, past surgical, family, social history, problem list and allergies as appropriate.     Medications:     Current Outpatient Medications:   •  albuterol 108 (90 Base) MCG/ACT inhaler, Inhale 2 puffs Every 4 (Four) Hours As Needed for Wheezing., Disp: , Rfl:   •  amLODIPine (NORVASC) 5 MG tablet, Take 1 tablet by mouth Daily., Disp: 90 tablet, Rfl: 3  •  Breo Ellipta 100-25 MCG/INH inhaler, , Disp: , Rfl:   •  Cyanocobalamin 2500 MCG sublingual tablet, Daily, Disp: 90 each, Rfl: 3  •  desloratadine (CLARINEX) 5 MG tablet, Take 5 mg by mouth Daily., Disp: , Rfl:   •  lisinopril (PRINIVIL,ZESTRIL) 10 MG tablet, Take 1 tablet by mouth Daily., Disp: 90 tablet, Rfl: 3  •  Praluent 150 MG/ML solution auto-injector, INJECT 150 MG PEN UNDER THE SKIN AS DIRECTED EVERY 14 DAYS, Disp: 6 pen, Rfl: 3  •  zafirlukast (ACCOLATE) 20 MG tablet, Take 1 tablet by mouth 2 (Two) Times a Day., Disp: 60 tablet, Rfl: 11    Physical Exam:  Vital Signs:   Vitals:    09/27/21 1301   BP: 106/78   BP Location: Left arm   Patient Position: Sitting   Pulse: 115   Resp: 18   SpO2: 97%   Weight: 98.4 kg (217 lb)   Height: 180.3 cm (71\")       Physical Exam  Constitutional:       " General: He is not in acute distress.     Appearance: Normal appearance. He is well-developed. He is not diaphoretic.   HENT:      Head: Normocephalic and atraumatic.   Eyes:      General: No scleral icterus.     Pupils: Pupils are equal, round, and reactive to light.   Neck:      Trachea: No tracheal deviation.   Cardiovascular:      Rate and Rhythm: Normal rate and regular rhythm.      Heart sounds: Normal heart sounds. No murmur heard.   No friction rub. No gallop.       Comments: Normal JVD.  Pulmonary:      Effort: Pulmonary effort is normal. No respiratory distress.      Breath sounds: Normal breath sounds. No stridor. No wheezing or rales.   Chest:      Chest wall: No tenderness.   Abdominal:      General: Bowel sounds are normal. There is no distension.      Palpations: Abdomen is soft.      Tenderness: There is no abdominal tenderness. There is no guarding or rebound.   Musculoskeletal:         General: No swelling. Normal range of motion.      Cervical back: Neck supple. No tenderness.   Lymphadenopathy:      Cervical: No cervical adenopathy.   Skin:     General: Skin is warm and dry.      Findings: No erythema.   Neurological:      General: No focal deficit present.      Mental Status: He is alert and oriented to person, place, and time.   Psychiatric:         Mood and Affect: Mood normal.         Behavior: Behavior normal.         Results Review:   I reviewed the patient's new clinical results.        Assessment / Plan:     1. Palpitations  --Palpitations, somewhat suggestive of symptomatic ectopy  --Prior ECG with normal atrioventricular conduction  --Last TSH was within normal limits  --14-day outpatient cardiac monitoring showed rare supraventricular and ventricular ectopy, which was asymptomatic  --Given unremarkable outpatient cardiac monitor and interval improvement in symptoms, no indication for further work-up at this time  --If recurrent palpitations, would start trial of low-dose metoprolol for  suppression and monitor for improvement in symptoms  --Follow-up in 6 months, sooner if required     2. Essential hypertension  --BP currently well controlled  --Continue current antihypertensives     3.  Chronic kidney disease, stage III  --Management per PCP    Follow Up:   Return in about 6 months (around 3/27/2022).      Thank you for allowing me to participate in the care of your patient. Please to not hesitate to contact me with additional questions or concerns.     CARLOZ Hodge MD  Interventional Cardiology   09/27/2021  13:03 EDT

## 2021-10-11 ENCOUNTER — LAB (OUTPATIENT)
Dept: LAB | Facility: HOSPITAL | Age: 71
End: 2021-10-11

## 2021-10-11 ENCOUNTER — TRANSCRIBE ORDERS (OUTPATIENT)
Dept: LAB | Facility: HOSPITAL | Age: 71
End: 2021-10-11

## 2021-10-11 DIAGNOSIS — Z20.822 COVID-19 RULED OUT: ICD-10-CM

## 2021-10-11 DIAGNOSIS — Z20.822 COVID-19 RULED OUT: Primary | ICD-10-CM

## 2021-10-11 LAB — SARS-COV-2 RNA NOSE QL NAA+PROBE: NOT DETECTED

## 2021-10-11 PROCEDURE — U0005 INFEC AGEN DETEC AMPLI PROBE: HCPCS

## 2021-10-11 PROCEDURE — U0004 COV-19 TEST NON-CDC HGH THRU: HCPCS

## 2021-10-12 ENCOUNTER — OFFICE VISIT (OUTPATIENT)
Dept: NEUROLOGY | Facility: CLINIC | Age: 71
End: 2021-10-12

## 2021-10-12 VITALS
HEIGHT: 71 IN | BODY MASS INDEX: 29.68 KG/M2 | HEART RATE: 115 BPM | TEMPERATURE: 97.5 F | SYSTOLIC BLOOD PRESSURE: 112 MMHG | OXYGEN SATURATION: 97 % | WEIGHT: 212 LBS | DIASTOLIC BLOOD PRESSURE: 82 MMHG

## 2021-10-12 DIAGNOSIS — G47.33 OBSTRUCTIVE SLEEP APNEA (ADULT) (PEDIATRIC): ICD-10-CM

## 2021-10-12 DIAGNOSIS — I10 ESSENTIAL HYPERTENSION: ICD-10-CM

## 2021-10-12 DIAGNOSIS — E78.5 DYSLIPIDEMIA: Primary | ICD-10-CM

## 2021-10-12 DIAGNOSIS — R00.0 TACHYCARDIA: ICD-10-CM

## 2021-10-12 DIAGNOSIS — G47.9 RESTLESS SLEEPER: ICD-10-CM

## 2021-10-12 DIAGNOSIS — R06.83 SNORING: ICD-10-CM

## 2021-10-12 PROCEDURE — 99203 OFFICE O/P NEW LOW 30 MIN: CPT | Performed by: NURSE PRACTITIONER

## 2021-10-12 NOTE — PROGRESS NOTES
New Sleep Patient Office Visit      Patient Name: Mohsen Bucio  : 1950   MRN: [id number]     Referring Physician: Johnathon Allan MD    Chief Complaint:    Chief Complaint   Patient presents with   • Sleeping Problem     Patient in office to establish care for denia has concerns of restless sleep       History of Present Illness: Mohsen Bucio is a 71 y.o. male who is here today to establish care with Sleep Medicine.  He complains of experiencing restless sleep.  He wakes up during the night and can not go back to sleep.  Sleep questionnaire reviewed.  It takes him 15-20 minutes to fall asleep initially, he wakes up once during the night, he sleeps about 6 hours per night, he feels rested upon awakening, he feels better with more sleep, he snores at times, he some times wakes up with a dry mouth, he has nightmares on some nights, he has vivid dream-like mental images upon awakening or going to sleep at times, he has heartburn that interferes with sleep, he denies excessive daytime sleepiness.  He denies acting out his dreams during sleep.  Additional risk factors- BMI 29, HTN, dyslipidemia.     Stanchfield Score: 2    Subjective      Review of Systems:   Review of Systems   Constitutional: Negative for fatigue, fever, unexpected weight gain and unexpected weight loss.   HENT: Negative for hearing loss, sore throat, swollen glands, tinnitus and trouble swallowing.    Eyes: Negative for blurred vision, double vision, photophobia and visual disturbance.   Respiratory: Negative for cough, chest tightness and shortness of breath.    Cardiovascular: Negative for chest pain, palpitations and leg swelling.   Gastrointestinal: Negative for constipation, diarrhea and nausea.   Endocrine: Negative for cold intolerance and heat intolerance.   Musculoskeletal: Negative for gait problem, neck pain and neck stiffness.   Skin: Negative for color change and rash.   Allergic/Immunologic: Negative for environmental  allergies and food allergies.   Neurological: Negative for dizziness, syncope, facial asymmetry, speech difficulty, weakness, headache, memory problem and confusion.   Psychiatric/Behavioral: Positive for sleep disturbance. Negative for agitation, behavioral problems and depressed mood. The patient is not nervous/anxious.        Past Medical History:   Past Medical History:   Diagnosis Date   • Asthma    • Bronchitis    • Bursitis    • Chest pain    • Degenerative disc disease, cervical    • Graves disease    • Hyperlipidemia    • Hyperthyroidism    • Myalgia    • Myositis    • Neuroma of foot    • Tennis elbow        Past Surgical History:   Past Surgical History:   Procedure Laterality Date   • APPENDECTOMY     • COLONOSCOPY     • HERNIA REPAIR     • INGUINAL HERNIA REPAIR     • TONSILLECTOMY         Family History:   Family History   Problem Relation Age of Onset   • Benign prostatic hyperplasia Father    • Stroke Father    • Stroke Other    • Cancer Other        Social History:   Social History     Socioeconomic History   • Marital status:    Tobacco Use   • Smoking status: Never Smoker   • Smokeless tobacco: Never Used   Substance and Sexual Activity   • Alcohol use: Yes     Comment: occ   • Drug use: No       Medications:     Current Outpatient Medications:   •  albuterol 108 (90 Base) MCG/ACT inhaler, Inhale 2 puffs Every 4 (Four) Hours As Needed for Wheezing., Disp: , Rfl:   •  amLODIPine (NORVASC) 5 MG tablet, Take 1 tablet by mouth Daily., Disp: 90 tablet, Rfl: 3  •  Breo Ellipta 100-25 MCG/INH inhaler, , Disp: , Rfl:   •  Cyanocobalamin 2500 MCG sublingual tablet, Daily, Disp: 90 each, Rfl: 3  •  desloratadine (CLARINEX) 5 MG tablet, Take 5 mg by mouth Daily., Disp: , Rfl:   •  lisinopril (PRINIVIL,ZESTRIL) 10 MG tablet, Take 1 tablet by mouth Daily., Disp: 90 tablet, Rfl: 3  •  zafirlukast (ACCOLATE) 20 MG tablet, Take 1 tablet by mouth 2 (Two) Times a Day., Disp: 60 tablet, Rfl: 11  •  Praluent  "150 MG/ML solution auto-injector, INJECT 150 MG PEN UNDER THE SKIN AS DIRECTED EVERY 14 DAYS, Disp: 6 pen, Rfl: 3    Allergies:   Allergies   Allergen Reactions   • Atorvastatin Other (See Comments)     Muscle cramps   • Pitavastatin Other (See Comments)     Muscle cramps   • Pravastatin Other (See Comments)     Muscle cramps   • Rosuvastatin Other (See Comments)     Muscle cramps   • Simvastatin Other (See Comments)     Muscle cramps   • Zetia [Ezetimibe] Myalgia   • Penicillins Other (See Comments)     Allergy testing years ago showed patient allergic       Objective     Physical Exam:  Vital Signs:   Vitals:    10/12/21 0925   BP: 112/82   BP Location: Right arm   Patient Position: Sitting   Cuff Size: Adult   Pulse: 115   Temp: 97.5 °F (36.4 °C)   TempSrc: Temporal   SpO2: 97%   Weight: 96.2 kg (212 lb)   Height: 180.3 cm (70.98\")   PainSc: 0-No pain     BMI: Body mass index is 29.58 kg/m².  Neck Circumference: 17 inches    Physical Exam  Vitals and nursing note reviewed.   Constitutional:       General: He is not in acute distress.     Appearance: Normal appearance. He is well-developed. He is not diaphoretic.   HENT:      Head: Normocephalic and atraumatic.      Comments: Mallampati 4  Eyes:      Conjunctiva/sclera: Conjunctivae normal.      Pupils: Pupils are equal, round, and reactive to light.   Neck:      Trachea: Trachea normal.   Cardiovascular:      Rate and Rhythm: Regular rhythm. Tachycardia present.      Heart sounds: Normal heart sounds. No murmur heard.  No friction rub. No gallop.    Pulmonary:      Effort: Pulmonary effort is normal. No respiratory distress.      Breath sounds: Normal breath sounds. No wheezing or rales.   Musculoskeletal:         General: Normal range of motion.   Skin:     General: Skin is warm and dry.      Findings: No rash.   Neurological:      Mental Status: He is alert and oriented to person, place, and time.   Psychiatric:         Mood and Affect: Mood normal.         " Behavior: Behavior normal.         Thought Content: Thought content normal.         Judgment: Judgment normal.         Assessment / Plan      Assessment/Plan:   Diagnoses and all orders for this visit:    1. Dyslipidemia (Primary)  -     Home Sleep Study; Future    2. Essential hypertension  -     Home Sleep Study; Future    3. Snoring  -     Home Sleep Study; Future    4. Restless sleeper  -     Home Sleep Study; Future    5. BMI 29.0-29.9,adult  -     Home Sleep Study; Future    6. Obstructive sleep apnea (adult) (pediatric)   -     Home Sleep Study; Future    *Offered PSG but patient would prefer a home sleep study.   *Education on ANN provided.    *Discussed the possibility of medications for sleep-Remfresh samples provided today.     Follow Up:   Return in about 3 months (around 1/12/2022) for F/U Obstructive Sleep Apnea.    I have advised the patient the need to continue the use of CPAP.  Gold standard for treatment of sleep apnea includes weight loss, use of cpap and avoidance of alcohol.  Untreated ANN may increase the risk for development of hypertension, stroke, myocardial infarction, diabetes, cardiovascular disease, work-related issues and driving accidents. I have counseled and advised the patient to avoid driving or operating heavy/dangerous equipment if feeling drowsy.     LUCITA Callejas, FNP-C  [facility] Neurology and Sleep Medicine       Please note that portions of this note may have been completed with a voice recognition program. Efforts were made to edit the dictations, but occasionally words are mistranscribed.

## 2021-10-12 NOTE — PATIENT INSTRUCTIONS
Sleep Apnea  Sleep apnea affects breathing during sleep. It causes breathing to stop for a short time or to become shallow. It can also increase the risk of:  · Heart attack.  · Stroke.  · Being very overweight (obese).  · Diabetes.  · Heart failure.  · Irregular heartbeat.  The goal of treatment is to help you breathe normally again.  What are the causes?  There are three kinds of sleep apnea:  · Obstructive sleep apnea. This is caused by a blocked or collapsed airway.  · Central sleep apnea. This happens when the brain does not send the right signals to the muscles that control breathing.  · Mixed sleep apnea. This is a combination of obstructive and central sleep apnea.  The most common cause of this condition is a collapsed or blocked airway. This can happen if:  · Your throat muscles are too relaxed.  · Your tongue and tonsils are too large.  · You are overweight.  · Your airway is too small.  What increases the risk?  · Being overweight.  · Smoking.  · Having a small airway.  · Being older.  · Being male.  · Drinking alcohol.  · Taking medicines to calm yourself (sedatives or tranquilizers).  · Having family members with the condition.  What are the signs or symptoms?  · Trouble staying asleep.  · Being sleepy or tired during the day.  · Getting angry a lot.  · Loud snoring.  · Headaches in the morning.  · Not being able to focus your mind (concentrate).  · Forgetting things.  · Less interest in sex.  · Mood swings.  · Personality changes.  · Feelings of sadness (depression).  · Waking up a lot during the night to pee (urinate).  · Dry mouth.  · Sore throat.  How is this diagnosed?  · Your medical history.  · A physical exam.  · A test that is done when you are sleeping (sleep study). The test is most often done in a sleep lab but may also be done at home.  How is this treated?    · Sleeping on your side.  · Using a medicine to get rid of mucus in your nose (decongestant).  · Avoiding the use of alcohol,  medicines to help you relax, or certain pain medicines (narcotics).  · Losing weight, if needed.  · Changing your diet.  · Not smoking.  · Using a machine to open your airway while you sleep, such as:  ? An oral appliance. This is a mouthpiece that shifts your lower jaw forward.  ? A CPAP device. This device blows air through a mask when you breathe out (exhale).  ? An EPAP device. This has valves that you put in each nostril.  ? A BPAP device. This device blows air through a mask when you breathe in (inhale) and breathe out.  · Having surgery if other treatments do not work.  It is important to get treatment for sleep apnea. Without treatment, it can lead to:  · High blood pressure.  · Coronary artery disease.  · In men, not being able to have an erection (impotence).  · Reduced thinking ability.  Follow these instructions at home:  Lifestyle  · Make changes that your doctor recommends.  · Eat a healthy diet.  · Lose weight if needed.  · Avoid alcohol, medicines to help you relax, and some pain medicines.  · Do not use any products that contain nicotine or tobacco, such as cigarettes, e-cigarettes, and chewing tobacco. If you need help quitting, ask your doctor.  General instructions  · Take over-the-counter and prescription medicines only as told by your doctor.  · If you were given a machine to use while you sleep, use it only as told by your doctor.  · If you are having surgery, make sure to tell your doctor you have sleep apnea. You may need to bring your device with you.  · Keep all follow-up visits as told by your doctor. This is important.  Contact a doctor if:  · The machine that you were given to use during sleep bothers you or does not seem to be working.  · You do not get better.  · You get worse.  Get help right away if:  · Your chest hurts.  · You have trouble breathing in enough air.  · You have an uncomfortable feeling in your back, arms, or stomach.  · You have trouble talking.  · One side of your  body feels weak.  · A part of your face is hanging down.  These symptoms may be an emergency. Do not wait to see if the symptoms will go away. Get medical help right away. Call your local emergency services (911 in the [place] Do not drive yourself to the hospital.  Summary  · This condition affects breathing during sleep.  · The most common cause is a collapsed or blocked airway.  · The goal of treatment is to help you breathe normally while you sleep.  This information is not intended to replace advice given to you by your health care provider. Make sure you discuss any questions you have with your health care provider.  Document Revised: 10/04/2019 Document Reviewed: 08/13/2019  [employer] Patient Education © 2021 [employer]

## 2021-10-18 ENCOUNTER — IMMUNIZATION (OUTPATIENT)
Dept: VACCINE CLINIC | Facility: HOSPITAL | Age: 71
End: 2021-10-18

## 2021-10-18 PROCEDURE — 91300 HC SARSCOV02 VAC 30MCG/0.3ML IM: CPT | Performed by: INTERNAL MEDICINE

## 2021-10-18 PROCEDURE — 0004A ADM SARSCOV2 30MCG/0.3ML BOOSTER: CPT | Performed by: INTERNAL MEDICINE

## 2021-12-01 ENCOUNTER — OFFICE VISIT (OUTPATIENT)
Dept: INTERNAL MEDICINE | Facility: CLINIC | Age: 71
End: 2021-12-01

## 2021-12-01 VITALS
DIASTOLIC BLOOD PRESSURE: 88 MMHG | HEART RATE: 94 BPM | WEIGHT: 215 LBS | HEIGHT: 71 IN | SYSTOLIC BLOOD PRESSURE: 138 MMHG | TEMPERATURE: 97.8 F | RESPIRATION RATE: 16 BRPM | OXYGEN SATURATION: 99 % | BODY MASS INDEX: 30.1 KG/M2

## 2021-12-01 DIAGNOSIS — E78.00 HYPERCHOLESTEREMIA: Primary | ICD-10-CM

## 2021-12-01 DIAGNOSIS — N18.2 CRI (CHRONIC RENAL INSUFFICIENCY), STAGE 2 (MILD): ICD-10-CM

## 2021-12-01 PROCEDURE — 99214 OFFICE O/P EST MOD 30 MIN: CPT | Performed by: INTERNAL MEDICINE

## 2021-12-01 RX ORDER — LOSARTAN POTASSIUM 50 MG/1
50 TABLET ORAL DAILY
Qty: 90 TABLET | Refills: 3 | Status: SHIPPED | OUTPATIENT
Start: 2021-12-01 | End: 2022-03-03

## 2021-12-01 RX ORDER — ALIROCUMAB 150 MG/ML
150 INJECTION, SOLUTION SUBCUTANEOUS
Qty: 6 PEN | Refills: 3 | Status: SHIPPED | OUTPATIENT
Start: 2021-12-01 | End: 2022-11-14

## 2021-12-01 RX ORDER — ALIROCUMAB 150 MG/ML
150 INJECTION, SOLUTION SUBCUTANEOUS
Qty: 6 PEN | Refills: 3 | Status: SHIPPED | OUTPATIENT
Start: 2021-12-01 | End: 2021-12-01 | Stop reason: SDUPTHER

## 2021-12-01 NOTE — PROGRESS NOTES
"Subjective     Patient ID: Mohsen Bucio is a 71 y.o. male. Patient is here for management of multiple medical problems.     Cough        History of Present Illness       Cough may be worse.  Ct neg. Abnormal lung exam right lower lung.   Stopped lisinopril no better  Norvasc stopped and cough improved for a while.        The following portions of the patient's history were reviewed and updated as appropriate: allergies, current medications, past family history, past medical history, past social history, past surgical history and problem list.    Review of Systems   Constitutional: Negative for fatigue.   Genitourinary: Negative for scrotal swelling and testicular pain.   Psychiatric/Behavioral: Negative for self-injury. The patient is not hyperactive.    All other systems reviewed and are negative.      Current Outpatient Medications:   •  albuterol 108 (90 Base) MCG/ACT inhaler, Inhale 2 puffs Every 4 (Four) Hours As Needed for Wheezing., Disp: , Rfl:   •  Alirocumab (Praluent) 150 MG/ML injection pen, Inject 1 mL under the skin into the appropriate area as directed Every 14 (Fourteen) Days., Disp: 6 pen, Rfl: 3  •  amLODIPine (NORVASC) 5 MG tablet, Take 1 tablet by mouth Daily., Disp: 90 tablet, Rfl: 3  •  Breo Ellipta 100-25 MCG/INH inhaler, , Disp: , Rfl:   •  Cyanocobalamin 2500 MCG sublingual tablet, Daily, Disp: 90 each, Rfl: 3  •  desloratadine (CLARINEX) 5 MG tablet, Take 5 mg by mouth Daily., Disp: , Rfl:   •  lisinopril (PRINIVIL,ZESTRIL) 10 MG tablet, Take 1 tablet by mouth Daily., Disp: 90 tablet, Rfl: 3  •  zafirlukast (ACCOLATE) 20 MG tablet, Take 1 tablet by mouth 2 (Two) Times a Day., Disp: 60 tablet, Rfl: 11  •  losartan (Cozaar) 50 MG tablet, Take 1 tablet by mouth Daily., Disp: 90 tablet, Rfl: 3    Objective      Blood pressure 138/88, pulse 94, temperature 97.8 °F (36.6 °C), resp. rate 16, height 180.3 cm (70.98\"), weight 97.5 kg (215 lb), SpO2 99 %.    Physical Exam     General Appearance:   "  Alert, cooperative, no distress, appears stated age   Head:    Normocephalic, without obvious abnormality, atraumatic   Eyes:    PERRL, conjunctiva/corneas clear, EOM's intact   Ears:    Normal TM's and external ear canals, both ears   Nose:   Nares normal, septum midline, mucosa normal, no drainage   or sinus tenderness   Throat:   Lips, mucosa, and tongue normal; teeth and gums normal   Neck:   Supple, symmetrical, trachea midline, no adenopathy;        thyroid:  No enlargement/tenderness/nodules; no carotid    bruit or JVD   Back:     Symmetric, no curvature, ROM normal, no CVA tenderness   Lungs:     Clear to auscultation bilaterally, respirations unlabored   Chest wall:    No tenderness or deformity   Heart:    Regular rate and rhythm, S1 and S2 normal, no murmur,        rub or gallop   Abdomen:     Soft, non-tender, bowel sounds active all four quadrants,     no masses, no organomegaly   Extremities:   Extremities normal, atraumatic, no cyanosis or edema   Pulses:   2+ and symmetric all extremities   Skin:   Skin color, texture, turgor normal, no rashes or lesions   Lymph nodes:   Cervical, supraclavicular, and axillary nodes normal   Neurologic:   CNII-XII intact. Normal strength, sensation and reflexes       throughout      Results for orders placed or performed in visit on 10/11/21   COVID-19 PCR, [facility], NP SWAB IN [place]EX[facility] VIRAL TRANSPORT MEDIA/ORAL SWISH 24-30 HR TAT - Swab, Nasopharynx    Specimen: Nasopharynx; Swab   Result Value Ref Range    SARS-CoV-2 ANGELLA Not Detected Not Detected         Assessment/Plan       Diagnoses and all orders for this visit:    1. Hypercholesteremia (Primary)  -     Vitamin B12  -     Lipid Panel  -     CBC & Differential  -     Comprehensive Metabolic Panel  -     TSH    2. CRI (chronic renal insufficiency), stage 2 (mild)  -     Vitamin B12  -     Lipid Panel  -     CBC & Differential  -     Comprehensive Metabolic Panel  -     TSH    Other orders  -     Discontinue:  Alirocumab (Praluent) 150 MG/ML injection pen; Inject 1 mL under the skin into the appropriate area as directed Every 14 (Fourteen) Days.  Dispense: 6 pen; Refill: 3  -     losartan (Cozaar) 50 MG tablet; Take 1 tablet by mouth Daily.  Dispense: 90 tablet; Refill: 3  -     Alirocumab (Praluent) 150 MG/ML injection pen; Inject 1 mL under the skin into the appropriate area as directed Every 14 (Fourteen) Days.  Dispense: 6 pen; Refill: 3      Return in about 3 months (around 3/1/2022).          There are no Patient Instructions on file for this visit.     Johnathon Allan MD    Assessment/Plan

## 2021-12-02 LAB
ALBUMIN SERPL-MCNC: 4.7 G/DL (ref 3.5–5.2)
ALBUMIN/GLOB SERPL: 2 G/DL
ALP SERPL-CCNC: 44 U/L (ref 39–117)
ALT SERPL-CCNC: 27 U/L (ref 1–41)
AST SERPL-CCNC: 23 U/L (ref 1–40)
BASOPHILS # BLD AUTO: 0.04 10*3/MM3 (ref 0–0.2)
BASOPHILS NFR BLD AUTO: 0.8 % (ref 0–1.5)
BILIRUB SERPL-MCNC: 0.5 MG/DL (ref 0–1.2)
BUN SERPL-MCNC: 12 MG/DL (ref 8–23)
BUN/CREAT SERPL: 9.8 (ref 7–25)
CALCIUM SERPL-MCNC: 10.2 MG/DL (ref 8.6–10.5)
CHLORIDE SERPL-SCNC: 104 MMOL/L (ref 98–107)
CHOLEST SERPL-MCNC: 113 MG/DL (ref 0–200)
CO2 SERPL-SCNC: 28 MMOL/L (ref 22–29)
CREAT SERPL-MCNC: 1.22 MG/DL (ref 0.76–1.27)
EOSINOPHIL # BLD AUTO: 0.16 10*3/MM3 (ref 0–0.4)
EOSINOPHIL NFR BLD AUTO: 3.1 % (ref 0.3–6.2)
ERYTHROCYTE [DISTWIDTH] IN BLOOD BY AUTOMATED COUNT: 13.1 % (ref 12.3–15.4)
GLOBULIN SER CALC-MCNC: 2.4 GM/DL
GLUCOSE SERPL-MCNC: 109 MG/DL (ref 65–99)
HCT VFR BLD AUTO: 49 % (ref 37.5–51)
HDLC SERPL-MCNC: 40 MG/DL (ref 40–60)
HGB BLD-MCNC: 16.1 G/DL (ref 13–17.7)
IMM GRANULOCYTES # BLD AUTO: 0.02 10*3/MM3 (ref 0–0.05)
IMM GRANULOCYTES NFR BLD AUTO: 0.4 % (ref 0–0.5)
LDLC SERPL CALC-MCNC: 43 MG/DL (ref 0–100)
LYMPHOCYTES # BLD AUTO: 1.46 10*3/MM3 (ref 0.7–3.1)
LYMPHOCYTES NFR BLD AUTO: 27.9 % (ref 19.6–45.3)
MCH RBC QN AUTO: 29.4 PG (ref 26.6–33)
MCHC RBC AUTO-ENTMCNC: 32.9 G/DL (ref 31.5–35.7)
MCV RBC AUTO: 89.4 FL (ref 79–97)
MONOCYTES # BLD AUTO: 0.44 10*3/MM3 (ref 0.1–0.9)
MONOCYTES NFR BLD AUTO: 8.4 % (ref 5–12)
NEUTROPHILS # BLD AUTO: 3.12 10*3/MM3 (ref 1.7–7)
NEUTROPHILS NFR BLD AUTO: 59.4 % (ref 42.7–76)
NRBC BLD AUTO-RTO: 0 /100 WBC (ref 0–0.2)
PLATELET # BLD AUTO: 312 10*3/MM3 (ref 140–450)
POTASSIUM SERPL-SCNC: 5.6 MMOL/L (ref 3.5–5.2)
PROT SERPL-MCNC: 7.1 G/DL (ref 6–8.5)
RBC # BLD AUTO: 5.48 10*6/MM3 (ref 4.14–5.8)
SODIUM SERPL-SCNC: 139 MMOL/L (ref 136–145)
TRIGL SERPL-MCNC: 180 MG/DL (ref 0–150)
TSH SERPL DL<=0.005 MIU/L-ACNC: 0.34 UIU/ML (ref 0.27–4.2)
VIT B12 SERPL-MCNC: 712 PG/ML (ref 211–946)
VLDLC SERPL CALC-MCNC: 30 MG/DL (ref 5–40)
WBC # BLD AUTO: 5.24 10*3/MM3 (ref 3.4–10.8)

## 2021-12-27 ENCOUNTER — HOSPITAL ENCOUNTER (OUTPATIENT)
Dept: SLEEP MEDICINE | Facility: HOSPITAL | Age: 71
Discharge: HOME | End: 2021-12-27
Admitting: NURSE PRACTITIONER

## 2021-12-27 DIAGNOSIS — G47.33 OBSTRUCTIVE SLEEP APNEA (ADULT) (PEDIATRIC): ICD-10-CM

## 2021-12-27 DIAGNOSIS — I10 ESSENTIAL HYPERTENSION: ICD-10-CM

## 2021-12-27 DIAGNOSIS — E78.5 DYSLIPIDEMIA: ICD-10-CM

## 2021-12-27 DIAGNOSIS — G47.9 RESTLESS SLEEPER: ICD-10-CM

## 2021-12-27 DIAGNOSIS — R06.83 SNORING: ICD-10-CM

## 2021-12-27 PROCEDURE — 95806 SLEEP STUDY UNATT&RESP EFFT: CPT | Performed by: INTERNAL MEDICINE

## 2021-12-27 PROCEDURE — 95806 SLEEP STUDY UNATT&RESP EFFT: CPT

## 2022-02-23 DIAGNOSIS — N28.9 RENAL INSUFFICIENCY: Primary | ICD-10-CM

## 2022-02-24 LAB
BUN SERPL-MCNC: 15 MG/DL (ref 8–23)
BUN/CREAT SERPL: 11.3 (ref 7–25)
CALCIUM SERPL-MCNC: 10.1 MG/DL (ref 8.6–10.5)
CHLORIDE SERPL-SCNC: 101 MMOL/L (ref 98–107)
CO2 SERPL-SCNC: 27.6 MMOL/L (ref 22–29)
CREAT SERPL-MCNC: 1.33 MG/DL (ref 0.76–1.27)
GLUCOSE SERPL-MCNC: 110 MG/DL (ref 65–99)
POTASSIUM SERPL-SCNC: 5.8 MMOL/L (ref 3.5–5.2)
SODIUM SERPL-SCNC: 137 MMOL/L (ref 136–145)

## 2022-03-01 ENCOUNTER — TELEPHONE (OUTPATIENT)
Dept: NEUROLOGY | Facility: CLINIC | Age: 72
End: 2022-03-01

## 2022-03-01 NOTE — TELEPHONE ENCOUNTER
Provider:  MAU GALARZA    Caller:  PARVEEN  Relationship to Patient:  PT     Phone Number: [phone]  Reason for Call:  PT CALLING IN STATES HE HAD HIS SLEEP STUDY BACK IN DEC 2021 HE HAS NOT HEARD FROM ANYONE REGARDING HIS SLEEP MACHINE . DOES NOT KNOW WHO THE [employer] COMPANY WOULD BE TO CONTACT . PLEASE CONTACT PT WITH UPDATED INFORMATION OR ADVISE FOR F/U APPT   When was the patient last seen: 10/12/2021

## 2022-03-01 NOTE — TELEPHONE ENCOUNTER
Called patient and let him know his order was faxed to a company called [employer] and that it might be a little bit before they reach out to him due to the national wide shortage.       Patient voiced understanding.

## 2022-03-03 ENCOUNTER — OFFICE VISIT (OUTPATIENT)
Dept: INTERNAL MEDICINE | Facility: CLINIC | Age: 72
End: 2022-03-03

## 2022-03-03 VITALS
HEIGHT: 71 IN | SYSTOLIC BLOOD PRESSURE: 128 MMHG | OXYGEN SATURATION: 99 % | BODY MASS INDEX: 30.24 KG/M2 | WEIGHT: 216 LBS | RESPIRATION RATE: 16 BRPM | DIASTOLIC BLOOD PRESSURE: 82 MMHG | HEART RATE: 83 BPM | TEMPERATURE: 98 F

## 2022-03-03 DIAGNOSIS — N18.30 CHRONIC RENAL IMPAIRMENT, STAGE 3 (MODERATE), UNSPECIFIED WHETHER STAGE 3A OR 3B CKD: ICD-10-CM

## 2022-03-03 DIAGNOSIS — I49.9 CARDIAC ARRHYTHMIA, UNSPECIFIED CARDIAC ARRHYTHMIA TYPE: ICD-10-CM

## 2022-03-03 DIAGNOSIS — I10 ESSENTIAL HYPERTENSION: ICD-10-CM

## 2022-03-03 DIAGNOSIS — K21.00 GASTROESOPHAGEAL REFLUX DISEASE WITH ESOPHAGITIS WITHOUT HEMORRHAGE: Primary | ICD-10-CM

## 2022-03-03 PROCEDURE — 1170F FXNL STATUS ASSESSED: CPT | Performed by: INTERNAL MEDICINE

## 2022-03-03 PROCEDURE — 99214 OFFICE O/P EST MOD 30 MIN: CPT | Performed by: INTERNAL MEDICINE

## 2022-03-03 PROCEDURE — 1159F MED LIST DOCD IN RCRD: CPT | Performed by: INTERNAL MEDICINE

## 2022-03-03 PROCEDURE — G0439 PPPS, SUBSEQ VISIT: HCPCS | Performed by: INTERNAL MEDICINE

## 2022-03-03 RX ORDER — FAMOTIDINE 20 MG/1
20 TABLET, FILM COATED ORAL 2 TIMES DAILY PRN
Qty: 180 TABLET | Refills: 3 | Status: SHIPPED | OUTPATIENT
Start: 2022-03-03

## 2022-03-03 RX ORDER — LISINOPRIL 20 MG/1
20 TABLET ORAL DAILY
Qty: 90 TABLET | Refills: 3 | Status: SHIPPED | OUTPATIENT
Start: 2022-03-03 | End: 2022-09-02 | Stop reason: SDUPTHER

## 2022-03-03 NOTE — PROGRESS NOTES
QUICK REFERENCE INFORMATION:  The ABCs of the Annual Wellness Visit    Medicare Annual Wellness Visit    Subjective   History of Present Illness    Mohsen Bucio is a 72 y.o. male who presents for an Annual Wellness Visit. In addition, we addressed the following health issues:      Chronic pain. 0/10            PMH, PSH, SocHx, FamHx, Allergies, and Medications: Reviewed and updated.     Outpatient Medications Prior to Visit   Medication Sig Dispense Refill   • albuterol 108 (90 Base) MCG/ACT inhaler Inhale 2 puffs Every 4 (Four) Hours As Needed for Wheezing.     • Alirocumab (Praluent) 150 MG/ML injection pen Inject 1 mL under the skin into the appropriate area as directed Every 14 (Fourteen) Days. 6 pen 3   • Breo Ellipta 100-25 MCG/INH inhaler      • Cyanocobalamin 2500 MCG sublingual tablet Daily 90 each 3   • desloratadine (CLARINEX) 5 MG tablet Take 5 mg by mouth Daily.     • zafirlukast (ACCOLATE) 20 MG tablet Take 1 tablet by mouth 2 (Two) Times a Day. 60 tablet 11   • lisinopril (PRINIVIL,ZESTRIL) 10 MG tablet Take 1 tablet by mouth Daily. 90 tablet 3   • losartan (Cozaar) 50 MG tablet Take 1 tablet by mouth Daily. 90 tablet 3   • amLODIPine (NORVASC) 5 MG tablet Take 1 tablet by mouth Daily. 90 tablet 3     No facility-administered medications prior to visit.       Patient Active Problem List   Diagnosis   • Atopic rhinitis   • Asthma   • Chronic cough   • Hypercholesterolemia   • Hyperthyroidism   • Hypothyroidism   • Uninodular goiter   • Multiple thyroid nodules   • Cobalamin deficiency   • Bilateral impacted cerumen       Health Habits:  Dental Exam. up to date  Eye Exam. up to date  No exam data present  Exercise: 7 times/week.  Current exercise activities include: aerobics, bicycling outdoors and walking    Health Risk Assessment:  The patient has completed a Health Risk Assessment. This has been reviewed with them and has been scanned into the patient's chart.    Current Medical  Providers:  Patient Care Team:  Johnathon Allan MD as PCP - General (Internal Medicine)    The [facility] providers who are involved in the care of this patient are listed above. Additional providers and suppliers are listed below:  Dr Naveen Cho.        Recent Hospitalizations:  No hospitalization(s) within the last year..    Recent Lab Results:  CMP:  Lab Results   Component Value Date    BUN 15 02/24/2022    CREATININE 1.33 (H) 02/24/2022    EGFRIFNONA 53 (L) 02/24/2022    EGFRIFAFRI 64 02/24/2022    BCR 11.3 02/24/2022     02/24/2022    K 5.8 (H) 02/24/2022    CO2 27.6 02/24/2022    CALCIUM 10.1 02/24/2022    PROTENTOTREF 7.1 12/01/2021    ALBUMIN 4.70 12/01/2021    LABGLOBREF 2.4 12/01/2021    LABIL2 2.0 12/01/2021    BILITOT 0.5 12/01/2021    ALKPHOS 44 12/01/2021    AST 23 12/01/2021    ALT 27 12/01/2021       LIPID PANEL:  Lab Results   Component Value Date    CHLPL 113 12/01/2021    TRIG 180 (H) 12/01/2021    HDL 40 12/01/2021    VLDL 30 12/01/2021    LDL 43 12/01/2021       HbA1c:  No results found for: HGBA1C    URINE MICROALBUMIN:  No results found for: MICROALBUR, POCMALB    PSA:  Lab Results   Component Value Date    PSA 1.500 11/11/2020       Age-appropriate Screening Schedule:  Refer to the list below for future screening recommendations based on patient's age, sex and/or medical conditions. Orders for these recommended tests are listed in the plan section. The patient has been provided with a written plan.    Health Maintenance   Topic Date Due   • ZOSTER VACCINE (1 of 2) Never done   • TDAP/TD VACCINES (4 - Td or Tdap) 12/07/2021   • LIPID PANEL  12/01/2022   • INFLUENZA VACCINE  Completed       Depression Screen:   PHQ-2/PHQ-9 Depression Screening 3/3/2022   Little interest or pleasure in doing things 0   Feeling down, depressed, or hopeless 0   Trouble falling or staying asleep, or sleeping too much -   Feeling tired or having little energy -   Poor appetite or overeating -    Feeling bad about yourself - or that you are a failure or have let yourself or your family down -   Trouble concentrating on things, such as reading the newspaper or watching television -   Moving or speaking so slowly that other people could have noticed. Or the opposite - being so fidgety or restless that you have been moving around a lot more than usual -   Thoughts that you would be better off dead, or of hurting yourself in some way -   Total Score 0         Functional and Cognitive Screening:  Functional & Cognitive Status 3/3/2022   Do you have difficulty preparing food and eating? No   Do you have difficulty bathing yourself, getting dressed or grooming yourself? No   Do you have difficulty using the toilet? No   Do you have difficulty moving around from place to place? No   Do you have trouble with steps or getting out of a bed or a chair? No   Current Diet Well Balanced Diet   Dental Exam Up to date   Eye Exam Up to date   Exercise (times per week) 7 times per week   Current Exercises Include Walking   Current Exercise Activities Include -   Do you need help using the phone?  No   Are you deaf or do you have serious difficulty hearing?  No   Do you need help with transportation? No   Do you need help shopping? No   Do you need help preparing meals?  -   Do you need help with housework?  No   Do you need help with laundry? No   Do you need help taking your medications? No   Do you need help managing money? No   Do you ever drive or ride in a car without wearing a seat belt? No   Have you felt unusual stress, anger or loneliness in the last month? No   Who do you live with? Spouse   If you need help, do you have trouble finding someone available to you? No   Have you been bothered in the last four weeks by sexual problems? No   Do you have difficulty concentrating, remembering or making decisions? No       Does the patient have evidence of cognitive impairment? No    Advanced Care Planning:  ACP discussion  "was held with the patient during this visit. Patient does not have an advance directive, declines further assistance.    Identification of Risk Factors:  Risk factors include: Advance Directive Discussion  Fall Risk  Obesity/Overweight   Polypharmacy.    Review of Systems    Compared to one year ago, the patient feels his physical health is the same.  Compared to one year ago, the patient feels his mental health is the same.    Objective     Physical Exam    Vitals:    03/03/22 0901   BP: 128/82   Pulse: 83   Resp: 16   Temp: 98 °F (36.7 °C)   SpO2: 99%   Weight: 98 kg (216 lb)   Height: 180.3 cm (70.98\")   PainSc: 0-No pain       Body mass index is 30.14 kg/m².  Discussed the patient's BMI with him. The BMI is in the acceptable range.    Assessment/Plan   Patient Self-Management and Personalized Health Advice  The patient has been provided with information about: diet, exercise and weight management and preventive services including:   · Annual Wellness Visit (AWV).    Visit Diagnoses:    ICD-10-CM ICD-9-CM   1. Gastroesophageal reflux disease with esophagitis without hemorrhage  K21.00 530.81     530.10   2. Chronic renal impairment, stage 3 (moderate), unspecified whether stage 3a or 3b CKD (HCC)  N18.30 585.3   3. Cardiac arrhythmia, unspecified cardiac arrhythmia type  I49.9 427.9   4. Essential hypertension  I10 401.9       Orders Placed This Encounter   Procedures   • Basic metabolic panel     Order Specific Question:   Release to patient     Answer:   Immediate       Outpatient Encounter Medications as of 3/3/2022   Medication Sig Dispense Refill   • albuterol 108 (90 Base) MCG/ACT inhaler Inhale 2 puffs Every 4 (Four) Hours As Needed for Wheezing.     • Alirocumab (Praluent) 150 MG/ML injection pen Inject 1 mL under the skin into the appropriate area as directed Every 14 (Fourteen) Days. 6 pen 3   • Breo Ellipta 100-25 MCG/INH inhaler      • Cyanocobalamin 2500 MCG sublingual tablet Daily 90 each 3   • " desloratadine (CLARINEX) 5 MG tablet Take 5 mg by mouth Daily.     • lisinopril (PRINIVIL,ZESTRIL) 20 MG tablet Take 1 tablet by mouth Daily. 90 tablet 3   • zafirlukast (ACCOLATE) 20 MG tablet Take 1 tablet by mouth 2 (Two) Times a Day. 60 tablet 11   • [DISCONTINUED] lisinopril (PRINIVIL,ZESTRIL) 10 MG tablet Take 1 tablet by mouth Daily. 90 tablet 3   • [DISCONTINUED] losartan (Cozaar) 50 MG tablet Take 1 tablet by mouth Daily. 90 tablet 3   • famotidine (Pepcid) 20 MG tablet Take 1 tablet by mouth 2 (Two) Times a Day As Needed for Heartburn. 180 tablet 3   • [DISCONTINUED] amLODIPine (NORVASC) 5 MG tablet Take 1 tablet by mouth Daily. 90 tablet 3     No facility-administered encounter medications on file as of 3/3/2022.       Reviewed use of high risk medication in the elderly: yes  Reviewed for potential of harmful drug interactions in the elderly: yes    Follow Up:  Return in about 6 weeks (around 4/14/2022).     An After Visit Summary and PPPS with all of these plans were given to the patient.             Diagnoses and all orders for this visit:    1. Gastroesophageal reflux disease with esophagitis without hemorrhage (Primary)    2. Chronic renal impairment, stage 3 (moderate), unspecified whether stage 3a or 3b CKD (HCC)  -     Basic metabolic panel    3. Cardiac arrhythmia, unspecified cardiac arrhythmia type  -     lisinopril (PRINIVIL,ZESTRIL) 20 MG tablet; Take 1 tablet by mouth Daily.  Dispense: 90 tablet; Refill: 3    4. Essential hypertension  -     lisinopril (PRINIVIL,ZESTRIL) 20 MG tablet; Take 1 tablet by mouth Daily.  Dispense: 90 tablet; Refill: 3    Other orders  -     famotidine (Pepcid) 20 MG tablet; Take 1 tablet by mouth 2 (Two) Times a Day As Needed for Heartburn.  Dispense: 180 tablet; Refill: 3

## 2022-03-03 NOTE — PROGRESS NOTES
"Subjective     Patient ID: Mohsen Bucio is a 72 y.o. male. Patient is here for management of multiple medical problems.     Chief Complaint   Patient presents with   • Hyperlipidemia     History of Present Illness     hyperlip    Asthma      Back on ppi and renal issues got worse.        The following portions of the patient's history were reviewed and updated as appropriate: allergies, current medications, past family history, past medical history, past social history, past surgical history and problem list.    Review of Systems   Constitutional: Negative for fatigue and fever.   Psychiatric/Behavioral: Negative for self-injury and sleep disturbance. The patient is not nervous/anxious.    All other systems reviewed and are negative.      Current Outpatient Medications:   •  albuterol 108 (90 Base) MCG/ACT inhaler, Inhale 2 puffs Every 4 (Four) Hours As Needed for Wheezing., Disp: , Rfl:   •  Alirocumab (Praluent) 150 MG/ML injection pen, Inject 1 mL under the skin into the appropriate area as directed Every 14 (Fourteen) Days., Disp: 6 pen, Rfl: 3  •  Breo Ellipta 100-25 MCG/INH inhaler, , Disp: , Rfl:   •  Cyanocobalamin 2500 MCG sublingual tablet, Daily, Disp: 90 each, Rfl: 3  •  desloratadine (CLARINEX) 5 MG tablet, Take 5 mg by mouth Daily., Disp: , Rfl:   •  lisinopril (PRINIVIL,ZESTRIL) 20 MG tablet, Take 1 tablet by mouth Daily., Disp: 90 tablet, Rfl: 3  •  zafirlukast (ACCOLATE) 20 MG tablet, Take 1 tablet by mouth 2 (Two) Times a Day., Disp: 60 tablet, Rfl: 11  •  famotidine (Pepcid) 20 MG tablet, Take 1 tablet by mouth 2 (Two) Times a Day As Needed for Heartburn., Disp: 180 tablet, Rfl: 3    Objective      Blood pressure 128/82, pulse 83, temperature 98 °F (36.7 °C), resp. rate 16, height 180.3 cm (70.98\"), weight 98 kg (216 lb), SpO2 99 %.    Physical Exam     General Appearance:    Alert, cooperative, no distress, appears stated age   Head:    Normocephalic, without obvious abnormality, atraumatic "   Eyes:    PERRL, conjunctiva/corneas clear, EOM's intact   Ears:    Normal TM's and external ear canals, both ears   Nose:   Nares normal, septum midline, mucosa normal, no drainage   or sinus tenderness   Throat:   Lips, mucosa, and tongue normal; teeth and gums normal   Neck:   Supple, symmetrical, trachea midline, no adenopathy;        thyroid:  No enlargement/tenderness/nodules; no carotid    bruit or JVD   Back:     Symmetric, no curvature, ROM normal, no CVA tenderness   Lungs:     Clear to auscultation bilaterally, respirations unlabored   Chest wall:    No tenderness or deformity   Heart:    Regular rate and rhythm, S1 and S2 normal, no murmur,        rub or gallop   Abdomen:     Soft, non-tender, bowel sounds active all four quadrants,     no masses, no organomegaly   Extremities:   Extremities normal, atraumatic, no cyanosis or edema   Pulses:   2+ and symmetric all extremities   Skin:   Skin color, texture, turgor normal, no rashes or lesions   Lymph nodes:   Cervical, supraclavicular, and axillary nodes normal   Neurologic:   CNII-XII intact. Normal strength, sensation and reflexes       throughout      Results for orders placed or performed in visit on 02/23/22   Basic metabolic panel    Specimen: Blood   Result Value Ref Range    Glucose 110 (H) 65 - 99 mg/dL    BUN 15 8 - 23 mg/dL    Creatinine 1.33 (H) 0.76 - 1.27 mg/dL    eGFR Non African Am 53 (L) >60 mL/min/1.73    eGFR African Am 64 >60 mL/min/1.73    BUN/Creatinine Ratio 11.3 7.0 - 25.0    Sodium 137 136 - 145 mmol/L    Potassium 5.8 (H) 3.5 - 5.2 mmol/L    Chloride 101 98 - 107 mmol/L    Total CO2 27.6 22.0 - 29.0 mmol/L    Calcium 10.1 8.6 - 10.5 mg/dL         Assessment/Plan       Diagnoses and all orders for this visit:    1. Gastroesophageal reflux disease with esophagitis without hemorrhage (Primary)    2. Chronic renal impairment, stage 3 (moderate), unspecified whether stage 3a or 3b CKD (HCC)  -     Basic metabolic panel    3. Cardiac  arrhythmia, unspecified cardiac arrhythmia type  -     lisinopril (PRINIVIL,ZESTRIL) 20 MG tablet; Take 1 tablet by mouth Daily.  Dispense: 90 tablet; Refill: 3    4. Essential hypertension  -     lisinopril (PRINIVIL,ZESTRIL) 20 MG tablet; Take 1 tablet by mouth Daily.  Dispense: 90 tablet; Refill: 3    Other orders  -     famotidine (Pepcid) 20 MG tablet; Take 1 tablet by mouth 2 (Two) Times a Day As Needed for Heartburn.  Dispense: 180 tablet; Refill: 3      Return in about 6 weeks (around 4/14/2022).          There are no Patient Instructions on file for this visit.     Johnathon Allan MD    Assessment/Plan

## 2022-04-05 LAB
BUN SERPL-MCNC: 17 MG/DL (ref 8–23)
BUN/CREAT SERPL: 14.7 (ref 7–25)
CALCIUM SERPL-MCNC: 9.9 MG/DL (ref 8.6–10.5)
CHLORIDE SERPL-SCNC: 100 MMOL/L (ref 98–107)
CO2 SERPL-SCNC: 27.5 MMOL/L (ref 22–29)
CREAT SERPL-MCNC: 1.16 MG/DL (ref 0.76–1.27)
EGFRCR SERPLBLD CKD-EPI 2021: 67.8 ML/MIN/1.73
GLUCOSE SERPL-MCNC: 90 MG/DL (ref 65–99)
POTASSIUM SERPL-SCNC: 5.3 MMOL/L (ref 3.5–5.2)
SODIUM SERPL-SCNC: 139 MMOL/L (ref 136–145)

## 2022-04-15 ENCOUNTER — OFFICE VISIT (OUTPATIENT)
Dept: INTERNAL MEDICINE | Facility: CLINIC | Age: 72
End: 2022-04-15

## 2022-04-15 VITALS
WEIGHT: 216.12 LBS | RESPIRATION RATE: 16 BRPM | BODY MASS INDEX: 30.26 KG/M2 | HEIGHT: 71 IN | HEART RATE: 63 BPM | TEMPERATURE: 97.3 F | SYSTOLIC BLOOD PRESSURE: 126 MMHG | DIASTOLIC BLOOD PRESSURE: 80 MMHG | OXYGEN SATURATION: 97 %

## 2022-04-15 DIAGNOSIS — G47.33 OSA ON CPAP: ICD-10-CM

## 2022-04-15 DIAGNOSIS — E05.90 HYPERTHYROIDISM: ICD-10-CM

## 2022-04-15 DIAGNOSIS — Z99.89 OSA ON CPAP: ICD-10-CM

## 2022-04-15 DIAGNOSIS — E78.00 HYPERCHOLESTEROLEMIA: ICD-10-CM

## 2022-04-15 DIAGNOSIS — Z12.5 PROSTATE CANCER SCREENING: ICD-10-CM

## 2022-04-15 DIAGNOSIS — N18.1 CRI (CHRONIC RENAL INSUFFICIENCY), STAGE 1: Primary | ICD-10-CM

## 2022-04-15 PROCEDURE — 99214 OFFICE O/P EST MOD 30 MIN: CPT | Performed by: INTERNAL MEDICINE

## 2022-04-15 NOTE — PROGRESS NOTES
"Subjective     Patient ID: Mohsen Bucio is a 72 y.o. male. Patient is here for management of multiple medical problems.     Chief Complaint   Patient presents with   • Heartburn     6 week follow up     History of Present Illness   gerd changed ppi to h2.  Due to renal issues. Tolerating change      The following portions of the patient's history were reviewed and updated as appropriate: allergies, current medications, past family history, past medical history, past social history, past surgical history and problem list.    Review of Systems    Current Outpatient Medications:   •  albuterol 108 (90 Base) MCG/ACT inhaler, Inhale 2 puffs Every 4 (Four) Hours As Needed for Wheezing., Disp: , Rfl:   •  Alirocumab (Praluent) 150 MG/ML injection pen, Inject 1 mL under the skin into the appropriate area as directed Every 14 (Fourteen) Days., Disp: 6 pen, Rfl: 3  •  Breo Ellipta 100-25 MCG/INH inhaler, , Disp: , Rfl:   •  Cyanocobalamin 2500 MCG sublingual tablet, Daily, Disp: 90 each, Rfl: 3  •  desloratadine (CLARINEX) 5 MG tablet, Take 5 mg by mouth Daily., Disp: , Rfl:   •  famotidine (Pepcid) 20 MG tablet, Take 1 tablet by mouth 2 (Two) Times a Day As Needed for Heartburn., Disp: 180 tablet, Rfl: 3  •  lisinopril (PRINIVIL,ZESTRIL) 20 MG tablet, Take 1 tablet by mouth Daily., Disp: 90 tablet, Rfl: 3  •  zafirlukast (ACCOLATE) 20 MG tablet, Take 1 tablet by mouth 2 (Two) Times a Day., Disp: 60 tablet, Rfl: 11    Objective      Blood pressure 126/80, pulse 63, temperature 97.3 °F (36.3 °C), temperature source Temporal, resp. rate 16, height 180.3 cm (70.98\"), weight 98 kg (216 lb 1.9 oz), SpO2 97 %.    Physical Exam     General Appearance:    Alert, cooperative, no distress, appears stated age   Head:    Normocephalic, without obvious abnormality, atraumatic   Eyes:    PERRL, conjunctiva/corneas clear, EOM's intact   Ears:    Normal TM's and external ear canals, both ears   Nose:   Nares normal, septum midline, mucosa " normal, no drainage   or sinus tenderness   Throat:   Lips, mucosa, and tongue normal; teeth and gums normal   Neck:   Supple, symmetrical, trachea midline, no adenopathy;        thyroid:  No enlargement/tenderness/nodules; no carotid    bruit or JVD   Back:     Symmetric, no curvature, ROM normal, no CVA tenderness   Lungs:     Clear to auscultation bilaterally, respirations unlabored   Chest wall:    No tenderness or deformity   Heart:    Regular rate and rhythm, S1 and S2 normal, no murmur,        rub or gallop   Abdomen:     Soft, non-tender, bowel sounds active all four quadrants,     no masses, no organomegaly   Extremities:   Extremities normal, atraumatic, no cyanosis or edema   Pulses:   2+ and symmetric all extremities   Skin:   Skin color, texture, turgor normal, no rashes or lesions   Lymph nodes:   Cervical, supraclavicular, and axillary nodes normal   Neurologic:   CNII-XII intact. Normal strength, sensation and reflexes       throughout      Results for orders placed or performed in visit on 03/03/22   Basic metabolic panel    Specimen: Blood   Result Value Ref Range    Glucose 90 65 - 99 mg/dL    BUN 17 8 - 23 mg/dL    Creatinine 1.16 0.76 - 1.27 mg/dL    EGFR Result 67.8 >60.0 mL/min/1.73    BUN/Creatinine Ratio 14.7 7.0 - 25.0    Sodium 139 136 - 145 mmol/L    Potassium 5.3 (H) 3.5 - 5.2 mmol/L    Chloride 100 98 - 107 mmol/L    Total CO2 27.5 22.0 - 29.0 mmol/L    Calcium 9.9 8.6 - 10.5 mg/dL         Assessment/Plan     gerd changed ppi to h2.  Due to renal issues. Tolerating change.      denia now treated and doing noticeably better.    Diagnoses and all orders for this visit:    1. CRI (chronic renal insufficiency), stage 1 (Primary)  -     Lipid Panel  -     PSA Screen  -     CBC & Differential  -     Vitamin B12  -     Comprehensive Metabolic Panel  -     TSH    2. Hyperthyroidism  -     Lipid Panel  -     PSA Screen  -     CBC & Differential  -     Vitamin B12  -     Comprehensive Metabolic  Panel  -     TSH    3. Hypercholesterolemia  -     Lipid Panel  -     PSA Screen  -     CBC & Differential  -     Vitamin B12  -     Comprehensive Metabolic Panel  -     TSH    4. Prostate cancer screening  -     PSA Screen    5. ANN on CPAP      Return in about 6 months (around 10/15/2022).          There are no Patient Instructions on file for this visit.     Johnathon Allan MD    Assessment/Plan       Answers for HPI/ROS submitted by the patient on 4/8/2022  What is the primary reason for your visit?: Other  Please describe your symptoms.: Followup for “kidney disease”, review bloodwork  Have you had these symptoms before?: Yes  How long have you been having these symptoms?: Greater than 2 weeks

## 2022-05-10 ENCOUNTER — OFFICE VISIT (OUTPATIENT)
Dept: NEUROLOGY | Facility: CLINIC | Age: 72
End: 2022-05-10

## 2022-05-10 VITALS
BODY MASS INDEX: 30.13 KG/M2 | SYSTOLIC BLOOD PRESSURE: 120 MMHG | TEMPERATURE: 98 F | HEIGHT: 71 IN | DIASTOLIC BLOOD PRESSURE: 72 MMHG | HEART RATE: 107 BPM | WEIGHT: 215.2 LBS | OXYGEN SATURATION: 96 %

## 2022-05-10 DIAGNOSIS — G47.33 OBSTRUCTIVE SLEEP APNEA: Primary | ICD-10-CM

## 2022-05-10 PROCEDURE — 99213 OFFICE O/P EST LOW 20 MIN: CPT | Performed by: NURSE PRACTITIONER

## 2022-05-10 NOTE — PROGRESS NOTES
Follow Up Office Visit      Patient Name: Mohsen Bucio  : 1950   MRN: [id number]     Chief Complaint:    Chief Complaint   Patient presents with   • Follow-up     Patient In office to follow up on denia.          History of Present Illness: Mohsen Bucio is a 72 y.o. male who is here today to follow up with DENIA and was seen for a sleep consult on 10/12/2021.  Currently on AutoPap 6/16cm, mean pressure 6.2, therapy and tolerating pressures well, compliance >4 hours 86.7%, AHI 0.2/hour.  He is tolerating his pressures well.  He has had symptomatic improvement since starting AutoPap therapy.  He feels much better now.  He is using [employer]; using a full face mask; regular tubing.    *Home sleep study on 2021 showed moderate DENIA with an AHI of 21/hour.     Following taken from previous visit note:  Mohsen Bucio is a 71 y.o. male who is here today to establish care with Sleep Medicine.  He complains of experiencing restless sleep.  He wakes up during the night and can not go back to sleep.  Sleep questionnaire reviewed.  It takes him 15-20 minutes to fall asleep initially, he wakes up once during the night, he sleeps about 6 hours per night, he feels rested upon awakening, he feels better with more sleep, he snores at times, he some times wakes up with a dry mouth, he has nightmares on some nights, he has vivid dream-like mental images upon awakening or going to sleep at times, he has heartburn that interferes with sleep, he denies excessive daytime sleepiness.  He denies acting out his dreams during sleep.  Additional risk factors- BMI 29, HTN, dyslipidemia.     Subjective      Review of Systems:   Review of Systems   Constitutional: Negative for chills, fatigue and fever.   HENT: Negative for facial swelling, hearing loss, sore throat, tinnitus and trouble swallowing.    Eyes: Negative for blurred vision, double vision, photophobia and visual disturbance.   Respiratory: Positive for apnea.  Negative for cough, chest tightness and shortness of breath.    Cardiovascular: Negative for chest pain, palpitations and leg swelling.   Gastrointestinal: Negative for abdominal pain, nausea and vomiting.   Endocrine: Negative for cold intolerance and heat intolerance.   Musculoskeletal: Negative for gait problem, neck pain and neck stiffness.   Skin: Negative for color change and rash.   Allergic/Immunologic: Negative for environmental allergies and food allergies.   Neurological: Negative for dizziness, syncope, speech difficulty, weakness, light-headedness, numbness, headache and memory problem.   Psychiatric/Behavioral: Negative for behavioral problems, sleep disturbance and depressed mood. The patient is not nervous/anxious.        I have reviewed and the following portions of the patient's history were updated as appropriate: past family history, past medical history, past social history, past surgical history and problem list.    Medications:     Current Outpatient Medications:   •  albuterol 108 (90 Base) MCG/ACT inhaler, Inhale 2 puffs Every 4 (Four) Hours As Needed for Wheezing., Disp: , Rfl:   •  Alirocumab (Praluent) 150 MG/ML injection pen, Inject 1 mL under the skin into the appropriate area as directed Every 14 (Fourteen) Days., Disp: 6 pen, Rfl: 3  •  Breo Ellipta 100-25 MCG/INH inhaler, , Disp: , Rfl:   •  Cyanocobalamin 2500 MCG sublingual tablet, Daily, Disp: 90 each, Rfl: 3  •  desloratadine (CLARINEX) 5 MG tablet, Take 5 mg by mouth Daily., Disp: , Rfl:   •  famotidine (Pepcid) 20 MG tablet, Take 1 tablet by mouth 2 (Two) Times a Day As Needed for Heartburn., Disp: 180 tablet, Rfl: 3  •  lisinopril (PRINIVIL,ZESTRIL) 20 MG tablet, Take 1 tablet by mouth Daily., Disp: 90 tablet, Rfl: 3  •  zafirlukast (ACCOLATE) 20 MG tablet, Take 1 tablet by mouth 2 (Two) Times a Day., Disp: 60 tablet, Rfl: 11    Allergies:   Allergies   Allergen Reactions   • Atorvastatin Other (See Comments)     Muscle cramps  "  • Pitavastatin Other (See Comments)     Muscle cramps   • Pravastatin Other (See Comments)     Muscle cramps   • Rosuvastatin Other (See Comments)     Muscle cramps   • Simvastatin Other (See Comments)     Muscle cramps   • Zetia [Ezetimibe] Myalgia   • Penicillins Other (See Comments)     Allergy testing years ago showed patient allergic       Objective     Physical Exam:  Vital Signs:   Vitals:    05/10/22 1412   BP: 120/72   BP Location: Right arm   Patient Position: Sitting   Cuff Size: Adult   Pulse: 107   Temp: 98 °F (36.7 °C)   SpO2: 96%   Weight: 97.6 kg (215 lb 3.2 oz)   Height: 180.3 cm (70.98\")   PainSc: 0-No pain     Body mass index is 30.03 kg/m².    Physical Exam  Vitals and nursing note reviewed.   Constitutional:       General: He is not in acute distress.     Appearance: Normal appearance. He is well-developed. He is not diaphoretic.   HENT:      Head: Normocephalic and atraumatic.   Eyes:      Extraocular Movements: Extraocular movements intact.      Conjunctiva/sclera: Conjunctivae normal.   Neck:      Trachea: Trachea normal.   Pulmonary:      Effort: Pulmonary effort is normal. No respiratory distress.   Musculoskeletal:         General: Normal range of motion.   Skin:     General: Skin is warm and dry.      Findings: No rash.   Neurological:      Mental Status: He is alert and oriented to person, place, and time.   Psychiatric:         Mood and Affect: Mood normal.         Behavior: Behavior normal.         Thought Content: Thought content normal.         Judgment: Judgment normal.         Neurologic Exam     Mental Status   Oriented to person, place, and time.        Assessment / Plan      Assessment/Plan:   Diagnoses and all orders for this visit:    1. Obstructive sleep apnea (Primary)    2. BMI 30.0-30.9,adult    *[place] [employer] [facility] [place]   *Current compliance report reviewed and scanned into patient's EMR.   *Advised patient to avoid driving if drowsy.     Follow Up: "   Return in about 1 year (around 5/10/2023) for F/U Obstructive Sleep Apnea.    LUCITA Callejas, FNP-C  [facility] Neurology and Sleep Medicine       Please note that portions of this note may have been completed with a voice recognition program. Efforts were made to edit the dictations, but occasionally words are mistranscribed.

## 2022-09-02 ENCOUNTER — OFFICE VISIT (OUTPATIENT)
Dept: CARDIOLOGY | Facility: CLINIC | Age: 72
End: 2022-09-02

## 2022-09-02 VITALS
HEART RATE: 72 BPM | BODY MASS INDEX: 31.5 KG/M2 | RESPIRATION RATE: 16 BRPM | OXYGEN SATURATION: 97 % | HEIGHT: 70 IN | SYSTOLIC BLOOD PRESSURE: 124 MMHG | WEIGHT: 220 LBS | DIASTOLIC BLOOD PRESSURE: 72 MMHG

## 2022-09-02 DIAGNOSIS — I49.9 CARDIAC ARRHYTHMIA, UNSPECIFIED CARDIAC ARRHYTHMIA TYPE: ICD-10-CM

## 2022-09-02 DIAGNOSIS — I10 ESSENTIAL HYPERTENSION: ICD-10-CM

## 2022-09-02 DIAGNOSIS — N18.2 CKD (CHRONIC KIDNEY DISEASE) STAGE 2, GFR 60-89 ML/MIN: Primary | ICD-10-CM

## 2022-09-02 PROCEDURE — 99213 OFFICE O/P EST LOW 20 MIN: CPT | Performed by: INTERNAL MEDICINE

## 2022-09-02 RX ORDER — LISINOPRIL 20 MG/1
20 TABLET ORAL DAILY
Qty: 90 TABLET | Refills: 3 | Status: SHIPPED | OUTPATIENT
Start: 2022-09-02 | End: 2023-01-17 | Stop reason: ALTCHOICE

## 2022-09-02 NOTE — PROGRESS NOTES
[facility] Cardiology Office Follow Up Note    Mohsen Bucio  [id number]  2022    Primary Care Provider: Johnathon Allan MD    Chief Complaint: Routine follow-up    History of Present Illness:   Mr. Mohsen Bucio is a 72y.o. male who presents to the Cardiology Clinic for routine follow-up. The patient has a past medical history significant for hypertension, asthma, chronic kidney disease, and Graves' disease.  He does not have any significant past cardiac history.  He initially presented the cardiology clinic for evaluation of occasional palpitations.  A subsequent 14-day outpatient cardiac monitor in  showed occasional supraventricular and ventricular ectopy, which was asymptomatic.    He returns today for follow-up.  Since last appointment, the patient reports he has been well with no significant changes in his health.  He remains active, walking on a regular basis for exercise.  He denies any exertional chest pain or chest discomfort.  No significant exertional dyspnea.  He reports his episodes of palpitations are now minimal, and do not interrupt daily activities.  No sustained episodes.  No presyncopal or syncopal events.  No specific complaints today.    Past Cardiac Testin. Last Coronary Angio: None  2. Prior Stress Testin, reportedly unremarkable  3. Last Echo: None  4. Prior Holter Monitor:  14-day Holter               1.  The predominant rhythm is sinus rhythm with an average heart rate 72 bpm.              2.  Normal atrioventricular conduction.              3.  No sustained arrhythmias.              4.  Occasional supraventricular ectopy with limited runs of SVT with the longest being 6 beats in duration.  [place] 1.4%.              5.  Occasional ventricular ectopy with limited runs of NSVT with the longest being 5 beats in duration.  [place] 1.3%.              6.  Two symptomatic events corresponding to NSR.  No correlation of symptoms to  ectopy.    Review of Systems:   Review of Systems   Constitutional: Negative for activity change, appetite change, chills, diaphoresis, fatigue, fever, unexpected weight gain and unexpected weight loss.   Eyes: Negative for blurred vision and double vision.   Respiratory: Negative for cough, chest tightness, shortness of breath and wheezing.    Cardiovascular: Positive for palpitations. Negative for chest pain and leg swelling.   Gastrointestinal: Negative for abdominal pain, anal bleeding, blood in stool and GERD.   Endocrine: Negative for cold intolerance and heat intolerance.   Genitourinary: Negative for hematuria.   Neurological: Negative for dizziness, syncope, weakness and light-headedness.   Hematological: Does not bruise/bleed easily.   Psychiatric/Behavioral: Negative for depressed mood and stress. The patient is not nervous/anxious.        I have reviewed and/or updated the patient's past medical, past surgical, family, social history, problem list and allergies as appropriate.     Medications:     Current Outpatient Medications:   •  albuterol 108 (90 Base) MCG/ACT inhaler, Inhale 2 puffs Every 4 (Four) Hours As Needed for Wheezing., Disp: , Rfl:   •  Alirocumab (Praluent) 150 MG/ML injection pen, Inject 1 mL under the skin into the appropriate area as directed Every 14 (Fourteen) Days., Disp: 6 pen, Rfl: 3  •  Breo Ellipta 100-25 MCG/INH inhaler, Daily., Disp: , Rfl:   •  Cyanocobalamin 2500 MCG sublingual tablet, Daily, Disp: 90 each, Rfl: 3  •  desloratadine (CLARINEX) 5 MG tablet, Take 5 mg by mouth Daily As Needed., Disp: , Rfl:   •  famotidine (Pepcid) 20 MG tablet, Take 1 tablet by mouth 2 (Two) Times a Day As Needed for Heartburn., Disp: 180 tablet, Rfl: 3  •  lisinopril (PRINIVIL,ZESTRIL) 20 MG tablet, Take 1 tablet by mouth Daily., Disp: 90 tablet, Rfl: 3    Physical Exam:  Vital Signs:   Vitals:    09/02/22 0904   BP: 124/72   BP Location: Right arm   Patient Position: Sitting   Pulse: 72  "  Resp: 16   SpO2: 97%   Weight: 99.8 kg (220 lb)   Height: 177.8 cm (70\")       Physical Exam  Constitutional:       General: He is not in acute distress.     Appearance: Normal appearance. He is not diaphoretic.   HENT:      Head: Normocephalic and atraumatic.   Cardiovascular:      Rate and Rhythm: Normal rate and regular rhythm.      Heart sounds: No murmur heard.  Pulmonary:      Effort: Pulmonary effort is normal. No respiratory distress.      Breath sounds: Normal breath sounds. No stridor. No wheezing, rhonchi or rales.   Abdominal:      General: Bowel sounds are normal. There is no distension.      Palpations: Abdomen is soft.      Tenderness: There is no abdominal tenderness. There is no guarding or rebound.   Musculoskeletal:         General: No swelling. Normal range of motion.      Cervical back: Neck supple. No tenderness.   Skin:     General: Skin is warm and dry.   Neurological:      General: No focal deficit present.      Mental Status: He is alert and oriented to person, place, and time.   Psychiatric:         Mood and Affect: Mood normal.         Behavior: Behavior normal.         Results Review:   I reviewed the patient's new clinical results.      Assessment / Plan:     1. Palpitations  -- Palpitations now rare, do not inhibit daily activities  --14-day outpatient cardiac monitoring in 9/21 showed rare supraventricular and ventricular ectopy, which was asymptomatic  -- Last TSH within normal limits  --Given unremarkable cardiac monitor and palpitations now improved, no indication for further work-up at this time  --Follow-up in 1 year, consider as needed thereafter     2. Essential hypertension  -- BP remains well controlled with lisinopril     3.  Chronic kidney disease, stage II  -- Last labs in 4/22 showed GFR 67  --Stable         Follow Up:   Return in about 1 year (around 9/2/2023).      Thank you for allowing me to participate in the care of your patient. Please to not hesitate to contact " me with additional questions or concerns.     CARLOZ Hodge MD  Interventional Cardiology   09/02/2022  09:12 EDT

## 2022-10-11 LAB
ALBUMIN SERPL-MCNC: 4.6 G/DL (ref 3.5–5.2)
ALBUMIN/GLOB SERPL: 2.2 G/DL
ALP SERPL-CCNC: 43 U/L (ref 39–117)
ALT SERPL-CCNC: 26 U/L (ref 1–41)
AST SERPL-CCNC: 25 U/L (ref 1–40)
BASOPHILS # BLD AUTO: 0.06 10*3/MM3 (ref 0–0.2)
BASOPHILS NFR BLD AUTO: 1 % (ref 0–1.5)
BILIRUB SERPL-MCNC: 0.5 MG/DL (ref 0–1.2)
BUN SERPL-MCNC: 14 MG/DL (ref 8–23)
BUN/CREAT SERPL: 13.1 (ref 7–25)
CALCIUM SERPL-MCNC: 9.8 MG/DL (ref 8.6–10.5)
CHLORIDE SERPL-SCNC: 103 MMOL/L (ref 98–107)
CHOLEST SERPL-MCNC: 113 MG/DL (ref 0–200)
CO2 SERPL-SCNC: 28.8 MMOL/L (ref 22–29)
CREAT SERPL-MCNC: 1.07 MG/DL (ref 0.76–1.27)
EGFRCR SERPLBLD CKD-EPI 2021: 74.7 ML/MIN/1.73
EOSINOPHIL # BLD AUTO: 0.28 10*3/MM3 (ref 0–0.4)
EOSINOPHIL NFR BLD AUTO: 4.5 % (ref 0.3–6.2)
ERYTHROCYTE [DISTWIDTH] IN BLOOD BY AUTOMATED COUNT: 13.7 % (ref 12.3–15.4)
GLOBULIN SER CALC-MCNC: 2.1 GM/DL
GLUCOSE SERPL-MCNC: 93 MG/DL (ref 65–99)
HCT VFR BLD AUTO: 47.4 % (ref 37.5–51)
HDLC SERPL-MCNC: 41 MG/DL (ref 40–60)
HGB BLD-MCNC: 15.7 G/DL (ref 13–17.7)
IMM GRANULOCYTES # BLD AUTO: 0.03 10*3/MM3 (ref 0–0.05)
IMM GRANULOCYTES NFR BLD AUTO: 0.5 % (ref 0–0.5)
LDLC SERPL CALC-MCNC: 46 MG/DL (ref 0–100)
LYMPHOCYTES # BLD AUTO: 1.87 10*3/MM3 (ref 0.7–3.1)
LYMPHOCYTES NFR BLD AUTO: 29.9 % (ref 19.6–45.3)
MCH RBC QN AUTO: 29.8 PG (ref 26.6–33)
MCHC RBC AUTO-ENTMCNC: 33.1 G/DL (ref 31.5–35.7)
MCV RBC AUTO: 90.1 FL (ref 79–97)
MONOCYTES # BLD AUTO: 0.62 10*3/MM3 (ref 0.1–0.9)
MONOCYTES NFR BLD AUTO: 9.9 % (ref 5–12)
NEUTROPHILS # BLD AUTO: 3.39 10*3/MM3 (ref 1.7–7)
NEUTROPHILS NFR BLD AUTO: 54.2 % (ref 42.7–76)
NRBC BLD AUTO-RTO: 0 /100 WBC (ref 0–0.2)
PLATELET # BLD AUTO: 325 10*3/MM3 (ref 140–450)
POTASSIUM SERPL-SCNC: 5.3 MMOL/L (ref 3.5–5.2)
PROT SERPL-MCNC: 6.7 G/DL (ref 6–8.5)
PSA SERPL-MCNC: 1.74 NG/ML (ref 0–4)
RBC # BLD AUTO: 5.26 10*6/MM3 (ref 4.14–5.8)
SODIUM SERPL-SCNC: 140 MMOL/L (ref 136–145)
TRIGL SERPL-MCNC: 156 MG/DL (ref 0–150)
TSH SERPL DL<=0.005 MIU/L-ACNC: 0.35 UIU/ML (ref 0.27–4.2)
VIT B12 SERPL-MCNC: 645 PG/ML (ref 211–946)
VLDLC SERPL CALC-MCNC: 26 MG/DL (ref 5–40)
WBC # BLD AUTO: 6.25 10*3/MM3 (ref 3.4–10.8)

## 2022-10-14 ENCOUNTER — OFFICE VISIT (OUTPATIENT)
Dept: INTERNAL MEDICINE | Facility: CLINIC | Age: 72
End: 2022-10-14

## 2022-10-14 VITALS
SYSTOLIC BLOOD PRESSURE: 120 MMHG | HEIGHT: 70 IN | DIASTOLIC BLOOD PRESSURE: 82 MMHG | WEIGHT: 218 LBS | OXYGEN SATURATION: 99 % | BODY MASS INDEX: 31.21 KG/M2 | TEMPERATURE: 97.5 F | HEART RATE: 90 BPM

## 2022-10-14 DIAGNOSIS — E87.5 HYPERKALEMIA: ICD-10-CM

## 2022-10-14 DIAGNOSIS — I49.8 SINUS ARRHYTHMIA: ICD-10-CM

## 2022-10-14 DIAGNOSIS — R55 SYNCOPE, UNSPECIFIED SYNCOPE TYPE: Primary | ICD-10-CM

## 2022-10-14 DIAGNOSIS — I10 ESSENTIAL HYPERTENSION: ICD-10-CM

## 2022-10-14 LAB
BUN SERPL-MCNC: 14 MG/DL (ref 8–23)
BUN/CREAT SERPL: 11.9 (ref 7–25)
CALCIUM SERPL-MCNC: 10.3 MG/DL (ref 8.6–10.5)
CHLORIDE SERPL-SCNC: 103 MMOL/L (ref 98–107)
CO2 SERPL-SCNC: 27 MMOL/L (ref 22–29)
CREAT SERPL-MCNC: 1.18 MG/DL (ref 0.76–1.27)
EGFRCR SERPLBLD CKD-EPI 2021: 66.4 ML/MIN/1.73
GLUCOSE SERPL-MCNC: 123 MG/DL (ref 65–99)
MAGNESIUM SERPL-MCNC: 3.4 MG/DL (ref 1.6–2.4)
POTASSIUM SERPL-SCNC: 5.2 MMOL/L (ref 3.5–5.2)
SODIUM SERPL-SCNC: 142 MMOL/L (ref 136–145)

## 2022-10-14 PROCEDURE — 93005 ELECTROCARDIOGRAM TRACING: CPT | Performed by: NURSE PRACTITIONER

## 2022-10-14 PROCEDURE — 99214 OFFICE O/P EST MOD 30 MIN: CPT | Performed by: NURSE PRACTITIONER

## 2022-10-14 NOTE — PROGRESS NOTES
"     Office Visit      Patient Name: Mohsen Bucio  : 1950   MRN: [id number]     Chief Complaint:    Chief Complaint   Patient presents with   • Dizziness     Felt dizzy last night - passed out for a moment after walking to the kitchen after sitting down for a while        History of Present Illness: Mohsen Bucio is a 72 y.o. male who is here today after passing out last night after enjoying a bourbon.  He typically drinks one bourbon in the evening.  When he stood up he recalls feeling like the bourbon hit him \"awful hard.\"  Rested at his kitchen table, put his head down. Passed out, hit his chin on the table. Was aware/awake by the time when he hit the floor, recalls the pain of hitting his chin. Did not hit his head.  No palpitations, diaphoresis, vision changes, altered mental status, loss of consciousness, earache, nasal congestion, chest pain, shortness of breath, increased lower extremity swelling, nausea. Had COVID vaccine a week ago.    Follows Dr. Hodge, cardiology, after having palpitations last year.  Takes lisinopril 20 mg daily as prescribed for hypertension.  Does not monitor blood pressure at home.  Slightly elevated potassium, 5.3, on labs drawn 3 days ago. Typically has mild elevation in potassium when labs drawn.     Subjective      I have reviewed and the following portions of the patient's history were updated as appropriate: past family history, past medical history, past social history, past surgical history and problem list.      Current Outpatient Medications:   •  albuterol 108 (90 Base) MCG/ACT inhaler, Inhale 2 puffs Every 4 (Four) Hours As Needed for Wheezing., Disp: , Rfl:   •  Alirocumab (Praluent) 150 MG/ML injection pen, Inject 1 mL under the skin into the appropriate area as directed Every 14 (Fourteen) Days., Disp: 6 pen, Rfl: 3  •  Breo Ellipta 100-25 MCG/INH inhaler, Daily., Disp: , Rfl:   •  Cyanocobalamin 2500 MCG sublingual tablet, Daily, Disp: 90 each, Rfl: 3  •  " "desloratadine (CLARINEX) 5 MG tablet, Take 5 mg by mouth Daily As Needed., Disp: , Rfl:   •  famotidine (Pepcid) 20 MG tablet, Take 1 tablet by mouth 2 (Two) Times a Day As Needed for Heartburn., Disp: 180 tablet, Rfl: 3  •  lisinopril (PRINIVIL,ZESTRIL) 20 MG tablet, Take 1 tablet by mouth Daily., Disp: 90 tablet, Rfl: 3    Allergies   Allergen Reactions   • Atorvastatin Other (See Comments)     Muscle cramps   • Pitavastatin Other (See Comments)     Muscle cramps   • Pravastatin Other (See Comments)     Muscle cramps   • Rosuvastatin Other (See Comments)     Muscle cramps   • Simvastatin Other (See Comments)     Muscle cramps   • Zetia [Ezetimibe] Myalgia   • Penicillins Other (See Comments)     Allergy testing years ago showed patient allergic       Objective     Physical Exam:  Vital Signs:   Vitals:    10/14/22 1106   BP: 120/82   Pulse: 90   Temp: 97.5 °F (36.4 °C)   TempSrc: Infrared   SpO2: 99%   Weight: 98.9 kg (218 lb)   Height: 177.8 cm (70\")   PainSc: 0-No pain     Body mass index is 31.28 kg/m².    Physical Exam  Constitutional:       Appearance: He is not ill-appearing.   HENT:      Head: Normocephalic.      Right Ear: External ear normal.      Left Ear: External ear normal.   Eyes:      Extraocular Movements: Extraocular movements intact.      Conjunctiva/sclera: Conjunctivae normal.      Pupils: Pupils are equal, round, and reactive to light.   Cardiovascular:      Rate and Rhythm: Normal rate and regular rhythm.      Pulses:           Radial pulses are 2+ on the right side and 2+ on the left side.        Dorsalis pedis pulses are 2+ on the right side and 2+ on the left side.      Heart sounds: Normal heart sounds.   Pulmonary:      Effort: Pulmonary effort is normal.      Breath sounds: Normal breath sounds.   Musculoskeletal:      Cervical back: Normal range of motion and neck supple.   Skin:     General: Skin is warm.      Capillary Refill: Capillary refill takes less than 2 seconds.      " Comments: Abrasion left chin, tender to touch.  Split in right lower lip.    Neurological:      Mental Status: He is alert and oriented to person, place, and time.      Coordination: Coordination normal.      Gait: Gait normal.   Psychiatric:         Attention and Perception: Attention normal.         Mood and Affect: Mood and affect normal.         Speech: Speech normal.         Behavior: Behavior normal.           ECG 12 Lead    Date/Time: 10/14/2022 11:47 AM  Performed by: Ronel Hyatt APRN  Authorized by: Ronel Hyatt APRN   Comparison: compared with previous ECG from 8/24/2021  Rhythm: sinus rhythm and sinus arrhythmia  Rate: normal  BPM: 76    Clinical impression: abnormal EKG          Assessment / Plan      Assessment/Plan:   Diagnoses and all orders for this visit:    1. Syncope, unspecified syncope type (Primary)  -     ECG 12 Lead     2. Sinus arrhythmia    3. Essential hypertension        - Follow heart healthy diet.  Keep sodium intake < 1500 mg per day.  Avoid processed & fast foods.          - Exercise as tolerated, with a goal of 30 minutes of moderate exercise most days.         - Take medications as prescribed.    4. Hyperkalemia  -     Basic Metabolic Panel  -     Magnesium  - Stay well hydrated.        Follow Up:   Return if symptoms worsen or fail to improve.    Patient was given instructions and counseling regarding his condition or for health maintenance advice. Please see specific information pulled into the AVS if appropriate.       [facility] [address] [facility]     Please note that portions of this note may have been completed with a voice recognition program. Efforts were made to edit dictation, but occasionally words are mistranscribed.

## 2022-10-17 ENCOUNTER — OFFICE VISIT (OUTPATIENT)
Dept: INTERNAL MEDICINE | Facility: CLINIC | Age: 72
End: 2022-10-17

## 2022-10-17 VITALS
HEART RATE: 85 BPM | OXYGEN SATURATION: 99 % | SYSTOLIC BLOOD PRESSURE: 135 MMHG | TEMPERATURE: 98.5 F | HEIGHT: 70 IN | WEIGHT: 222 LBS | DIASTOLIC BLOOD PRESSURE: 84 MMHG | RESPIRATION RATE: 16 BRPM | BODY MASS INDEX: 31.78 KG/M2

## 2022-10-17 DIAGNOSIS — R55 SYNCOPE, UNSPECIFIED SYNCOPE TYPE: ICD-10-CM

## 2022-10-17 DIAGNOSIS — E83.41 HYPERMAGNESEMIA: Primary | ICD-10-CM

## 2022-10-17 DIAGNOSIS — I10 PRIMARY HYPERTENSION: Primary | ICD-10-CM

## 2022-10-17 PROCEDURE — 99214 OFFICE O/P EST MOD 30 MIN: CPT | Performed by: INTERNAL MEDICINE

## 2022-10-17 NOTE — PROGRESS NOTES
"Subjective     Patient ID: Mohsen Bucio is a 72 y.o. male. Patient is here for management of multiple medical problems.     Chief Complaint   Patient presents with   • Chronic Renal Failure     Stage 1   • Syncope     History of Present Illness     Syncope.  Thursday last week. Reach for table as he was not feeling right. dizzzy and spinning room.    Prior to spell he just got up fro sitting in the evening. Not out in heat and well hydrated.    Felt fine after.     See [facility]. Ekg.          The following portions of the patient's history were reviewed and updated as appropriate: allergies, current medications, past family history, past medical history, past social history, past surgical history and problem list.    Review of Systems    Current Outpatient Medications:   •  albuterol 108 (90 Base) MCG/ACT inhaler, Inhale 2 puffs Every 4 (Four) Hours As Needed for Wheezing., Disp: , Rfl:   •  Alirocumab (Praluent) 150 MG/ML injection pen, Inject 1 mL under the skin into the appropriate area as directed Every 14 (Fourteen) Days., Disp: 6 pen, Rfl: 3  •  Breo Ellipta 100-25 MCG/INH inhaler, Daily., Disp: , Rfl:   •  Cyanocobalamin 2500 MCG sublingual tablet, Daily, Disp: 90 each, Rfl: 3  •  desloratadine (CLARINEX) 5 MG tablet, Take 5 mg by mouth Daily As Needed., Disp: , Rfl:   •  famotidine (Pepcid) 20 MG tablet, Take 1 tablet by mouth 2 (Two) Times a Day As Needed for Heartburn., Disp: 180 tablet, Rfl: 3  •  lisinopril (PRINIVIL,ZESTRIL) 20 MG tablet, Take 1 tablet by mouth Daily., Disp: 90 tablet, Rfl: 3    Objective      Blood pressure 135/84, pulse 85, temperature 98.5 °F (36.9 °C), resp. rate 16, height 177.8 cm (70\"), weight 101 kg (222 lb), SpO2 99 %.    Physical Exam     General Appearance:    Alert, cooperative, no distress, appears stated age   Head:    Normocephalic, without obvious abnormality, atraumatic   Eyes:    PERRL, conjunctiva/corneas clear, EOM's intact   Ears:    Normal TM's and external " ear canals, both ears   Nose:   Nares normal, septum midline, mucosa normal, no drainage   or sinus tenderness   Throat:   Lips, mucosa, and tongue normal; teeth and gums normal   Neck:   Supple, symmetrical, trachea midline, no adenopathy;        thyroid:  No enlargement/tenderness/nodules; no carotid    bruit or JVD   Back:     Symmetric, no curvature, ROM normal, no CVA tenderness   Lungs:     Clear to auscultation bilaterally, respirations unlabored   Chest wall:    No tenderness or deformity   Heart:    Regular rate and rhythm, S1 and S2 normal, no murmur,        rub or gallop   Abdomen:     Soft, non-tender, bowel sounds active all four quadrants,     no masses, no organomegaly   Extremities:   Extremities normal, atraumatic, no cyanosis or edema   Pulses:   2+ and symmetric all extremities   Skin:   Skin color, texture, turgor normal, no rashes or lesions   Lymph nodes:   Cervical, supraclavicular, and axillary nodes normal   Neurologic:   CNII-XII intact. Normal strength, sensation and reflexes       throughout      Results for orders placed or performed in visit on 10/14/22   Basic Metabolic Panel    Specimen: Blood   Result Value Ref Range    Glucose 123 (H) 65 - 99 mg/dL    BUN 14 8 - 23 mg/dL    Creatinine 1.18 0.76 - 1.27 mg/dL    EGFR Result 66.4 >60.0 mL/min/1.73    BUN/Creatinine Ratio 11.9 7.0 - 25.0    Sodium 142 136 - 145 mmol/L    Potassium 5.2 3.5 - 5.2 mmol/L    Chloride 103 98 - 107 mmol/L    Total CO2 27.0 22.0 - 29.0 mmol/L    Calcium 10.3 8.6 - 10.5 mg/dL   Magnesium    Specimen: Blood   Result Value Ref Range    Magnesium 3.4 (H) 1.6 - 2.4 mg/dL         Assessment & Plan   Syncope seems Cardiogenic per hx. Will need further eval.     Syncope.  Thursday last week. Reach for table as he was not feeling right. dizzzy and spinning room.    Prior to spell he just got up fro sitting in the evening. Not out in heat and well hydrated.    Felt fine after.     See [facility]. Ekg.    \  EKG with  Sinus arrythmia          Diagnoses and all orders for this visit:    1. Primary hypertension (Primary)    2. Syncope, unspecified syncope type  -     US carotid bilateral  -     Ambulatory Referral to Cardiology      No follow-ups on file.          There are no Patient Instructions on file for this visit.     Johnathon Allan MD    Assessment & Plan       Answers for HPI/ROS submitted by the patient on 10/10/2022  What is the primary reason for your visit?: Other  Please describe your symptoms.: It’s a routine check up  Have you had these symptoms before?: Yes  How long have you been having these symptoms?: Greater than 2 weeks

## 2022-10-28 ENCOUNTER — HOSPITAL ENCOUNTER (OUTPATIENT)
Dept: ULTRASOUND IMAGING | Facility: HOSPITAL | Age: 72
Discharge: HOME | End: 2022-10-28
Admitting: INTERNAL MEDICINE

## 2022-10-28 PROCEDURE — 93880 EXTRACRANIAL BILAT STUDY: CPT

## 2022-11-08 ENCOUNTER — OFFICE VISIT (OUTPATIENT)
Dept: CARDIOLOGY | Facility: CLINIC | Age: 72
End: 2022-11-08

## 2022-11-08 VITALS
HEIGHT: 70 IN | SYSTOLIC BLOOD PRESSURE: 124 MMHG | HEART RATE: 115 BPM | OXYGEN SATURATION: 96 % | WEIGHT: 219 LBS | BODY MASS INDEX: 31.35 KG/M2 | DIASTOLIC BLOOD PRESSURE: 72 MMHG

## 2022-11-08 DIAGNOSIS — R55 SYNCOPE, UNSPECIFIED SYNCOPE TYPE: Primary | ICD-10-CM

## 2022-11-08 PROCEDURE — 93000 ELECTROCARDIOGRAM COMPLETE: CPT | Performed by: NURSE PRACTITIONER

## 2022-11-08 PROCEDURE — 99214 OFFICE O/P EST MOD 30 MIN: CPT | Performed by: NURSE PRACTITIONER

## 2022-11-08 NOTE — PROGRESS NOTES
"             [facility] Cardiology Office Follow Up Note    Mohsen Bucio  [id number]  11/08/2022    Primary Care Provider: Johnathon Allan MD   Referring Provider: Johnathon Allan MD    Chief Complaint: Referred by PCP for syncope    History of Present Illness:   Mr. Mohsen Bucio is a 72 y.o. male who presents to the Cardiology Clinic for follow up of syncope.  The patient has a past medical history significant for hypertension, asthma, chronic kidney disease, and Graves' disease.  He does not have any significant past cardiac history.  He initially presented the cardiology clinic for evaluation of occasional palpitations.  A subsequent 14-day outpatient cardiac monitor in 9/21 showed occasional supraventricular and ventricular ectopy, which was asymptomatic.  He was referred here by his PCP after he suffered a recent syncopal episode at his home.  He presents today for evaluation.  The patient reports that 1 to 2 weeks ago, the patient was dizzy while sitting in his chair.  He states that he stood up and walked to his kitchen and was still dizzy.  He states that suddenly he fell forward and hit his chin on the table but \"came to\" when he hit the floor.  He states that he went into his bathroom cleaned up his face and just went on about his business.  He does report being dizzy all day yesterday.  He denies any recent illness.  He denies chest pain and dyspnea.  He denies orthopnea, PND, and lower extremity edema.  He does have a history of palpitations and SVT, but denies any recent palpitations or episodes associated with his syncope.  He notes that when he was seen at PCP office, his magnesium level was elevated (3.4).  He drinks 1 to 2 cups of coffee daily.  He denies OTC use of energy drinks/supplements/weight loss products.  He denies any caffeinated sodas or tea.  He denies the use of pseudoephedrine.  He denies illicit drug use.  He offers no other complaints or concerns at this " time.    Past Cardiac Testin. Last Coronary Angio: None  2. Prior Stress Testin, reportedly unremarkable  3. Last Echo: None  4. Prior Holter Monitor:  14-day Holter               1.  The predominant rhythm is sinus rhythm with an average heart rate 72 bpm.              2.  Normal atrioventricular conduction.              3.  No sustained arrhythmias.              4.  Occasional supraventricular ectopy with limited runs of SVT with the longest being 6 beats in duration.  Dewart 1.4%.              5.  Occasional ventricular ectopy with limited runs of NSVT with the longest being 5 beats in duration.  Dewart 1.3%.              6.  Two symptomatic events corresponding to NSR.  No correlation of symptoms to ectopy.    Review of Systems:   Review of Systems   Constitutional: Negative for activity change, chills, diaphoresis, fatigue, fever and unexpected weight gain.   Eyes: Negative for blurred vision and visual disturbance.   Respiratory: Negative for apnea, cough, chest tightness, shortness of breath and wheezing.    Cardiovascular: Negative for chest pain, palpitations and leg swelling.   Gastrointestinal: Negative for abdominal distention, blood in stool, GERD and indigestion.   Endocrine: Negative for cold intolerance and heat intolerance.   Genitourinary: Negative for hematuria.   Musculoskeletal: Negative for gait problem, joint swelling and myalgias.   Skin: Negative for color change, pallor and wound.   Neurological: Positive for dizziness and syncope. Negative for seizures, weakness, light-headedness, numbness, headache and confusion.   Hematological: Does not bruise/bleed easily.   Psychiatric/Behavioral: Negative for behavioral problems, sleep disturbance, suicidal ideas and depressed mood.     I have reviewed and confirmed the accuracy of the ROS as documented by the MA/EMERSON/RN LUCITA Wong    I have reviewed and/or updated the patient's past medical, past surgical, family,  "social history, problem list and allergies as appropriate.     Medications:     Current Outpatient Medications:   •  albuterol 108 (90 Base) MCG/ACT inhaler, Inhale 2 puffs Every 4 (Four) Hours As Needed for Wheezing., Disp: , Rfl:   •  Alirocumab (Praluent) 150 MG/ML injection pen, Inject 1 mL under the skin into the appropriate area as directed Every 14 (Fourteen) Days., Disp: 6 pen, Rfl: 3  •  Breo Ellipta 100-25 MCG/INH inhaler, Daily., Disp: , Rfl:   •  Cyanocobalamin 2500 MCG sublingual tablet, Daily, Disp: 90 each, Rfl: 3  •  desloratadine (CLARINEX) 5 MG tablet, Take 5 mg by mouth Daily As Needed., Disp: , Rfl:   •  famotidine (Pepcid) 20 MG tablet, Take 1 tablet by mouth 2 (Two) Times a Day As Needed for Heartburn., Disp: 180 tablet, Rfl: 3  •  lisinopril (PRINIVIL,ZESTRIL) 20 MG tablet, Take 1 tablet by mouth Daily., Disp: 90 tablet, Rfl: 3    Physical Exam:  Vital Signs:   Vitals:    11/08/22 1422   BP: 124/72   BP Location: Right arm   Patient Position: Sitting   Cuff Size: Adult   Pulse: 115   SpO2: 96%   Weight: 99.3 kg (219 lb)   Height: 177.8 cm (70\")     Body mass index is 31.42 kg/m².    Physical Exam  Vitals and nursing note reviewed.   Constitutional:       General: He is not in acute distress.     Appearance: Normal appearance. He is well-developed.   HENT:      Head: Normocephalic and atraumatic.   Eyes:      General: No scleral icterus.     Extraocular Movements: Extraocular movements intact.   Neck:      Trachea: Trachea normal.   Cardiovascular:      Rate and Rhythm: Regular rhythm. Tachycardia present.      Pulses: Normal pulses.      Heart sounds: Normal heart sounds. No murmur heard.    No friction rub. No gallop.   Pulmonary:      Effort: Pulmonary effort is normal.      Breath sounds: Normal breath sounds.   Musculoskeletal:         General: Normal range of motion.      Cervical back: Neck supple.      Right lower leg: No edema.      Left lower leg: No edema.   Skin:     General: Skin " is warm and dry.      Findings: No bruising, lesion or rash.   Neurological:      Mental Status: He is alert and oriented to person, place, and time.      Motor: No weakness.      Gait: Gait normal.   Psychiatric:         Mood and Affect: Mood normal.         Behavior: Behavior normal. Behavior is cooperative.         Thought Content: Thought content does not include suicidal ideation.         Results Review:   I reviewed the patient's new clinical results.      ECG 12 Lead    Date/Time: 11/8/2022 2:48 PM  Performed by: Grace Zhang APRN  Authorized by: Grace Zhang APRN   Comparison: compared with previous ECG from 10/14/2022  Rhythm: sinus tachycardia  Rate: normal  BPM: 115  QRS axis: normal  Comments: rSr'(V1) - probable normal variant  Borderline ECG            Assessment / Plan:   Diagnoses and all orders for this visit:    1. Syncope, unspecified syncope type (Primary)  --ECG shows sinus tachycardia  --9/21 showed rare supraventricular and ventricular ectopy, which was asymptomatic  --Recent TSH was within normal limits  --Recent elevated magnesium level  --Plan for echocardiogram to rule out structural/functional abnormalities  --Plan for outpatient cardiac monitor study      2. Essential hypertension  -- Currently well controlled       Preventative Cardiology:   Tobacco Cessation: N/A   Advance Care Planning: ACP discussion was declined by the patient. Patient does not have an advance directive, declines further assistance.     Follow Up:   Return in about 1 month (around 12/8/2022) for Follow-up with Dr. Hodge.      Thank you for allowing me to participate in the care of your patient. Please to not hesitate to contact me with additional questions or concerns.     LUCITA Pardo

## 2022-12-01 ENCOUNTER — PRIOR AUTHORIZATION (OUTPATIENT)
Dept: INTERNAL MEDICINE | Facility: CLINIC | Age: 72
End: 2022-12-01

## 2022-12-15 ENCOUNTER — TELEPHONE (OUTPATIENT)
Dept: INTERNAL MEDICINE | Facility: CLINIC | Age: 72
End: 2022-12-15

## 2022-12-15 NOTE — TELEPHONE ENCOUNTER
Caller: Mohsen Bucio    Relationship: Self    Best call back number:     [phone]    Which medication are you concerned about:     Evolocumab (REPATHA) solution prefilled syringe injection    Who prescribed you this medication:     DR CHERY    What are your concerns:     PATIENT STATED THE PRIOR AUTHORIZATION FOR THE MEDICATION LISTED ABOVE WAS DENIED    PATIENT IS CONCERNED AND REQUESTED A CALL BACK WITH NEXT STEPS FOR THIS PROCESS

## 2022-12-15 NOTE — TELEPHONE ENCOUNTER
We denied this request under Medicare Part D because:  Based on the information your doctor gave us about your health condition, we are not able to  approve Repatha Sure Inj 140mg/Ml.  Repatha Sureclick is denied for not meeting the following prior authorization requirement(s).  Medication authorization requires the following:  (1) One of the following low-density lipoprotein cholesterol values while on maximally tolerated  lipid lowering therapy within the last 120 days:  (A) Low-density lipoprotein cholesterol greater than or equal to 70mg/dL with atherosclerotic  cardiovascular disease.  (B) Low-density lipoprotein cholesterol greater than or equal to 100mg/dL without  atherosclerotic cardiovascular disease.  Reviewed by: Alfredo ferrara  Form [id number]  OMB Approval No.[id number]

## 2023-01-17 ENCOUNTER — OFFICE VISIT (OUTPATIENT)
Dept: CARDIOLOGY | Facility: CLINIC | Age: 73
End: 2023-01-17
Payer: MEDICARE

## 2023-01-17 VITALS
HEIGHT: 70 IN | BODY MASS INDEX: 31.78 KG/M2 | DIASTOLIC BLOOD PRESSURE: 80 MMHG | OXYGEN SATURATION: 95 % | HEART RATE: 88 BPM | WEIGHT: 222 LBS | SYSTOLIC BLOOD PRESSURE: 126 MMHG | RESPIRATION RATE: 16 BRPM

## 2023-01-17 DIAGNOSIS — E78.2 MIXED HYPERLIPIDEMIA: ICD-10-CM

## 2023-01-17 DIAGNOSIS — Z78.9 STATIN INTOLERANCE: ICD-10-CM

## 2023-01-17 DIAGNOSIS — I10 PRIMARY HYPERTENSION: ICD-10-CM

## 2023-01-17 DIAGNOSIS — N18.2 STAGE 2 CHRONIC KIDNEY DISEASE: ICD-10-CM

## 2023-01-17 DIAGNOSIS — R00.2 PALPITATIONS: Primary | ICD-10-CM

## 2023-01-17 PROCEDURE — 99214 OFFICE O/P EST MOD 30 MIN: CPT | Performed by: INTERNAL MEDICINE

## 2023-01-17 RX ORDER — METOPROLOL SUCCINATE 25 MG/1
25 TABLET, EXTENDED RELEASE ORAL DAILY
Qty: 30 TABLET | Refills: 11 | Status: SHIPPED | OUTPATIENT
Start: 2023-01-17 | End: 2023-03-07

## 2023-01-17 NOTE — PROGRESS NOTES
[facility] Cardiology Office Follow Up Note    Mohsen Bucio  [id number]  2023    Primary Care Provider: Johnathon Allan MD    Chief Complaint: Routine follow-up    History of Present Illness:   Mr. Mohsen Bucio is a 72y.o. male who presents to the Cardiology Clinic for routine follow-up. The patient has a past medical history significant for hypertension, asthma, chronic kidney disease, and Graves' disease.  He does not have any significant past cardiac history.  He initially presented the cardiology clinic for evaluation of occasional palpitations.  A subsequent 14-day outpatient cardiac monitor in  showed occasional supraventricular and ventricular ectopy, which was asymptomatic.   Due to recurrent palpitations, he underwent repeat cardiac monitoring in  with no significant sustained arrhythmias.  Reported symptoms corresponded to NSR.  He returns today for follow-up.  Since his his last appointment, the patient reports he has been well without significant changes in his health.  He has not had any further episodes of palpitations.  No chest pain or chest discomfort.  He does report his Praluent is no longer being covered by insurance.  He was prescribed Repatha, however this was initially declined by insurance.  No other new complaints today.    Past Cardiac Testin. Last Coronary Angio: None  2. Prior Stress Testin, reportedly unremarkable  3. Last Echo:    1.  Normal left ventricular size and systolic function, LVEF 60-65%.   2.  Grade 1 diastolic dysfunction.   3.  Mild concentric LVH.   4.  Normal left atrial volume index   5.  Normal right ventricular size and systolic function.   6.  No significant valvular abnormalities.  4. Prior Holter Monitor:    1.  The predominant rhythm is sinus rhythm with an average heart rate 74 bpm.   2.  Normal atrioventricular conduction.   3.  No sustained arrhythmias.   4.  Rare ventricular ectopy  "including limited NSVT.   5.  One 8.6-second run of tachycardia, cannot rule out brief nonsustained atrial fibrillation.   6.  Symptom diary submitted with 7 symptomatic episodes including \"fluttering and lightheadedness\" corresponding to NSR.      Review of Systems:   Review of Systems   Constitutional: Negative for activity change, appetite change, chills, diaphoresis, fatigue, fever, unexpected weight gain and unexpected weight loss.   Eyes: Negative for blurred vision and double vision.   Respiratory: Negative for cough, chest tightness, shortness of breath and wheezing.    Cardiovascular: Negative for chest pain, palpitations and leg swelling.   Gastrointestinal: Negative for abdominal pain, anal bleeding, blood in stool and GERD.   Endocrine: Negative for cold intolerance and heat intolerance.   Genitourinary: Negative for hematuria.   Neurological: Negative for dizziness, syncope, weakness and light-headedness.   Hematological: Does not bruise/bleed easily.   Psychiatric/Behavioral: Negative for depressed mood and stress. The patient is not nervous/anxious.        I have reviewed and/or updated the patient's past medical, past surgical, family, social history, problem list and allergies as appropriate.     Medications:     Current Outpatient Medications:   •  albuterol 108 (90 Base) MCG/ACT inhaler, Inhale 2 puffs Every 4 (Four) Hours As Needed for Wheezing., Disp: , Rfl:   •  Breo Ellipta 100-25 MCG/INH inhaler, Daily., Disp: , Rfl:   •  Cyanocobalamin 2500 MCG sublingual tablet, Daily, Disp: 90 each, Rfl: 3  •  desloratadine (CLARINEX) 5 MG tablet, Take 5 mg by mouth Daily As Needed., Disp: , Rfl:   •  Evolocumab (REPATHA) solution prefilled syringe injection, Inject 1 mL under the skin into the appropriate area as directed Every 14 (Fourteen) Days., Disp: 3 mL, Rfl: 3  •  famotidine (Pepcid) 20 MG tablet, Take 1 tablet by mouth 2 (Two) Times a Day As Needed for Heartburn., Disp: 180 tablet, Rfl: 3  •  " "metoprolol succinate XL (TOPROL-XL) 25 MG 24 hr tablet, Take 1 tablet by mouth Daily., Disp: 30 tablet, Rfl: 11    Physical Exam:  Vital Signs:   Vitals:    01/17/23 0822   BP: 126/80   BP Location: Right arm   Patient Position: Sitting   Pulse: 88   Resp: 16   SpO2: 95%   Weight: 101 kg (222 lb)   Height: 177.8 cm (70\")       Physical Exam  Constitutional:       General: He is not in acute distress.     Appearance: Normal appearance. He is not diaphoretic.   HENT:      Head: Normocephalic and atraumatic.   Cardiovascular:      Rate and Rhythm: Normal rate and regular rhythm.      Heart sounds: No murmur heard.  Pulmonary:      Effort: Pulmonary effort is normal. No respiratory distress.      Breath sounds: Normal breath sounds. No stridor. No wheezing, rhonchi or rales.   Abdominal:      General: Bowel sounds are normal. There is no distension.      Palpations: Abdomen is soft.      Tenderness: There is no abdominal tenderness. There is no guarding or rebound.   Musculoskeletal:         General: No swelling. Normal range of motion.      Cervical back: Neck supple. No tenderness.   Skin:     General: Skin is warm and dry.   Neurological:      General: No focal deficit present.      Mental Status: He is alert and oriented to person, place, and time.   Psychiatric:         Mood and Affect: Mood normal.         Behavior: Behavior normal.         Results Review:   I reviewed the patient's new clinical results.      Assessment / Plan:     1. Palpitations  --  Interval improvement palpitations since last follow-up  --Repeat outpatient cardiac monitoring did not show any sustained arrhythmias.  One 8-second episode of likely supraventricular tachycardia (cannot fully rule out brief PAF) which was asymptomatic  --If recurrent palpitations, would repeat outpatient cardiac monitoring to reassess for PAF  --Given resolution of palpitations, will defer further work-up for now     2.  Hyperlipidemia  --History of intolerance to " multiple statins  --Previously on Praluent, however now not covered by insurance  --Lipid profile in 10/22 showed LDL 46, HDL 41, triglycerides 156  --Currently attempting to approve Repatha    3.  Essential hypertension  -- Given mild intolerance to lisinopril, change to long-acting metoprolol     4.  Chronic kidney disease, stage II  -- Last labs in 10/22 showed GFR 74  --Stable       Follow Up:   Return in about 3 months (around 4/17/2023).      Thank you for allowing me to participate in the care of your patient. Please to not hesitate to contact me with additional questions or concerns.     CARLOZ Hodge MD  Interventional Cardiology   01/17/2023  08:27 EST

## 2023-01-19 ENCOUNTER — TELEPHONE (OUTPATIENT)
Dept: INTERNAL MEDICINE | Facility: CLINIC | Age: 73
End: 2023-01-19
Payer: MEDICARE

## 2023-01-19 NOTE — TELEPHONE ENCOUNTER
Caller: Mohsen Bucio    Relationship: Self    Best call back number: [phone]    Who is your current provider:   BENJAMIN CHERY MD     Who would you like your new provider to be:   ARUNA LANDRUM     What are your reasons for transferring care:   PATIENT STATED THAT HE SEEN ARUNA LANDRUM WHILE BENJAMIN CHERY MD WAS OUT OF OFFICE FOR A WHILE AND REALLY LIKED HER AND WOULD LIKE TO HAVE FRESH START AND NEW LOOK ON HIS HEALTH CARE BY A NEW PROVIDER AND WOULD LIKE TO TRANSFER HIS CARE AND STAY WITH THE PRACTICE

## 2023-01-25 ENCOUNTER — PATIENT MESSAGE (OUTPATIENT)
Dept: INTERNAL MEDICINE | Facility: CLINIC | Age: 73
End: 2023-01-25
Payer: MEDICARE

## 2023-01-25 DIAGNOSIS — I10 PRIMARY HYPERTENSION: ICD-10-CM

## 2023-01-25 DIAGNOSIS — E83.41 HYPERMAGNESEMIA: Primary | ICD-10-CM

## 2023-01-25 NOTE — TELEPHONE ENCOUNTER
Adenike Sheriff MA 1/25/2023 11:43 AM EST      ----- Message -----  From: Mohsen Bucio  Sent: 1/25/2023 9:55 AM EST  To: Michael Garcia [facility]  Subject: New Patient Visit     I now have an appointment with you as my new PCP on March 7th, and I'm wondering if you would like to have me do some blood tests beforehand. Dr Allan was going to have me do at least a Mg test to follow up on the test that you had ordered back in October.  Thank you!

## 2023-03-07 ENCOUNTER — OFFICE VISIT (OUTPATIENT)
Dept: INTERNAL MEDICINE | Facility: CLINIC | Age: 73
End: 2023-03-07
Payer: MEDICARE

## 2023-03-07 VITALS
HEIGHT: 70 IN | OXYGEN SATURATION: 98 % | DIASTOLIC BLOOD PRESSURE: 82 MMHG | TEMPERATURE: 98 F | WEIGHT: 222 LBS | HEART RATE: 113 BPM | BODY MASS INDEX: 31.78 KG/M2 | SYSTOLIC BLOOD PRESSURE: 128 MMHG

## 2023-03-07 DIAGNOSIS — E05.90 HYPERTHYROIDISM: ICD-10-CM

## 2023-03-07 DIAGNOSIS — I10 PRIMARY HYPERTENSION: ICD-10-CM

## 2023-03-07 DIAGNOSIS — Z00.00 ENCOUNTER FOR SUBSEQUENT ANNUAL WELLNESS VISIT (AWV) IN MEDICARE PATIENT: Primary | ICD-10-CM

## 2023-03-07 DIAGNOSIS — J45.30 MILD PERSISTENT ASTHMA WITHOUT COMPLICATION: ICD-10-CM

## 2023-03-07 DIAGNOSIS — Z88.8 ALLERGY TO STATIN MEDICATION: ICD-10-CM

## 2023-03-07 DIAGNOSIS — N18.2 STAGE 2 CHRONIC KIDNEY DISEASE: ICD-10-CM

## 2023-03-07 DIAGNOSIS — Z80.0 FH: COLON CANCER: ICD-10-CM

## 2023-03-07 DIAGNOSIS — E78.2 MIXED HYPERLIPIDEMIA: ICD-10-CM

## 2023-03-07 DIAGNOSIS — Z12.11 COLON CANCER SCREENING: ICD-10-CM

## 2023-03-07 PROCEDURE — 1159F MED LIST DOCD IN RCRD: CPT | Performed by: NURSE PRACTITIONER

## 2023-03-07 PROCEDURE — 1160F RVW MEDS BY RX/DR IN RCRD: CPT | Performed by: NURSE PRACTITIONER

## 2023-03-07 PROCEDURE — G0439 PPPS, SUBSEQ VISIT: HCPCS | Performed by: NURSE PRACTITIONER

## 2023-03-07 PROCEDURE — 3074F SYST BP LT 130 MM HG: CPT | Performed by: NURSE PRACTITIONER

## 2023-03-07 PROCEDURE — 3079F DIAST BP 80-89 MM HG: CPT | Performed by: NURSE PRACTITIONER

## 2023-03-07 RX ORDER — LISINOPRIL 10 MG/1
5 TABLET ORAL DAILY
COMMUNITY

## 2023-03-07 NOTE — PROGRESS NOTES
The ABCs of the Annual Wellness Visit  Subsequent Medicare Wellness Visit    Subjective      Mohsen Bucio is a 73 y.o. male who presents for a Subsequent Medicare Wellness Visit.    The following portions of the patient's history were reviewed and   updated as appropriate: allergies, current medications, past family history, past medical history, past social history, past surgical history and problem list.    Compared to one year ago, the patient feels his physical   health is the same.    Compared to one year ago, the patient feels his mental   health is the same.    Recent Hospitalizations:  He was not admitted to the hospital during the last year.       Current Medical Providers:  Patient Care Team:  Ronel Hyatt APRN as PCP - General (Family Medicine)    Outpatient Medications Prior to Visit   Medication Sig Dispense Refill   • albuterol 108 (90 Base) MCG/ACT inhaler Inhale 2 puffs Every 4 (Four) Hours As Needed for Wheezing.     • Breo Ellipta 100-25 MCG/INH inhaler Daily.     • Cyanocobalamin 2500 MCG sublingual tablet Daily 90 each 3   • desloratadine (CLARINEX) 5 MG tablet Take 1 tablet by mouth Daily As Needed.     • famotidine (Pepcid) 20 MG tablet Take 1 tablet by mouth 2 (Two) Times a Day As Needed for Heartburn. 180 tablet 3   • lisinopril (PRINIVIL,ZESTRIL) 10 MG tablet Take 5 mg by mouth Daily.     • Evolocumab (REPATHA) solution prefilled syringe injection Inject 1 mL under the skin into the appropriate area as directed Every 14 (Fourteen) Days. 3 mL 3   • metoprolol succinate XL (TOPROL-XL) 25 MG 24 hr tablet Take 1 tablet by mouth Daily. 30 tablet 11     No facility-administered medications prior to visit.       No opioid medication identified on active medication list. I have reviewed chart for other potential  high risk medication/s and harmful drug interactions in the elderly.          Aspirin is not on active medication list.  Aspirin use is not indicated based on review of current  "medical condition/s. Risk of harm outweighs potential benefits.  .    Patient Active Problem List   Diagnosis   • Atopic rhinitis   • Asthma   • Chronic cough   • Hypercholesterolemia   • Hyperthyroidism   • Hypothyroidism   • Uninodular goiter   • Multiple thyroid nodules   • Cobalamin deficiency   • Bilateral impacted cerumen   • Stage 2 chronic kidney disease   • Statin intolerance   • Mixed hyperlipidemia   • Primary hypertension     Advance Care Planning  Advance Directive is not on file.  ACP discussion was held with the patient during this visit. Patient does not have an advance directive, information provided.     Objective    Vitals:    03/07/23 1252   BP: 128/82   Pulse: 113   Temp: 98 °F (36.7 °C)   SpO2: 98%   Weight: 101 kg (222 lb)   Height: 177.8 cm (70\")   PainSc: 0-No pain     Estimated body mass index is 31.85 kg/m² as calculated from the following:    Height as of this encounter: 177.8 cm (70\").    Weight as of this encounter: 101 kg (222 lb).    BMI is >= 30 and <35. (Class 1 Obesity). The following options were offered after discussion;: exercise counseling/recommendations and nutrition counseling/recommendations      Does the patient have evidence of cognitive impairment?   No    Lab Results   Component Value Date    CHLPL 242 (H) 03/15/2023    TRIG 119 03/15/2023    HDL 36 (L) 03/15/2023     (H) 03/15/2023    VLDL 22 03/15/2023          HEALTH RISK ASSESSMENT    Smoking Status:  Social History     Tobacco Use   Smoking Status Never   Smokeless Tobacco Never     Alcohol Consumption:  Social History     Substance and Sexual Activity   Alcohol Use Yes   • Alcohol/week: 5.0 standard drinks   • Types: 5 Drinks containing 0.5 oz of alcohol per week    Comment: occ     Fall Risk Screen:    STEADI Fall Risk Assessment was completed, and patient is at LOW risk for falls.Assessment completed on:3/7/2023    Depression Screening:  PHQ-2/PHQ-9 Depression Screening 3/7/2023   Little Interest or " Pleasure in Doing Things 0-->not at all   Feeling Down, Depressed or Hopeless 0-->not at all   PHQ-9: Brief Depression Severity Measure Score 0       Health Habits and Functional and Cognitive Screening:  Functional & Cognitive Status 3/7/2023   Do you have difficulty preparing food and eating? No   Do you have difficulty bathing yourself, getting dressed or grooming yourself? No   Do you have difficulty using the toilet? No   Do you have difficulty moving around from place to place? No   Do you have trouble with steps or getting out of a bed or a chair? No   Current Diet Well Balanced Diet   Dental Exam Up to date   Eye Exam Up to date   Exercise (times per week) 6 times per week   Current Exercises Include Walking;Yard Work;Gardening   Current Exercise Activities Include -   Do you need help using the phone?  No   Are you deaf or do you have serious difficulty hearing?  No   Do you need help with transportation? No   Do you need help shopping? No   Do you need help preparing meals?  No   Do you need help with housework?  No   Do you need help with laundry? No   Do you need help taking your medications? No   Do you need help managing money? No   Do you ever drive or ride in a car without wearing a seat belt? No   Have you felt unusual stress, anger or loneliness in the last month? No   Who do you live with? Spouse   If you need help, do you have trouble finding someone available to you? No   Have you been bothered in the last four weeks by sexual problems? No   Do you have difficulty concentrating, remembering or making decisions? No       Age-appropriate Screening Schedule:  Refer to the list below for future screening recommendations based on patient's age, sex and/or medical conditions. Orders for these recommended tests are listed in the plan section. The patient has been provided with a written plan.    Health Maintenance   Topic Date Due   • ZOSTER VACCINE (1 of 2) 10/17/2023 (Originally 2/7/2002)   • ANNUAL  WELLNESS VISIT  03/07/2024   • LIPID PANEL  03/15/2024   • COLORECTAL CANCER SCREENING  09/07/2026   • TDAP/TD VACCINES (5 - Td or Tdap) 03/21/2032   • COVID-19 Vaccine  Completed   • INFLUENZA VACCINE  Completed   • Pneumococcal Vaccine 65+  Completed   • HEPATITIS C SCREENING  Addressed                CMS Preventative Services Quick Reference  Risk Factors Identified During Encounter:    Inactivity/Sedentary: Patient was advised to exercise at least 150 minutes a week per CDC recommendations.  Polypharmacy: Medication List reviewed    The above risks/problems have been discussed with the patient.  Pertinent information has been shared with the patient in the After Visit Summary.    Diagnoses and all orders for this visit:    1. Encounter for subsequent annual wellness visit (AWV) in Medicare patient (Primary)    2. Mixed hyperlipidemia  -     Lipid Panel  -     Bempedoic Acid 180 MG tablet; Take 1 tablet by mouth Daily.  Dispense: 90 tablet; Refill: 3    3. Primary hypertension        - BMP on 2/27/2023 normal        - Follow heart healthy diet.  Keep sodium intake < 1500 mg per day.  Avoid processed & fast foods.          - Exercise as tolerated, with a goal of 30 minutes of moderate exercise most days.         - Take medications as prescribed.    4. Stage 2 chronic kidney disease        - Keep blood pressure well controlled.        - Abstain from NSAIDs    5. Mild persistent asthma without complication        - Use albuterol and Breo inhalers as prescribed    6. Hyperthyroidism  -     T3, Free  -     T4, Free  -     TSH    7. FH: colon cancer  -     Ambulatory Referral For Screening Colonoscopy    8. Colon cancer screening  -     Ambulatory Referral For Screening Colonoscopy    9. Allergy to statin medication  -     Bempedoic Acid 180 MG tablet; Take 1 tablet by mouth Daily.  Dispense: 90 tablet; Refill: 3        Follow Up:   Next Medicare Wellness visit to be scheduled in 1 year.      An After Visit Summary and  PPPS were made available to the patient.

## 2023-03-16 LAB
CHOLEST SERPL-MCNC: 242 MG/DL (ref 0–200)
HDLC SERPL-MCNC: 36 MG/DL (ref 40–60)
LDLC SERPL CALC-MCNC: 184 MG/DL (ref 0–100)
T3FREE SERPL-MCNC: 2.7 PG/ML (ref 2–4.4)
T4 FREE SERPL-MCNC: 1.29 NG/DL (ref 0.93–1.7)
TRIGL SERPL-MCNC: 119 MG/DL (ref 0–150)
TSH SERPL DL<=0.005 MIU/L-ACNC: 0.28 UIU/ML (ref 0.27–4.2)
VLDLC SERPL CALC-MCNC: 22 MG/DL (ref 5–40)

## 2023-03-17 ENCOUNTER — PRIOR AUTHORIZATION (OUTPATIENT)
Dept: INTERNAL MEDICINE | Facility: CLINIC | Age: 73
End: 2023-03-17
Payer: MEDICARE

## 2023-03-17 NOTE — TELEPHONE ENCOUNTER
Denied today  Request Reference Number: [id number]. NEXLETOL TAB 180MG is denied for not meeting the prior authorization requirement(s). Details of this decision are in the notice attached below or have been faxed to you.    Request Reference Number: [id number]. NEXLETOL TAB 180MG is denied for not meeting the prior authorization requirement(s). Details of this decision are in the notice attached below or have been faxed to you.    Paper printed out for Capri to look over

## 2023-03-28 ENCOUNTER — TELEPHONE (OUTPATIENT)
Dept: INTERNAL MEDICINE | Facility: CLINIC | Age: 73
End: 2023-03-28

## 2023-03-28 NOTE — TELEPHONE ENCOUNTER
Caller: [facility] CLINICAL APPEALS - AZALIA    Best call back number: [phone]    AZALIA IS CALLING TO ASK SOME CLINICAL QUESTIONS REGARDING NEXLETOL PRIOR AUTHORIZATION.

## 2023-03-28 NOTE — TELEPHONE ENCOUNTER
Left detailed message for Azalia to call or fax questions needed, otherwise I would try to call her back sometime    HUB, ADMIN MA, AND FRONT OFFICE OK TO GET QUESTIONS FROM AZALIA

## 2023-03-29 ENCOUNTER — TELEPHONE (OUTPATIENT)
Dept: INTERNAL MEDICINE | Facility: CLINIC | Age: 73
End: 2023-03-29
Payer: MEDICARE

## 2023-03-29 DIAGNOSIS — E78.2 MIXED HYPERLIPIDEMIA: ICD-10-CM

## 2023-03-29 DIAGNOSIS — Z88.8 ALLERGY TO STATIN MEDICATION: ICD-10-CM

## 2023-03-29 NOTE — TELEPHONE ENCOUNTER
Pharmacy Name:     [facility] - [place], [place] - [address] [facility] [phone] [facility] [phone]      Pharmacy representative name:     AZALIA    What medication are you calling in regards to:     Bempedoic Acid 180 MG tablet    What question does the pharmacy have:     PHARMACY REQUESTED A CALL BACK FROM ARUNA NEFF REGARDING PRIOR AUTHORIZATION STARTED FOR MEDICATION LISTED ABOVE    PHARMACY STATED THERE ARE ADDITIONAL CLINICAL QUESTIONS     Who is the provider that prescribed the medication:     ARUNA NEFF

## 2023-03-30 NOTE — TELEPHONE ENCOUNTER
Rx resent to [facility]   [facility]  BIN: [id number]  PCN: [id number]  GROUP: [id number]  ID: [id number]

## 2023-03-31 NOTE — TELEPHONE ENCOUNTER
Left message with criss that we faxed the question that was sent to us on Wed, we have since rec the same fax wanting the same info and that I will fax it again, and if there are additional questions to fax those as well

## 2023-03-31 NOTE — TELEPHONE ENCOUNTER
Faxed additional information that was sent 03/29/2023, and faxed again today since another message was left for the same information

## 2023-05-09 ENCOUNTER — OFFICE VISIT (OUTPATIENT)
Dept: NEUROLOGY | Facility: CLINIC | Age: 73
End: 2023-05-09
Payer: MEDICARE

## 2023-05-09 VITALS
WEIGHT: 208 LBS | DIASTOLIC BLOOD PRESSURE: 70 MMHG | HEART RATE: 74 BPM | OXYGEN SATURATION: 96 % | SYSTOLIC BLOOD PRESSURE: 112 MMHG | BODY MASS INDEX: 29.78 KG/M2 | TEMPERATURE: 97.3 F | HEIGHT: 70 IN

## 2023-05-09 DIAGNOSIS — G47.00 INSOMNIA, UNSPECIFIED TYPE: ICD-10-CM

## 2023-05-09 DIAGNOSIS — G47.33 OBSTRUCTIVE SLEEP APNEA: Primary | ICD-10-CM

## 2023-05-09 PROCEDURE — 1159F MED LIST DOCD IN RCRD: CPT | Performed by: NURSE PRACTITIONER

## 2023-05-09 PROCEDURE — 99213 OFFICE O/P EST LOW 20 MIN: CPT | Performed by: NURSE PRACTITIONER

## 2023-05-09 PROCEDURE — 1160F RVW MEDS BY RX/DR IN RCRD: CPT | Performed by: NURSE PRACTITIONER

## 2023-05-09 PROCEDURE — 3078F DIAST BP <80 MM HG: CPT | Performed by: NURSE PRACTITIONER

## 2023-05-09 PROCEDURE — 3074F SYST BP LT 130 MM HG: CPT | Performed by: NURSE PRACTITIONER

## 2023-05-09 NOTE — PROGRESS NOTES
Follow up Sleep Patient Office Visit      Patient Name: Mohsen Bucio  : 1950   MRN: [id number]     Referring Physician: No ref. provider found    Chief Complaint:    Chief Complaint   Patient presents with   • Follow-up     ANN; patient doing well with current therapy       History of Present Illness: Mohsen Bucio is a 73 y.o. male who is here today to follow up with ANN and was last seen on 5/10/2022.  Currently on AutoPap 6-16cm, average P95 6.9cm, AHI 0.2/hour, compliance 83.9%.  He is tolerating his pressures well.  He is using [employer] and a full face mask.  He is having difficulty with sleep maintenance.  He awakens during sleep, about once per night, and is unable to fall back to sleep- around 6769-3199.  He has had these issues for a couple of years and feels he slept better prior to custodial in .  He goes to bed around 2200 and is able to fall asleep easily.  He wakes up around 0735-3106.  Some days when he wakes up in the mornings he feels well-rested but on other days not so much.  He denies excessive daytime sleepiness and feels his daytime energy level is quite good.      Pertinent Medical History:   Current compliance report reviewed and has been scanned into the patient's medical record.    Following taken from previous visit note:  Mohsen Bucio is a 72 y.o. male who is here today to follow up with ANN and was seen for a sleep consult on 10/12/2021.  Currently on AutoPap 6/16cm, mean pressure 6.2, therapy and tolerating pressures well, compliance >4 hours 86.7%, AHI 0.2/hour.  He is tolerating his pressures well.  He has had symptomatic improvement since starting AutoPap therapy.  He feels much better now.  He is using [employer]; using a full face mask; regular tubing.    *Home sleep study on 2021 showed moderate ANN with an AHI of 21/hour.      Subjective      Review of Systems:   Review of Systems   Constitutional: Negative for chills, fatigue and fever.   HENT:  Negative for facial swelling, hearing loss, sore throat, tinnitus and trouble swallowing.    Eyes: Negative for blurred vision, double vision, photophobia and visual disturbance.   Respiratory: Negative for cough, chest tightness and shortness of breath.    Cardiovascular: Negative for chest pain, palpitations and leg swelling.   Gastrointestinal: Negative for abdominal pain, nausea and vomiting.   Endocrine: Negative for cold intolerance and heat intolerance.   Musculoskeletal: Negative for gait problem, neck pain and neck stiffness.   Skin: Negative for color change and rash.   Allergic/Immunologic: Negative for environmental allergies and food allergies.   Neurological: Negative for dizziness, syncope, speech difficulty, weakness, light-headedness, numbness, headache and memory problem.   Psychiatric/Behavioral: Positive for sleep disturbance. Negative for behavioral problems and depressed mood. The patient is not nervous/anxious.        Medications:     Current Outpatient Medications:   •  albuterol 108 (90 Base) MCG/ACT inhaler, Inhale 2 puffs Every 4 (Four) Hours As Needed for Wheezing., Disp: , Rfl:   •  Bempedoic Acid 180 MG tablet, Take 1 tablet by mouth Daily., Disp: 90 tablet, Rfl: 3  •  Breo Ellipta 100-25 MCG/INH inhaler, Daily., Disp: , Rfl:   •  Cyanocobalamin 2500 MCG sublingual tablet, Daily, Disp: 90 each, Rfl: 3  •  desloratadine (CLARINEX) 5 MG tablet, Take 1 tablet by mouth Daily As Needed., Disp: , Rfl:   •  famotidine (Pepcid) 20 MG tablet, Take 1 tablet by mouth 2 (Two) Times a Day As Needed for Heartburn., Disp: 180 tablet, Rfl: 3  •  lisinopril (PRINIVIL,ZESTRIL) 10 MG tablet, Take 5 mg by mouth Daily., Disp: , Rfl:     Allergies:   Allergies   Allergen Reactions   • Atorvastatin Other (See Comments)     Muscle cramps   • Pitavastatin Other (See Comments)     Muscle cramps   • Pravastatin Other (See Comments)     Muscle cramps   • Rosuvastatin Other (See Comments)     Muscle cramps   •  "Simvastatin Other (See Comments)     Muscle cramps   • Zetia [Ezetimibe] Myalgia   • Penicillins Other (See Comments)     Allergy testing years ago showed patient allergic       Objective     Physical Exam:  Vital Signs:   Vitals:    05/09/23 0902   BP: 112/70   Pulse: 74   Temp: 97.3 °F (36.3 °C)   SpO2: 96%   Weight: 94.3 kg (208 lb)   Height: 177.8 cm (70\")     BMI: Body mass index is 29.84 kg/m².    Physical Exam  Vitals and nursing note reviewed.   Constitutional:       General: He is not in acute distress.     Appearance: He is well-developed. He is not diaphoretic.      Comments: overweight   HENT:      Head: Normocephalic and atraumatic.   Eyes:      Extraocular Movements: Extraocular movements intact.      Conjunctiva/sclera: Conjunctivae normal.      Pupils: Pupils are equal, round, and reactive to light.   Pulmonary:      Effort: Pulmonary effort is normal. No respiratory distress.   Musculoskeletal:         General: Normal range of motion.   Skin:     General: Skin is warm and dry.      Findings: No rash.   Neurological:      Mental Status: He is alert and oriented to person, place, and time.   Psychiatric:         Mood and Affect: Mood normal.         Behavior: Behavior normal.         Thought Content: Thought content normal.         Judgment: Judgment normal.         Assessment / Plan      Assessment/Plan:   Diagnoses and all orders for this visit:    1. Obstructive sleep apnea (Primary)    2. Insomnia, unspecified type    3. BMI 29.0-29.9,adult    *I have encouraged patient to try OTC Melatonin PRN sleep. He is not interested in trying a prescription medication at this time.   *Order for supplies sent to [facility].     Follow Up:   Return in about 1 year (around 5/9/2024) for F/U Obstructive Sleep Apnea.    I have advised the patient the need to continue the use of CPAP.  Gold standard for treatment of sleep apnea includes weight loss, use of cpap and avoidance of alcohol.  Untreated ANN may increase " the risk for development of hypertension, stroke, myocardial infarction, diabetes, cardiovascular disease, work-related issues and driving accidents. I have counseled and advised the patient to avoid driving or operating heavy/dangerous equipment if feeling drowsy.     LUCITA Callejas, FNP-C  [facility]

## 2023-05-17 ENCOUNTER — OFFICE VISIT (OUTPATIENT)
Dept: INTERNAL MEDICINE | Facility: CLINIC | Age: 73
End: 2023-05-17
Payer: MEDICARE

## 2023-05-17 VITALS
WEIGHT: 206 LBS | OXYGEN SATURATION: 98 % | HEART RATE: 58 BPM | BODY MASS INDEX: 29.49 KG/M2 | SYSTOLIC BLOOD PRESSURE: 116 MMHG | TEMPERATURE: 98.4 F | DIASTOLIC BLOOD PRESSURE: 82 MMHG | HEIGHT: 70 IN

## 2023-05-17 DIAGNOSIS — Z88.8 ALLERGY TO STATIN MEDICATION: ICD-10-CM

## 2023-05-17 DIAGNOSIS — N18.2 STAGE 2 CHRONIC KIDNEY DISEASE: ICD-10-CM

## 2023-05-17 DIAGNOSIS — I10 PRIMARY HYPERTENSION: ICD-10-CM

## 2023-05-17 DIAGNOSIS — Z51.81 ENCOUNTER FOR THERAPEUTIC DRUG MONITORING: ICD-10-CM

## 2023-05-17 DIAGNOSIS — E78.2 MIXED HYPERLIPIDEMIA: Primary | ICD-10-CM

## 2023-05-17 NOTE — PROGRESS NOTES
Office Visit      Patient Name: Mohsen Bucio  : 1950   MRN: [id number]     Chief Complaint:    Chief Complaint   Patient presents with   • Hypertension       History of Present Illness: Mohsen Bucio is a 73 y.o. male who is here today for follow up of HTN, hyperlipidemia.  Not taking lisinopril.  Monitors blood pressure at home, WNL.  Has purposefully lost 16 pounds in the past 2 months.  Eating a salad before dinner.  Has been decreasing unhealthy carbs.  Eats fiber first with meals, then protein and carbs last.  Continues to be physically active most days of the week.  Unable to tolerate statins.  Taking bempedoic acid, expensive.  He would like to verify it is effective to justify cost. Denies chest pain, dyspnea, orthopnea, palpitations, lower extremity edema, confusion, headaches, weakness, visual disturbances.      Subjective      I have reviewed and the following portions of the patient's history were updated as appropriate: past family history, past medical history, past social history, past surgical history and problem list.      Current Outpatient Medications:   •  albuterol 108 (90 Base) MCG/ACT inhaler, Inhale 2 puffs Every 4 (Four) Hours As Needed for Wheezing., Disp: , Rfl:   •  Bempedoic Acid 180 MG tablet, Take 1 tablet by mouth Daily., Disp: 90 tablet, Rfl: 3  •  Breo Ellipta 100-25 MCG/INH inhaler, Daily., Disp: , Rfl:   •  Cyanocobalamin 2500 MCG sublingual tablet, Daily, Disp: 90 each, Rfl: 3  •  desloratadine (CLARINEX) 5 MG tablet, Take 1 tablet by mouth Daily As Needed., Disp: , Rfl:   •  famotidine (Pepcid) 20 MG tablet, Take 1 tablet by mouth 2 (Two) Times a Day As Needed for Heartburn., Disp: 180 tablet, Rfl: 3    Allergies   Allergen Reactions   • Atorvastatin Other (See Comments)     Muscle cramps   • Pitavastatin Other (See Comments)     Muscle cramps   • Pravastatin Other (See Comments)     Muscle cramps   • Rosuvastatin Other (See Comments)     Muscle cramps   •  "Simvastatin Other (See Comments)     Muscle cramps   • Zetia [Ezetimibe] Myalgia   • Penicillins Other (See Comments)     Allergy testing years ago showed patient allergic       Objective     Physical Exam:  Vital Signs:   Vitals:    05/17/23 0917   BP: 116/82   Pulse: 58   Temp: 98.4 °F (36.9 °C)   SpO2: 98%   Weight: 93.4 kg (206 lb)   Height: 177.8 cm (70\")     Body mass index is 29.56 kg/m².    Physical Exam  Constitutional:       Appearance: He is not ill-appearing.   HENT:      Head: Normocephalic.      Right Ear: External ear normal.      Left Ear: External ear normal.   Eyes:      Conjunctiva/sclera: Conjunctivae normal.      Pupils: Pupils are equal, round, and reactive to light.   Cardiovascular:      Rate and Rhythm: Normal rate and regular rhythm.      Pulses:           Radial pulses are 2+ on the right side and 2+ on the left side.        Dorsalis pedis pulses are 2+ on the right side and 2+ on the left side.      Heart sounds: Normal heart sounds.   Pulmonary:      Effort: Pulmonary effort is normal.      Breath sounds: Normal breath sounds.   Musculoskeletal:      Cervical back: Normal range of motion and neck supple.   Skin:     General: Skin is warm.      Capillary Refill: Capillary refill takes less than 2 seconds.   Neurological:      Mental Status: He is alert and oriented to person, place, and time.      Coordination: Coordination normal.      Gait: Gait normal.   Psychiatric:         Attention and Perception: Attention normal.         Mood and Affect: Mood and affect normal.         Speech: Speech normal.         Behavior: Behavior normal.             Assessment / Plan      Assessment/Plan:   Diagnoses and all orders for this visit:    1. Mixed hyperlipidemia (Primary)  -     Lipid Panel  2. Primary hypertension  -     Comprehensive Metabolic Panel        - Follow heart healthy diet.  Keep sodium intake < 1500 mg per day.  Avoid processed & fast foods.          - Exercise as tolerated, with a " goal of 30 minutes of moderate exercise most days.         - Continue to monitor blood pressure regularly    3. Stage 2 chronic kidney disease  -     Comprehensive Metabolic Panel  - Avoid NSAIDs  - Stay well-hydrated    4. Allergy to statin medication  -     Uric Acid    5. Encounter for therapeutic drug monitoring  -     Uric Acid             Follow Up:   Return in about 6 months (around 11/17/2023) for Next scheduled follow up.    Patient was given instructions and counseling regarding his condition or for health maintenance advice. Please see specific information pulled into the AVS if appropriate.       [facility] Villag[facility]     Please note that portions of this note may have been completed with a voice recognition program. Efforts were made to edit dictation, but occasionally words are mistranscribed.

## 2023-06-07 ENCOUNTER — OFFICE VISIT (OUTPATIENT)
Dept: GASTROENTEROLOGY | Facility: CLINIC | Age: 73
End: 2023-06-07
Payer: MEDICARE

## 2023-06-07 VITALS
TEMPERATURE: 97.5 F | SYSTOLIC BLOOD PRESSURE: 112 MMHG | BODY MASS INDEX: 29.84 KG/M2 | DIASTOLIC BLOOD PRESSURE: 82 MMHG | WEIGHT: 208 LBS

## 2023-06-07 DIAGNOSIS — K21.9 GASTROESOPHAGEAL REFLUX DISEASE WITHOUT ESOPHAGITIS: Chronic | ICD-10-CM

## 2023-06-07 DIAGNOSIS — Z86.010 PERSONAL HISTORY OF COLONIC POLYPS: Primary | ICD-10-CM

## 2023-06-07 PROBLEM — Z86.0100 PERSONAL HISTORY OF COLONIC POLYPS: Status: ACTIVE | Noted: 2023-06-07

## 2023-06-07 RX ORDER — LISINOPRIL 2.5 MG/1
2.5 TABLET ORAL DAILY
COMMUNITY

## 2023-06-07 RX ORDER — SODIUM CHLORIDE 9 MG/ML
70 INJECTION, SOLUTION INTRAVENOUS CONTINUOUS PRN
OUTPATIENT
Start: 2023-06-07

## 2023-06-07 RX ORDER — SODIUM, POTASSIUM,MAG SULFATES 17.5-3.13G
SOLUTION, RECONSTITUTED, ORAL ORAL
Qty: 177 ML | Refills: 0 | Status: SHIPPED | OUTPATIENT
Start: 2023-06-07

## 2023-06-07 NOTE — PROGRESS NOTES
New Patient Consult      Date: 2023   Patient Name: Mohsen Bucio  MRN: [id number]  : 1950     Primary Care Provider: Ronel Hyatt APRN    Chief Complaint   Patient presents with    Colon Cancer Screening     History of Present Illness: Mohsen Bucio is a 73 y.o. male who is here today to establish care with gastroenterology for colon cancer screening.     The patient denies recent change in bowel habits. There is no diarrhea or constipation. There is no history of abdominal pain. There is no history of overt GI bleed (hematemesis melena or hematochezia). The patient denies nausea or vomiting. There is a history of occasional reflux that is reasonably controlled with Pepcid 20 mg as needed. The patient denies dysphagia or odynophagia. There is no history of recent significant weight loss. There is no history of liver disease in the past. There is a family history of colon cancer in his grandfather diagnosed in his mid 70's. No other family history of GI malignancy. The patient's last colonoscopy was in  by Dr. Santamaria with polyp removed.     Subjective      Past Medical History:   Diagnosis Date    Allergic 1965    Pollen, weeds, mold, dust    Arrhythmia     Asthma     Bronchitis     Bursitis     Chest pain     Colon polyp 2016    colonoscopy 2016    Degenerative disc disease, cervical     GERD (gastroesophageal reflux disease) 2019    Graves disease     Hyperlipidemia     Hypertension     Hyperthyroidism     Myalgia     Myositis     Neuroma of foot     Sleep apnea     Use CPAP machine    Stage 2 chronic kidney disease 2023    Tennis elbow     Urinary tract infection      Past Surgical History:   Procedure Laterality Date    ADENOIDECTOMY  1969    APPENDECTOMY      COLONOSCOPY  2016    HERNIA REPAIR      INGUINAL HERNIA REPAIR      TONSILLECTOMY       Family History   Problem Relation Age of Onset    Benign prostatic hyperplasia Father     Stroke  Father     Cancer Father         lung    Hyperlipidemia Father     Colon cancer Paternal Grandfather         diagnosed mid 70's    Stroke Other     Cancer Other      Social History     Socioeconomic History    Marital status:    Tobacco Use    Smoking status: Never    Smokeless tobacco: Never   Vaping Use    Vaping Use: Never used   Substance and Sexual Activity    Alcohol use: Yes     Alcohol/week: 5.0 standard drinks     Types: 5 Drinks containing 0.5 oz of alcohol per week     Comment: occ    Drug use: Never    Sexual activity: Yes     Partners: Female     Birth control/protection: Post-menopausal       Current Outpatient Medications:     albuterol 108 (90 Base) MCG/ACT inhaler, Inhale 2 puffs Every 4 (Four) Hours As Needed for Wheezing., Disp: , Rfl:     Bempedoic Acid 180 MG tablet, Take 1 tablet by mouth Daily., Disp: 90 tablet, Rfl: 3    Breo Ellipta 100-25 MCG/INH inhaler, Daily., Disp: , Rfl:     Cyanocobalamin 2500 MCG sublingual tablet, Daily, Disp: 90 each, Rfl: 3    desloratadine (CLARINEX) 5 MG tablet, Take 1 tablet by mouth Daily As Needed., Disp: , Rfl:     famotidine (Pepcid) 20 MG tablet, Take 1 tablet by mouth 2 (Two) Times a Day As Needed for Heartburn., Disp: 180 tablet, Rfl: 3    lisinopril (PRINIVIL,ZESTRIL) 2.5 MG tablet, Take 1 tablet by mouth Daily., Disp: , Rfl:     sodium-potassium-magnesium sulfates (Suprep Bowel Prep Kit) 17.5-3.13-1.6 GM/177ML solution oral solution, Use as directed for colonoscopy prep. Patient has instructions., Disp: 177 mL, Rfl: 0     Allergies   Allergen Reactions    Atorvastatin Other (See Comments)     Muscle cramps    Pitavastatin Other (See Comments)     Muscle cramps    Pravastatin Other (See Comments)     Muscle cramps    Rosuvastatin Other (See Comments)     Muscle cramps    Simvastatin Other (See Comments)     Muscle cramps    Zetia [Ezetimibe] Myalgia    Penicillins Other (See Comments)     Allergy testing years ago showed patient allergic     The  following portions of the patient's history were reviewed and updated as appropriate: allergies, current medications, past family history, past medical history, past social history, past surgical history and problem list.    Objective     Physical Exam  Vitals and nursing note reviewed.   Constitutional:       General: He is not in acute distress.     Appearance: Normal appearance. He is well-developed.   HENT:      Head: Normocephalic and atraumatic.      Mouth/Throat:      Mouth: Mucous membranes are not pale, not dry and not cyanotic.   Eyes:      General: Lids are normal.   Neck:      Trachea: Trachea normal.   Cardiovascular:      Rate and Rhythm: Normal rate.   Pulmonary:      Effort: Pulmonary effort is normal. No respiratory distress.      Breath sounds: Normal breath sounds.   Abdominal:      Tenderness: There is no abdominal tenderness.   Skin:     General: Skin is warm and dry.   Neurological:      Mental Status: He is alert and oriented to person, place, and time.   Psychiatric:         Mood and Affect: Mood normal.         Speech: Speech normal.         Behavior: Behavior normal. Behavior is cooperative.     Vitals:    06/07/23 1010   BP: 112/82   Temp: 97.5 °F (36.4 °C)   TempSrc: Infrared   Weight: 94.3 kg (208 lb)     Body mass index is 29.84 kg/m².     Results Review:   I have reviewed the patient's new clinical and imaging results.    No visits with results within 90 Day(s) from this visit.   Latest known visit with results is:   Office Visit on 03/07/2023   Component Date Value Ref Range Status    Total Cholesterol 03/15/2023 242 (H)  0 - 200 mg/dL Final    Triglycerides 03/15/2023 119  0 - 150 mg/dL Final    HDL Cholesterol 03/15/2023 36 (L)  40 - 60 mg/dL Final    VLDL Cholesterol Stoney 03/15/2023 22  5 - 40 mg/dL Final    LDL Chol Calc (NIH) 03/15/2023 184 (H)  0 - 100 mg/dL Final    T3, Free 03/15/2023 2.7  2.0 - 4.4 pg/mL Final    Free T4 03/15/2023 1.29  0.93 - 1.70 ng/dL Final    Results may  be falsely increased if patient taking Biotin.    TSH 03/15/2023 0.277  0.270 - 4.200 uIU/mL Final      No radiology results for the last 90 days.     Assessment / Plan      1. Personal history of colonic polyps  The patient's last colonoscopy was in 2016 by Dr. Santamaria with polyp removed. There is a family history of colon cancer in his grandfather diagnosed in his mid 70's.   Colonoscopy for surveillance.    - Case Request  - sodium-potassium-magnesium sulfates (Suprep Bowel Prep Kit) 17.5-3.13-1.6 GM/177ML solution oral solution; Use as directed for colonoscopy prep. Patient has instructions.  Dispense: 177 mL; Refill: 0    2. Gastroesophageal reflux disease without esophagitis  He has a history of occasional reflux that is reasonably controlled with Pepcid 20 mg twice a day as needed. Continue same. No history of difficulty swallowing. He is a non-smoker.  Antireflux measures.    Patient Instructions   Antireflux measures: Avoid fried, fatty foods, alcohol, chocolate, coffee, tea,  soft drinks, all carbonated beverages (including sparkling water), peppermint and spearmint, spicy foods, tomatoes and tomato based foods, onion based foods, and garlic.   Other antireflux measures include weight reduction if overweight, avoiding tight clothing around the abdomen, elevating the head of the bed 6 inches with blocks under the head board, and don't drink or eat before going to bed and avoid lying down immediately after meals.  Pepcid 20 mg 1 po twice a day as needed for reflux.   Colonoscopy: The indications, technique, alternatives and potential risk and complications were discussed with the patient including but not limited to bleeding, perforations, missing lesions and anesthetic complications. The patient understands and wishes to proceed with the procedure and has given their verbal consent. Written patient education information was given to the patient.   The patient will call if they have further questions before  procedure.    Susy Hernandez, APRN  6/7/2023    Please note that portions of this note may have been completed with a voice recognition program.

## 2023-06-07 NOTE — PATIENT INSTRUCTIONS
Antireflux measures: Avoid fried, fatty foods, alcohol, chocolate, coffee, tea,  soft drinks, all carbonated beverages (including sparkling water), peppermint and spearmint, spicy foods, tomatoes and tomato based foods, onion based foods, and garlic.   Other antireflux measures include weight reduction if overweight, avoiding tight clothing around the abdomen, elevating the head of the bed 6 inches with blocks under the head board, and don't drink or eat before going to bed and avoid lying down immediately after meals.  Pepcid 20 mg 1 po twice a day as needed for reflux.   Colonoscopy: The indications, technique, alternatives and potential risk and complications were discussed with the patient including but not limited to bleeding, perforations, missing lesions and anesthetic complications. The patient understands and wishes to proceed with the procedure and has given their verbal consent. Written patient education information was given to the patient.   The patient will call if they have further questions before procedure.

## 2023-06-27 LAB
ALBUMIN SERPL-MCNC: 4.7 G/DL (ref 3.5–5.2)
ALBUMIN/GLOB SERPL: 2 G/DL
ALP SERPL-CCNC: 36 U/L (ref 39–117)
ALT SERPL-CCNC: 34 U/L (ref 1–41)
AST SERPL-CCNC: 35 U/L (ref 1–40)
BILIRUB SERPL-MCNC: 0.6 MG/DL (ref 0–1.2)
BUN SERPL-MCNC: 17 MG/DL (ref 8–23)
BUN/CREAT SERPL: 12.7 (ref 7–25)
CALCIUM SERPL-MCNC: 9.9 MG/DL (ref 8.6–10.5)
CHLORIDE SERPL-SCNC: 104 MMOL/L (ref 98–107)
CHOLEST SERPL-MCNC: 192 MG/DL (ref 0–200)
CO2 SERPL-SCNC: 27.3 MMOL/L (ref 22–29)
CREAT SERPL-MCNC: 1.34 MG/DL (ref 0.76–1.27)
EGFRCR SERPLBLD CKD-EPI 2021: 56.6 ML/MIN/1.73
GLOBULIN SER CALC-MCNC: 2.3 GM/DL
GLUCOSE SERPL-MCNC: 94 MG/DL (ref 65–99)
HDLC SERPL-MCNC: 39 MG/DL (ref 40–60)
LDLC SERPL CALC-MCNC: 130 MG/DL (ref 0–100)
POTASSIUM SERPL-SCNC: 4.7 MMOL/L (ref 3.5–5.2)
PROT SERPL-MCNC: 7 G/DL (ref 6–8.5)
SODIUM SERPL-SCNC: 141 MMOL/L (ref 136–145)
TRIGL SERPL-MCNC: 126 MG/DL (ref 0–150)
URATE SERPL-MCNC: 7.4 MG/DL (ref 3.4–7)
VLDLC SERPL CALC-MCNC: 23 MG/DL (ref 5–40)

## 2023-08-11 NOTE — PRE-PROCEDURE INSTRUCTIONS
PAT phone history completed with pt for upcoming procedure on  8/15/23 with Dr. Canas.    PAT PASS GIVEN/REVIEWED WITH PT.  VERBALIZED UNDERSTANDING OF THE FOLLOWING:  DO NOT EAT, DRINK, SMOKE, USE SMOKELESS TOBACCO OR CHEW GUM AFTER MIDNIGHT THE NIGHT BEFORE SURGERY.  THIS ALSO INCLUDES HARD CANDIES AND MINTS.    DO NOT SHAVE THE AREA TO BE OPERATED ON AT LEAST 48 HOURS PRIOR TO THE PROCEDURE.  DO NOT WEAR MAKE UP OR NAIL POLISH.  DO NOT LEAVE IN ANY PIERCING OR WEAR JEWELRY THE DAY OF SURGERY.      DO NOT USE ADHESIVES IF YOU WEAR DENTURES.    DO NOT WEAR EYE CONTACTS; BRING IN YOUR GLASSES.    ONLY TAKE MEDICATION THE MORNING OF YOUR PROCEDURE IF INSTRUCTED BY YOUR SURGEON WITH ENOUGH WATER TO SWALLOW THE MEDICATION.  IF YOUR SURGEON DID NOT SPECIFY WHICH MEDICATIONS TO TAKE, YOU WILL NEED TO CALL THEIR OFFICE FOR FURTHER INSTRUCTIONS AND DO AS THEY INSTRUCT.    LEAVE ANYTHING YOU CONSIDER VALUABLE AT HOME.    YOU WILL NEED TO ARRANGE FOR SOMEONE TO DRIVE YOU HOME AFTER SURGERY.  IT IS RECOMMENDED THAT YOU DO NOT DRIVE, WORK, DRINK ALCOHOL OR MAKE MAJOR DECISIONS FOR AT LEAST 24 HOURS AFTER YOUR PROCEDURE IS COMPLETE.      THE DAY OF YOUR PROCEDURE, BRING IN THE FOLLOWING IF APPLICABLE:   PICTURE ID AND INSURANCE/MEDICARE OR MEDICAID CARDS/ANY CO-PAY THAT MAY BE DUE   COPY OF ADVANCED DIRECTIVE/LIVING WILL/POWER OR    CPAP/BIPAP/INHALERS   SKIN PREP SHEET   YOUR PREADMISSION TESTING PASS (IF NOT A PHONE HISTORY)

## 2023-08-15 ENCOUNTER — ANESTHESIA (OUTPATIENT)
Dept: GASTROENTEROLOGY | Facility: HOSPITAL | Age: 73
End: 2023-08-15
Payer: MEDICARE

## 2023-08-15 ENCOUNTER — ANESTHESIA EVENT (OUTPATIENT)
Dept: GASTROENTEROLOGY | Facility: HOSPITAL | Age: 73
End: 2023-08-15
Payer: MEDICARE

## 2023-08-15 ENCOUNTER — HOSPITAL ENCOUNTER (OUTPATIENT)
Facility: HOSPITAL | Age: 73
Setting detail: HOSPITAL OUTPATIENT SURGERY
Discharge: HOME | End: 2023-08-15
Attending: INTERNAL MEDICINE | Admitting: INTERNAL MEDICINE
Payer: MEDICARE

## 2023-08-15 VITALS
SYSTOLIC BLOOD PRESSURE: 134 MMHG | OXYGEN SATURATION: 98 % | WEIGHT: 210 LBS | HEIGHT: 71 IN | TEMPERATURE: 97.2 F | RESPIRATION RATE: 24 BRPM | DIASTOLIC BLOOD PRESSURE: 84 MMHG | HEART RATE: 55 BPM | BODY MASS INDEX: 29.4 KG/M2

## 2023-08-15 DIAGNOSIS — Z86.010 PERSONAL HISTORY OF COLONIC POLYPS: ICD-10-CM

## 2023-08-15 PROCEDURE — 88305 TISSUE EXAM BY PATHOLOGIST: CPT

## 2023-08-15 PROCEDURE — 45385 COLONOSCOPY W/LESION REMOVAL: CPT | Performed by: INTERNAL MEDICINE

## 2023-08-15 PROCEDURE — 25010000002 PROPOFOL 10 MG/ML EMULSION: Performed by: NURSE ANESTHETIST, CERTIFIED REGISTERED

## 2023-08-15 RX ORDER — SODIUM CHLORIDE 9 MG/ML
70 INJECTION, SOLUTION INTRAVENOUS CONTINUOUS PRN
Status: DISCONTINUED | OUTPATIENT
Start: 2023-08-15 | End: 2023-08-15 | Stop reason: HOSPADM

## 2023-08-15 RX ORDER — LIDOCAINE HYDROCHLORIDE 20 MG/ML
INJECTION, SOLUTION INTRAVENOUS AS NEEDED
Status: DISCONTINUED | OUTPATIENT
Start: 2023-08-15 | End: 2023-08-15 | Stop reason: SURG

## 2023-08-15 RX ORDER — SIMETHICONE 20 MG/.3ML
EMULSION ORAL AS NEEDED
Status: DISCONTINUED | OUTPATIENT
Start: 2023-08-15 | End: 2023-08-15 | Stop reason: HOSPADM

## 2023-08-15 RX ORDER — PROPOFOL 10 MG/ML
VIAL (ML) INTRAVENOUS AS NEEDED
Status: DISCONTINUED | OUTPATIENT
Start: 2023-08-15 | End: 2023-08-15 | Stop reason: SURG

## 2023-08-15 RX ADMIN — PROPOFOL 50 MG: 10 INJECTION, EMULSION INTRAVENOUS at 09:49

## 2023-08-15 RX ADMIN — PROPOFOL 200 MCG/KG/MIN: 10 INJECTION, EMULSION INTRAVENOUS at 09:33

## 2023-08-15 RX ADMIN — LIDOCAINE HYDROCHLORIDE 60 MG: 20 INJECTION, SOLUTION INTRAVENOUS at 09:33

## 2023-08-15 RX ADMIN — PROPOFOL 100 MG: 10 INJECTION, EMULSION INTRAVENOUS at 09:33

## 2023-08-15 RX ADMIN — SODIUM CHLORIDE 70 ML/HR: 9 INJECTION, SOLUTION INTRAVENOUS at 08:26

## 2023-08-15 RX ADMIN — PROPOFOL 50 MG: 10 INJECTION, EMULSION INTRAVENOUS at 09:55

## 2023-08-15 NOTE — ANESTHESIA PREPROCEDURE EVALUATION
Anesthesia Evaluation     Patient summary reviewed and Nursing notes reviewed   NPO Solid Status: > 8 hours  NPO Liquid Status: > 2 hours           Airway   Mallampati: II  TM distance: >3 FB  Neck ROM: full  Possible difficult intubation  Dental - normal exam     Pulmonary - normal exam   (+) asthma,sleep apnea on CPAP  (-) not a smoker  Cardiovascular - normal exam    (+) hypertensionhyperlipidemia      Neuro/Psych- negative ROS  GI/Hepatic/Renal/Endo    (+) GERD, renal disease (stage 2 kidney dz), thyroid problem (goiter/ graves dz)     Musculoskeletal     Abdominal    Substance History   (+) alcohol use     OB/GYN negative ob/gyn ROS         Other   arthritis,                   Anesthesia Plan    ASA 3     MAC     (Risks and benefits discussed including risk of aspiration, recall and dental damage. All patient questions answered.    Will continue with plan of care.)  intravenous induction     Anesthetic plan, risks, benefits, and alternatives have been provided, discussed and informed consent has been obtained with: patient.  Pre-procedure education provided  Plan discussed with CRNA.    CODE STATUS:

## 2023-08-15 NOTE — ANESTHESIA POSTPROCEDURE EVALUATION
Patient: Mohsen Bucio    Procedure Summary       Date: 08/15/23 Room / Location: [facility] ENDOSCOPY 2 / [facility] ENDOSCOPY    Anesthesia Start: 0927 Anesthesia Stop: 1027    Procedure: COLONOSCOPY WITH POLYPECTOMY (Anus) Diagnosis:       Personal history of colonic polyps      (Personal history of colonic polyps [Z86.010])    Surgeons: Kennedy Canas MD Provider: Bhanu Aldana CRNA    Anesthesia Type: MAC ASA Status: 3            Anesthesia Type: MAC    Vitals  No vitals data found for the desired time range.          Post Anesthesia Care and Evaluation    Patient location during evaluation: bedside  Patient participation: complete - patient participated  Level of consciousness: awake and alert  Pain score: 0  Pain management: adequate    Airway patency: patent  Anesthetic complications: No anesthetic complications  PONV Status: none  Cardiovascular status: acceptable  Respiratory status: acceptable  Hydration status: acceptable

## 2023-08-15 NOTE — H&P
[facility]  HISTORY AND PHYSICAL    Patient Name: Mohsen Bucio  : 1950  MRN: [id number]    Chief Complaint:   For surveillance colonoscopy    History Of Presenting Illness:    Personal history of colon polyps     Past Medical History:   Diagnosis Date    Allergic 1965    Pollen, weeds, mold, dust    Asthma     Bronchitis     Bursitis     Chest pain     pt denies 25    Colon polyp 2016    colonoscopy     Degenerative disc disease, cervical     GERD (gastroesophageal reflux disease) 2019    Graves disease     Hyperlipidemia     Hypertension     Hyperthyroidism     Myalgia     Myositis     Neuroma of foot     Sleep apnea     Use CPAP machine    Stage 2 chronic kidney disease 2023    Tennis elbow     Urinary tract infection        Past Surgical History:   Procedure Laterality Date    ADENOIDECTOMY  1969    APPENDECTOMY      COLONOSCOPY  2016    INGUINAL HERNIA REPAIR Left     x2 no mesh    TONSILLECTOMY         Social History     Socioeconomic History    Marital status:    Tobacco Use    Smoking status: Never    Smokeless tobacco: Never   Vaping Use    Vaping Use: Never used   Substance and Sexual Activity    Alcohol use: Yes     Alcohol/week: 5.0 standard drinks     Types: 5 Drinks containing 0.5 oz of alcohol per week     Comment: occ    Drug use: Never    Sexual activity: Yes     Partners: Female     Birth control/protection: Post-menopausal       Family History   Problem Relation Age of Onset    Benign prostatic hyperplasia Father     Stroke Father     Cancer Father         lung    Hyperlipidemia Father     Colon cancer Paternal Grandfather         diagnosed mid 70's    Stroke Other     Cancer Other        Prior to Admission Medications:  Medications Prior to Admission   Medication Sig Dispense Refill Last Dose    Bempedoic Acid 180 MG tablet Take 1 tablet by mouth Daily. 90 tablet 3 2023 at 0800    Breo Ellipta 100-25 MCG/INH inhaler  Inhale 1 puff Daily.   8/14/2023 at 0800    Cyanocobalamin 2500 MCG sublingual tablet Daily 90 each 3 8/14/2023 at 0800    lisinopril (PRINIVIL,ZESTRIL) 2.5 MG tablet Take 1 tablet by mouth Daily.   8/14/2023 at 0800    sodium-potassium-magnesium sulfates (Suprep Bowel Prep Kit) 17.5-3.13-1.6 GM/177ML solution oral solution Use as directed for colonoscopy prep. Patient has instructions. 177 mL 0 8/15/2023 at 0500    albuterol 108 (90 Base) MCG/ACT inhaler Inhale 2 puffs Every 4 (Four) Hours As Needed for Wheezing.   Unknown    desloratadine (CLARINEX) 5 MG tablet Take 1 tablet by mouth Daily As Needed.   Unknown    famotidine (Pepcid) 20 MG tablet Take 1 tablet by mouth 2 (Two) Times a Day As Needed for Heartburn. 180 tablet 3 Unknown       Allergies:  Allergies   Allergen Reactions    Atorvastatin Other (See Comments)     Muscle cramps    Pitavastatin Other (See Comments)     Muscle cramps    Pravastatin Other (See Comments)     Muscle cramps    Rosuvastatin Other (See Comments)     Muscle cramps    Simvastatin Other (See Comments)     Muscle cramps    Zetia [Ezetimibe] Myalgia    Penicillins Other (See Comments)     Allergy testing years ago showed patient allergic        Vitals: Temp:  [97.1 øF (36.2 øC)] 97.1 øF (36.2 øC)  Heart Rate:  [76] 76  Resp:  [18] 18  BP: (145)/(84) 145/84    Review Of Systems:  Constitutional:  Negative for chills, fever, and unexpected weight change.  Respiratory:  Negative for cough, chest tightness, shortness of breath, and wheezing.  Cardiovascular:  Negative for chest pain, palpitations, and leg swelling.  Gastrointestinal:  Negative for abdominal distention, abdominal pain, nausea, vomiting.  Neurological:  Negative for weakness, numbness, and headaches.     Physical Exam:    General Appearance:  Alert, cooperative, in no acute distress.   Lungs:   Clear to auscultation, respirations regular, even and                 unlabored.   Heart:  Regular rhythm and normal rate.   Abdomen:    Normal bowel sounds, no masses, no organomegaly. Soft, nontender, nondistended   Neurologic: Alert and oriented x 3. Moves all four limbs equally       Assessment & Plan     Assessment:  Principal Problem:    Personal history of colonic polyps      Plan: COLONOSCOPY (N/A)     Kennedy Canas MD  8/15/2023

## 2023-08-15 NOTE — DISCHARGE INSTRUCTIONS
Rest today  No pushing,pulling,tugging,heavy lifting, or strenuous activity   No major decision making,driving,or drinking alcoholic beverages for 24 hours due to the sedation you received  Always use good hand hygiene/washing technique  No driving on pain medication.    To assist you in voiding:  Drink plenty of fluids  Listen to running water while attempting to void.    If you are unable to urinate and you have an uncomfortable urge to void or it has been   6 hours since you were discharged, return to the Emergency Room.    - Discharge patient to home (ambulatory).   - High fiber diet.   - Continue present medications.   - Await pathology results.   - Repeat colonoscopy in 1 year for surveillance pending path.   - Return to GI office in 8 weeks.

## 2023-08-16 LAB — REF LAB TEST METHOD: NORMAL

## 2023-09-25 ENCOUNTER — OFFICE VISIT (OUTPATIENT)
Dept: CARDIOLOGY | Facility: CLINIC | Age: 73
End: 2023-09-25

## 2023-09-25 VITALS
DIASTOLIC BLOOD PRESSURE: 68 MMHG | HEART RATE: 76 BPM | BODY MASS INDEX: 29.26 KG/M2 | HEIGHT: 71 IN | OXYGEN SATURATION: 97 % | RESPIRATION RATE: 18 BRPM | WEIGHT: 209 LBS | SYSTOLIC BLOOD PRESSURE: 124 MMHG

## 2023-09-25 DIAGNOSIS — R00.2 PALPITATIONS: Primary | ICD-10-CM

## 2023-09-25 DIAGNOSIS — I10 PRIMARY HYPERTENSION: ICD-10-CM

## 2023-09-25 DIAGNOSIS — E78.2 MIXED HYPERLIPIDEMIA: ICD-10-CM

## 2023-09-25 DIAGNOSIS — N18.2 STAGE 2 CHRONIC KIDNEY DISEASE: ICD-10-CM

## 2023-09-25 PROCEDURE — 3074F SYST BP LT 130 MM HG: CPT | Performed by: INTERNAL MEDICINE

## 2023-09-25 PROCEDURE — 3078F DIAST BP <80 MM HG: CPT | Performed by: INTERNAL MEDICINE

## 2023-09-25 PROCEDURE — 99213 OFFICE O/P EST LOW 20 MIN: CPT | Performed by: INTERNAL MEDICINE

## 2023-09-25 RX ORDER — LISINOPRIL 10 MG/1
10 TABLET ORAL DAILY
COMMUNITY

## 2023-09-25 NOTE — PROGRESS NOTES
[facility] Follow Up Note    Mohsen Bucio  [id number]  2023    Primary Care Provider: Ronel Hyatt APRN    Chief Complaint: Routine follow-up    History of Present Illness:   Mr. Mohsen Bucio is a 73y.o. male who presents to the Cardiology Clinic for routine follow-up. The patient has a past medical history significant for hypertension, asthma, chronic kidney disease, and Graves' disease.  He does not have any significant past cardiac history.  He initially presented the cardiology clinic for evaluation of occasional palpitations.  A subsequent 14-day outpatient cardiac monitor in  showed occasional supraventricular and ventricular ectopy, which was asymptomatic.   Due to recurrent palpitations, he underwent repeat cardiac monitoring in  with no significant sustained arrhythmias.  He presents today for routine follow-up.  Since his last appointment, the patient reports he has been doing well from a cardiac standpoint.  He has had interval resolution of his palpitations.  He remains active, without chest pain or exertional angina.  No specific complaints today.    Past Cardiac Testin. Last Coronary Angio: None  2. Prior Stress Testin, reportedly unremarkable  3. Last Echo:               1.  Normal left ventricular size and systolic function, LVEF 60-65%.              2.  Grade 1 diastolic dysfunction.              3.  Mild concentric LVH.              4.  Normal left atrial volume index              5.  Normal right ventricular size and systolic function.              6.  No significant valvular abnormalities.  4. Prior Holter Monitor:               1.  The predominant rhythm is sinus rhythm with an average heart rate 74 bpm.              2.  Normal atrioventricular conduction.              3.  No sustained arrhythmias.              4.  Rare ventricular ectopy including limited NSVT.              5.  One 8.6-second run  "of tachycardia, cannot rule out brief nonsustained atrial fibrillation.              6.  Symptom diary submitted with 7 symptomatic episodes including \"fluttering and lightheadedness\" corresponding to NSR.    Review of Systems:   Review of Systems   Constitutional:  Negative for activity change, appetite change, chills, diaphoresis, fatigue, fever, unexpected weight gain and unexpected weight loss.   Eyes:  Negative for blurred vision and double vision.   Respiratory:  Negative for cough, chest tightness, shortness of breath and wheezing.    Cardiovascular:  Negative for chest pain, palpitations and leg swelling.   Gastrointestinal:  Negative for abdominal pain, anal bleeding, blood in stool and GERD.   Endocrine: Negative for cold intolerance and heat intolerance.   Genitourinary:  Negative for hematuria.   Neurological:  Negative for dizziness, syncope, weakness and light-headedness.   Hematological:  Does not bruise/bleed easily.   Psychiatric/Behavioral:  Negative for depressed mood and stress. The patient is not nervous/anxious.      I have reviewed and/or updated the patient's past medical, past surgical, family, social history, problem list and allergies as appropriate.     Medications:     Current Outpatient Medications:     albuterol 108 (90 Base) MCG/ACT inhaler, Inhale 2 puffs Every 4 (Four) Hours As Needed for Wheezing., Disp: , Rfl:     Bempedoic Acid 180 MG tablet, Take 1 tablet by mouth Daily., Disp: 90 tablet, Rfl: 3    Breo Ellipta 100-25 MCG/INH inhaler, Inhale 1 puff Daily., Disp: , Rfl:     Cyanocobalamin 2500 MCG sublingual tablet, Daily, Disp: 90 each, Rfl: 3    desloratadine (CLARINEX) 5 MG tablet, Take 1 tablet by mouth Daily As Needed., Disp: , Rfl:     famotidine (Pepcid) 20 MG tablet, Take 1 tablet by mouth 2 (Two) Times a Day As Needed for Heartburn., Disp: 180 tablet, Rfl: 3    lisinopril (PRINIVIL,ZESTRIL) 10 MG tablet, Take 1 tablet by mouth Daily., Disp: , Rfl:     Physical " "Exam:  Vital Signs:   Vitals:    09/25/23 1153   BP: 124/68   BP Location: Right arm   Patient Position: Sitting   Pulse: 76   Resp: 18   SpO2: 97%   Weight: 94.8 kg (209 lb)   Height: 180.3 cm (71\")       Physical Exam  Constitutional:       General: He is not in acute distress.     Appearance: Normal appearance. He is not diaphoretic.   HENT:      Head: Normocephalic and atraumatic.   Cardiovascular:      Rate and Rhythm: Normal rate and regular rhythm.      Heart sounds: No murmur heard.  Pulmonary:      Effort: Pulmonary effort is normal. No respiratory distress.      Breath sounds: Normal breath sounds. No stridor. No wheezing, rhonchi or rales.   Abdominal:      General: Bowel sounds are normal. There is no distension.      Palpations: Abdomen is soft.      Tenderness: There is no abdominal tenderness. There is no guarding or rebound.   Musculoskeletal:         General: No swelling. Normal range of motion.      Cervical back: Neck supple. No tenderness.   Skin:     General: Skin is warm and dry.   Neurological:      General: No focal deficit present.      Mental Status: He is alert and oriented to person, place, and time.   Psychiatric:         Mood and Affect: Mood normal.         Behavior: Behavior normal.       Results Review:   I reviewed the patient's new clinical results.  I personally viewed and interpreted the patient's EKG/Telemetry data        Assessment / Plan:     1. Palpitations  -- No recent episodes of palpitations  -- Last outpatient cardiac monitoring did not show any sustained arrhythmias.  One 8-second episode of likely supraventricular tachycardia (cannot fully rule out brief PAF) which was asymptomatic  -- Given resolution of palpitations, no further work-up required at this time  -- If recurrent palpitations, would consider repeat outpatient cardiac monitoring to reevaluate for PAF  --Follow-up on an as-needed basis     2.  Hyperlipidemia  --History of intolerance to multiple statins  -- " Last lipid profile in 6/23 showed , HDL 39, triglycerides 126  --If NexoBrid profile shows upward trending LDL, would consider reinitiation of Repatha     3.  Essential hypertension  -- BP adequately controlled with lisinopril     4.  Chronic kidney disease, stage II  -- Creatinine 1.3 on last labs in 6/23    Follow Up:   Return if symptoms worsen or fail to improve.      Thank you for allowing me to participate in the care of your patient. Please to not hesitate to contact me with additional questions or concerns.     CARLOZ Hodge MD  Interventional Cardiology   09/25/2023  11:43 EDT

## 2023-11-17 ENCOUNTER — OFFICE VISIT (OUTPATIENT)
Dept: INTERNAL MEDICINE | Facility: CLINIC | Age: 73
End: 2023-11-17
Payer: MEDICARE

## 2023-11-17 VITALS
WEIGHT: 211 LBS | DIASTOLIC BLOOD PRESSURE: 84 MMHG | BODY MASS INDEX: 29.54 KG/M2 | HEART RATE: 77 BPM | SYSTOLIC BLOOD PRESSURE: 134 MMHG | OXYGEN SATURATION: 98 % | HEIGHT: 71 IN | TEMPERATURE: 98.1 F

## 2023-11-17 DIAGNOSIS — I10 PRIMARY HYPERTENSION: ICD-10-CM

## 2023-11-17 DIAGNOSIS — M79.605 PAIN IN BOTH LOWER EXTREMITIES: Primary | ICD-10-CM

## 2023-11-17 DIAGNOSIS — K21.9 GASTROESOPHAGEAL REFLUX DISEASE, UNSPECIFIED WHETHER ESOPHAGITIS PRESENT: ICD-10-CM

## 2023-11-17 DIAGNOSIS — R63.5 WEIGHT GAIN: ICD-10-CM

## 2023-11-17 DIAGNOSIS — M79.604 PAIN IN BOTH LOWER EXTREMITIES: Primary | ICD-10-CM

## 2023-11-17 DIAGNOSIS — Z13.220 SCREENING FOR LIPID DISORDERS: ICD-10-CM

## 2023-11-17 DIAGNOSIS — Z13.228 SCREENING FOR ENDOCRINE, METABOLIC AND IMMUNITY DISORDER: ICD-10-CM

## 2023-11-17 DIAGNOSIS — L29.9 ITCHING: ICD-10-CM

## 2023-11-17 DIAGNOSIS — R20.2 TINGLING: ICD-10-CM

## 2023-11-17 DIAGNOSIS — Z13.29 SCREENING FOR ENDOCRINE, METABOLIC AND IMMUNITY DISORDER: ICD-10-CM

## 2023-11-17 DIAGNOSIS — Z13.0 SCREENING FOR ENDOCRINE, METABOLIC AND IMMUNITY DISORDER: ICD-10-CM

## 2023-11-17 DIAGNOSIS — R29.898 LEG HEAVINESS: ICD-10-CM

## 2023-11-17 PROCEDURE — 99214 OFFICE O/P EST MOD 30 MIN: CPT | Performed by: NURSE PRACTITIONER

## 2023-11-17 PROCEDURE — 1159F MED LIST DOCD IN RCRD: CPT | Performed by: NURSE PRACTITIONER

## 2023-11-17 PROCEDURE — 3079F DIAST BP 80-89 MM HG: CPT | Performed by: NURSE PRACTITIONER

## 2023-11-17 PROCEDURE — 3075F SYST BP GE 130 - 139MM HG: CPT | Performed by: NURSE PRACTITIONER

## 2023-11-17 PROCEDURE — 1160F RVW MEDS BY RX/DR IN RCRD: CPT | Performed by: NURSE PRACTITIONER

## 2023-11-17 RX ORDER — LISINOPRIL 5 MG/1
5 TABLET ORAL DAILY
Qty: 90 TABLET | Refills: 3 | Status: SHIPPED | OUTPATIENT
Start: 2023-11-17

## 2023-11-17 RX ORDER — FAMOTIDINE 40 MG/1
40 TABLET, FILM COATED ORAL DAILY
Qty: 90 TABLET | Refills: 1 | Status: SHIPPED | OUTPATIENT
Start: 2023-11-17

## 2023-11-17 RX ORDER — FLUTICASONE PROPIONATE 50 MCG
2 SPRAY, SUSPENSION (ML) NASAL DAILY
Qty: 16 G | Refills: 3 | Status: SHIPPED | OUTPATIENT
Start: 2023-11-17

## 2023-11-17 NOTE — PROGRESS NOTES
Office Visit      Patient Name: Mohsen Bucio  : 1950   MRN: [id number]     Chief Complaint:    Chief Complaint   Patient presents with    Hypertension       History of Present Illness: Mohsen Bucio is a 73 y.o. male who is here today for evaluation of multiple medical concerns.    Subjective     The patient did not take lisinopril or Nexletol in the second quarter of the year and felt okay, but his blood pressure seemed to acting strange, therefore he took half of a pill of lisinopril 5 mg and thinks he needs a new prescription for that. He began taking Nexletol approximately the same time, but he has stopped since 10/2023. A couple of weeks later he started taking Nexletol, but began experiencing muscle pain in his legs and also it caused constipation. He stopped taking Nexletol a month ago and has been feeling okay since then. He denies any palpitations. He saw Dr. Hodge in mid summer who informed him not to come back unless he has problems.    His reflux has been bothering him and he needs a prescription for famotidine. He has started eating smaller meals. He experiences a cough and occasionally at night he gets heartburn. He took omeprazole for almost a year and then he had a blood test and his kidney number was in the 50's range. He read the label on omeprazole that it could be bad for his kidneys, therefoe he stopped taking it and a couple of months later his kidney number was back up in the 60's range. He has not been taking Pepcid.    He had a colonoscopy completed in 2023 and had 17 polyps. He had not had one in several years. He has a follow-up appointment after . He denies any bowel habit changes since he stopped taking Nexletol.    The patient had eczema all his young life. This spring his hands started breaking out periodically and it itches. Over the last few months, his arms and knees have began breaking out. He has not h this issue in 30 to 40 years. He sees   Grace Rick at [facility] for his scabies, who prescribed expensive topical creams.    When he walks, his legs hurt all over. When he retired in 2009, he determined that he was going to walk a lot and his legs always helped him back. This summer, his legs felt strong. A few months ago, his legs started hurting and he would wake up and they would be sore, but as he walked around, it got better. In the last month, it has been very painful. He denies any cramps or sensitivity to touch. It is mostly a muscle ache. Sometimes it feels like it is tingling a little bit, but it is not really. A week ago, he stopped walking and started doing the exercise bike and after 2 days, the pain went away. About a week before that, he had stopped walking for several days and then he went for a mile and his legs hurt after that and that is when he switched to the cycle. He denies any swelling in his legs. He has noticed that his veins are sticking out. He takes cyanocobalamin every other day. He denies any dark urine. His legs feel heavy. He denies any lower back pain. He played tennis a couple of times and his legs hurt after that.    In late 08/2023, his weight went up a couple of pounds. It stayed there, but later in 09/2023, it went up again. He has gained 8 pounds since 08/2023. He denies any hair loss or feeling a fullness in his throat. He was treated for thyroid problem in the past. He was taking levothyroxine and methimazole for years. He was going to an endocrinologist in [place], but when he started going to Dr. Allan, he was told that he does not need it. He tested his thyroid a couple of times and he had ultrasounds. Since then, he has not been treated for anything except for some nodules.    He had COVID-19 in 05/2023.    The patient denies any family history of autoimmune disorders.      I have reviewed and the following portions of the patient's history were updated as appropriate: past family history, past  "medical history, past social history, past surgical history and problem list.      Current Outpatient Medications:     lisinopril (PRINIVIL,ZESTRIL) 5 MG tablet, Take 1 tablet by mouth Daily., Disp: 90 tablet, Rfl: 3    albuterol 108 (90 Base) MCG/ACT inhaler, Inhale 2 puffs Every 4 (Four) Hours As Needed for Wheezing., Disp: , Rfl:     Bempedoic Acid 180 MG tablet, Take 1 tablet by mouth Daily., Disp: 90 tablet, Rfl: 3    Breo Ellipta 100-25 MCG/INH inhaler, Inhale 1 puff Daily., Disp: , Rfl:     Cyanocobalamin 2500 MCG sublingual tablet, Daily, Disp: 90 each, Rfl: 3    desloratadine (CLARINEX) 5 MG tablet, Take 1 tablet by mouth Daily As Needed., Disp: , Rfl:     famotidine (PEPCID) 40 MG tablet, Take 1 tablet by mouth Daily., Disp: 90 tablet, Rfl: 1    fluticasone (FLONASE) 50 MCG/ACT nasal spray, 2 sprays into the nostril(s) as directed by provider Daily., Disp: 16 g, Rfl: 3    sodium-potassium-magnesium sulfates (Suprep Bowel Prep Kit) 17.5-3.13-1.6 GM/177ML solution oral solution, Use as directed for colonoscopy prep. Patient has instructions., Disp: 177 mL, Rfl: 0    Allergies   Allergen Reactions    Atorvastatin Other (See Comments)     Muscle cramps    Pitavastatin Other (See Comments)     Muscle cramps    Pravastatin Other (See Comments)     Muscle cramps    Rosuvastatin Other (See Comments)     Muscle cramps    Simvastatin Other (See Comments)     Muscle cramps    Zetia [Ezetimibe] Myalgia    Penicillins Other (See Comments)     Allergy testing years ago showed patient allergic       Objective     Physical Exam:  Vital Signs:   Vitals:    11/17/23 0840   BP: 134/84   Pulse: 77   Temp: 98.1 °F (36.7 °C)   SpO2: 98%   Weight: 95.7 kg (211 lb)   Height: 180.3 cm (70.98\")     Body mass index is 29.44 kg/m².  BMI is >= 25 and <30. (Overweight) The following options were offered after discussion;: exercise counseling/recommendations and nutrition counseling/recommendations       Physical Exam  Constitutional:  "      Appearance: He is not ill-appearing.   HENT:      Head: Normocephalic.      Right Ear: External ear normal.      Left Ear: External ear normal.   Eyes:      Conjunctiva/sclera: Conjunctivae normal.      Pupils: Pupils are equal, round, and reactive to light.   Cardiovascular:      Rate and Rhythm: Normal rate and regular rhythm.      Pulses:           Radial pulses are 2+ on the right side and 2+ on the left side.        Dorsalis pedis pulses are 2+ on the right side and 2+ on the left side.      Heart sounds: Normal heart sounds.   Pulmonary:      Effort: Pulmonary effort is normal.      Breath sounds: Normal breath sounds.   Musculoskeletal:      Cervical back: Normal range of motion and neck supple.   Skin:     General: Skin is warm.      Capillary Refill: Capillary refill takes less than 2 seconds.   Neurological:      Mental Status: He is alert and oriented to person, place, and time.      Coordination: Coordination normal.      Gait: Gait normal.   Psychiatric:         Attention and Perception: Attention normal.         Mood and Affect: Mood and affect normal.         Speech: Speech normal.         Behavior: Behavior normal.             Assessment / Plan      Assessment/Plan:   Diagnoses and all orders for this visit:    1. Pain in both lower extremities (Primary)  -     Comprehensive Metabolic Panel  -     Magnesium  -     Vitamin B12  -     Vitamin B6  -     CK-MB  -     Cancel: BEATA    2. Leg heaviness  -     TSH  -     Thyroid Antibodies  -     T4, free  -     CK-MB  -     Cancel: BEATA    3. Tingling  -     Vitamin B12  -     Vitamin B6  -     Cancel: BEATA    4. Weight gain  -     TSH  -     Thyroid Antibodies  -     T4, free    5. Itching  -     BEATA    6. Screening for lipid disorders  -     Lipid Panel    7. Screening for endocrine, metabolic and immunity disorder  -     Comprehensive Metabolic Panel  -     Magnesium  -     TSH  -     Thyroid Antibodies  -     T4, free    8. Primary hypertension  -      lisinopril (PRINIVIL,ZESTRIL) 5 MG tablet; Take 1 tablet by mouth Daily.  Dispense: 90 tablet; Refill: 3        - Follow heart healthy diet.  Keep sodium intake < 1500 mg per day.  Avoid processed & fast foods.          - Exercise as tolerated, with a goal of 30 minutes of moderate exercise most days.         - Take medications as prescribed.    9. Gastroesophageal reflux disease, unspecified whether esophagitis present  -     famotidine (PEPCID) 40 MG tablet; Take 1 tablet by mouth Daily.  Dispense: 90 tablet; Refill: 1        - Avoid triggers such as spicy or greasy food, alcohol, chocolate, caffeine, carbonated beverages, tomatoes and tomato sauces, onions, smoking        - May raise HOB 30 degrees with risers or blocks, if desired        - Do not eat within 2-3 hours of lying down        - Avoid clothing, belts that constrict midsection        - Losing weight may reduce symptoms    Other orders  -     fluticasone (FLONASE) 50 MCG/ACT nasal spray; 2 sprays into the nostril(s) as directed by provider Daily.  Dispense: 16 g; Refill: 3  -     BEATA         Plan:    1. Hypertension.  A prescription was sent for lisinopril 5 mg tablets.    2. Acid reflux.  A prescription for famotidine was provided.    3. Leg pain.  The patient raises concern regarding the feelings in his legs might be due to his thyroid. I will check his magnesium, vitamin B6, electrolytes, liver function, lipids, thyroid antibodies, CK and MB. If nothing shows up abnormal on these laboratory results, I would like to get an JEANINE.    4. Weight gain.  I will check thyroid antibodies. If anything shows up on his laboratory results, I did order a thyroid antibodies. If those are elevated, we will go ahead and check that thyroid ultrasound again.    Follow-up  The patient will follow up in 4 months for his annual visit.    Follow Up:   Return in about 4 months (around 3/17/2024) for Medicare Wellness.    Patient was given instructions and counseling  regarding his condition or for health maintenance advice. Please see specific information pulled into the AVS if appropriate.       Primary Care [facility]     Please note that portions of this note may have been completed with a voice recognition program. Efforts were made to edit dictation, but occasionally words are mistranscribed.           Transcribed from ambient dictation for LUCITA Lincoln by Sarah Soria.  11/17/23   12:53 EST    Patient or patient representative verbalized consent to the visit recording.  I have personally performed the services described in this document as transcribed by the above individual, and it is both accurate and complete.

## 2023-11-20 LAB
ALBUMIN SERPL-MCNC: 4.9 G/DL (ref 3.5–5.2)
ALBUMIN/GLOB SERPL: 2 G/DL
ALP SERPL-CCNC: 42 U/L (ref 39–117)
ALT SERPL-CCNC: 29 U/L (ref 1–41)
ANA SER QL: NEGATIVE
AST SERPL-CCNC: 21 U/L (ref 1–40)
BILIRUB SERPL-MCNC: 0.8 MG/DL (ref 0–1.2)
BUN SERPL-MCNC: 15 MG/DL (ref 8–23)
BUN/CREAT SERPL: 15.5 (ref 7–25)
CALCIUM SERPL-MCNC: 10.3 MG/DL (ref 8.6–10.5)
CHLORIDE SERPL-SCNC: 101 MMOL/L (ref 98–107)
CHOLEST SERPL-MCNC: 228 MG/DL (ref 0–200)
CK MB SERPL-MCNC: 2.86 NG/ML
CO2 SERPL-SCNC: 26.2 MMOL/L (ref 22–29)
CREAT SERPL-MCNC: 0.97 MG/DL (ref 0.76–1.27)
EGFRCR SERPLBLD CKD-EPI 2021: 83.5 ML/MIN/1.73
GLOBULIN SER CALC-MCNC: 2.5 GM/DL
GLUCOSE SERPL-MCNC: 98 MG/DL (ref 65–99)
HDLC SERPL-MCNC: 39 MG/DL (ref 40–60)
LDLC SERPL CALC-MCNC: 165 MG/DL (ref 0–100)
MAGNESIUM SERPL-MCNC: 2.4 MG/DL (ref 1.6–2.4)
POTASSIUM SERPL-SCNC: 4.2 MMOL/L (ref 3.5–5.2)
PROT SERPL-MCNC: 7.4 G/DL (ref 6–8.5)
PYRIDOXAL PHOS SERPL-MCNC: 17.3 UG/L (ref 3.4–65.2)
SODIUM SERPL-SCNC: 141 MMOL/L (ref 136–145)
T4 FREE SERPL-MCNC: 1.18 NG/DL (ref 0.93–1.7)
THYROGLOB AB SERPL-ACNC: <1 IU/ML (ref 0–0.9)
THYROPEROXIDASE AB SERPL-ACNC: 15 IU/ML (ref 0–34)
TRIGL SERPL-MCNC: 129 MG/DL (ref 0–150)
TSH SERPL DL<=0.005 MIU/L-ACNC: 0.4 UIU/ML (ref 0.27–4.2)
VIT B12 SERPL-MCNC: 650 PG/ML (ref 211–946)
VLDLC SERPL CALC-MCNC: 24 MG/DL (ref 5–40)

## 2023-11-21 NOTE — PROGRESS NOTES
Total cholesterol, LDL cholesterol continue to be elevated.  HDL cholesterol just below normal limits.  Recommend taking OTC CholestOff, omega 3 fatty acid supplement and continue heart healthy diet, daily physical activity.    Otherwise, labs are normal.

## 2023-11-27 ENCOUNTER — OFFICE VISIT (OUTPATIENT)
Dept: GASTROENTEROLOGY | Facility: CLINIC | Age: 73
End: 2023-11-27
Payer: MEDICARE

## 2023-11-27 VITALS
HEART RATE: 62 BPM | RESPIRATION RATE: 18 BRPM | BODY MASS INDEX: 30.24 KG/M2 | HEIGHT: 71 IN | OXYGEN SATURATION: 95 % | WEIGHT: 216 LBS | DIASTOLIC BLOOD PRESSURE: 84 MMHG | SYSTOLIC BLOOD PRESSURE: 124 MMHG

## 2023-11-27 DIAGNOSIS — D12.6 ADENOMATOUS POLYP OF COLON, UNSPECIFIED PART OF COLON: Primary | ICD-10-CM

## 2023-11-27 DIAGNOSIS — K21.9 GASTROESOPHAGEAL REFLUX DISEASE WITHOUT ESOPHAGITIS: Chronic | ICD-10-CM

## 2023-11-27 PROCEDURE — 1159F MED LIST DOCD IN RCRD: CPT | Performed by: NURSE PRACTITIONER

## 2023-11-27 PROCEDURE — 99213 OFFICE O/P EST LOW 20 MIN: CPT | Performed by: NURSE PRACTITIONER

## 2023-11-27 PROCEDURE — 1160F RVW MEDS BY RX/DR IN RCRD: CPT | Performed by: NURSE PRACTITIONER

## 2023-11-27 PROCEDURE — 3074F SYST BP LT 130 MM HG: CPT | Performed by: NURSE PRACTITIONER

## 2023-11-27 PROCEDURE — 3079F DIAST BP 80-89 MM HG: CPT | Performed by: NURSE PRACTITIONER

## 2023-11-27 RX ORDER — SODIUM CHLORIDE 9 MG/ML
70 INJECTION, SOLUTION INTRAVENOUS CONTINUOUS PRN
OUTPATIENT
Start: 2023-11-27

## 2023-11-27 RX ORDER — SODIUM, POTASSIUM,MAG SULFATES 17.5-3.13G
SOLUTION, RECONSTITUTED, ORAL ORAL
Qty: 177 ML | Refills: 0 | Status: SHIPPED | OUTPATIENT
Start: 2023-11-27

## 2023-11-27 NOTE — PATIENT INSTRUCTIONS
Antireflux measures: Avoid fried, fatty foods, alcohol, chocolate, coffee, tea,  soft drinks, all carbonated beverages (including sparkling water), peppermint and spearmint, spicy foods, tomatoes and tomato based foods, onion based foods, and garlic.   Other antireflux measures include weight reduction if overweight, avoiding tight clothing around the abdomen, elevating the head of the bed 6 inches with blocks under the head board, and don't drink or eat before going to bed and avoid lying down immediately after meals.  Pepcid 40 mg daily at bedtime.   Continue to avoid vaping/smoking.   High fiber, low fat diet with liberal water intake.   Colonoscopy for surveillance in 1 year, August 2024.  Patient declines EGD at this time. He will call back if he wants to schedule.   Colonoscopy: The indications, technique, alternatives and potential risk and complications were discussed with the patient including but not limited to bleeding, perforations, missing lesions and anesthetic complications. The patient understands and wishes to proceed with the procedure and has given their verbal consent. Written patient education information was given to the patient.   The patient will call if they have further questions before procedure.

## 2023-11-27 NOTE — PROGRESS NOTES
Follow Up Note     Date: 2023   Patient Name: Mohsen Bucio  MRN: [id number]  : 1950     Primary Care Provider: Ronel Hyatt APRN     Chief Complaint   Patient presents with    Follow-up     History of present illness:   2023  Mohsen Bucio is a 73 y.o. male who is here today for follow up after colonoscopy. He did not have any problems after his procedure. Reflux well controlled. No difficulty swallowing. Denies any GI bleeding.     Interval History:  2023  The patient denies recent change in bowel habits. There is no diarrhea or constipation. There is no history of abdominal pain. There is no history of overt GI bleed (hematemesis melena or hematochezia). The patient denies nausea or vomiting. There is a history of occasional reflux that is reasonably controlled with Pepcid 40 mg as needed. The patient denies dysphagia or odynophagia. There is no history of recent significant weight loss. There is no history of liver disease in the past. There is a family history of colon cancer in his grandfather diagnosed in his mid 70's. No other family history of GI malignancy. The patient's last colonoscopy was in  by Dr. Santamaria with polyp removed.      Subjective      Past Medical History:   Diagnosis Date    Allergic 1965    Pollen, weeds, mold, dust    Asthma     Bronchitis     Bursitis     Chest pain     pt denies 25    Colon polyp 2016    colonoscopy 2016    Degenerative disc disease, cervical     GERD (gastroesophageal reflux disease) 2019    Graves disease     Hyperlipidemia     Hypertension     Hyperthyroidism     Myalgia     Myositis     Neuroma of foot     Sleep apnea     Use CPAP machine    Stage 2 chronic kidney disease 2023    Tennis elbow     Urinary tract infection      Past Surgical History:   Procedure Laterality Date    ADENOIDECTOMY  1969    APPENDECTOMY      COLONOSCOPY  2016    COLONOSCOPY N/A 8/15/2023    Procedure:  COLONOSCOPY WITH POLYPECTOMY;  Surgeon: Kennedy Canas MD;  Location: [facility] ENDOSCOPY;  Service: Gastroenterology;  Laterality: N/A;    INGUINAL HERNIA REPAIR Left     x2 no mesh    TONSILLECTOMY       Family History   Problem Relation Age of Onset    Benign prostatic hyperplasia Father     Stroke Father     Cancer Father         lung    Hyperlipidemia Father     Colon cancer Paternal Grandfather         diagnosed mid 70's    Stroke Other     Cancer Other      Social History     Socioeconomic History    Marital status:    Tobacco Use    Smoking status: Never     Passive exposure: Never    Smokeless tobacco: Never   Vaping Use    Vaping Use: Never used   Substance and Sexual Activity    Alcohol use: Yes     Alcohol/week: 5.0 standard drinks of alcohol     Types: 5 Drinks containing 0.5 oz of alcohol per week     Comment: occ    Drug use: Never    Sexual activity: Yes     Partners: Female     Birth control/protection: Post-menopausal       Current Outpatient Medications:     albuterol 108 (90 Base) MCG/ACT inhaler, Inhale 2 puffs Every 4 (Four) Hours As Needed for Wheezing., Disp: , Rfl:     Bempedoic Acid 180 MG tablet, Take 1 tablet by mouth Daily., Disp: 90 tablet, Rfl: 3    Breo Ellipta 100-25 MCG/INH inhaler, Inhale 1 puff Daily., Disp: , Rfl:     Cyanocobalamin 2500 MCG sublingual tablet, Daily, Disp: 90 each, Rfl: 3    desloratadine (CLARINEX) 5 MG tablet, Take 1 tablet by mouth Daily As Needed., Disp: , Rfl:     famotidine (PEPCID) 40 MG tablet, Take 1 tablet by mouth Daily., Disp: 90 tablet, Rfl: 1    fluticasone (FLONASE) 50 MCG/ACT nasal spray, 2 sprays into the nostril(s) as directed by provider Daily., Disp: 16 g, Rfl: 3    lisinopril (PRINIVIL,ZESTRIL) 5 MG tablet, Take 1 tablet by mouth Daily., Disp: 90 tablet, Rfl: 3    sodium-potassium-magnesium sulfates (Suprep Bowel Prep Kit) 17.5-3.13-1.6 GM/177ML solution oral solution, Use as directed for colonoscopy prep. Patient has  "instructions., Disp: 177 mL, Rfl: 0    Allergies   Allergen Reactions    Atorvastatin Other (See Comments)     Muscle cramps    Pitavastatin Other (See Comments)     Muscle cramps    Pravastatin Other (See Comments)     Muscle cramps    Rosuvastatin Other (See Comments)     Muscle cramps    Simvastatin Other (See Comments)     Muscle cramps    Zetia [Ezetimibe] Myalgia    Penicillins Other (See Comments)     Allergy testing years ago showed patient allergic     The following portions of the patient's history were reviewed and updated as appropriate: allergies, current medications, past family history, past medical history, past social history, past surgical history and problem list.  Objective     Physical Exam  Vitals and nursing note reviewed.   Constitutional:       General: He is not in acute distress.     Appearance: Normal appearance. He is well-developed.   HENT:      Head: Normocephalic and atraumatic.      Mouth/Throat:      Mouth: Mucous membranes are not pale, not dry and not cyanotic.   Eyes:      General: Lids are normal.   Neck:      Trachea: Trachea normal.   Cardiovascular:      Rate and Rhythm: Normal rate.   Pulmonary:      Effort: Pulmonary effort is normal. No respiratory distress.      Breath sounds: Normal breath sounds.   Abdominal:      Tenderness: There is no abdominal tenderness.   Skin:     General: Skin is warm and dry.   Neurological:      Mental Status: He is alert and oriented to person, place, and time.   Psychiatric:         Mood and Affect: Mood normal.         Speech: Speech normal.         Behavior: Behavior normal. Behavior is cooperative.       Vitals:    11/27/23 1124   BP: 124/84   Pulse: 62   Resp: 18   SpO2: 95%   Weight: 98 kg (216 lb)   Height: 180.3 cm (71\")     Body mass index is 30.13 kg/m².     Results Review:   I reviewed the patient's new clinical results.    Office Visit on 11/17/2023   Component Date Value Ref Range Status    Glucose 11/17/2023 98  65 - 99 mg/dL " Final    BUN 11/17/2023 15  8 - 23 mg/dL Final    Creatinine 11/17/2023 0.97  0.76 - 1.27 mg/dL Final    EGFR Result 11/17/2023 83.5  >60.0 mL/min/1.73 Final    BUN/Creatinine Ratio 11/17/2023 15.5  7.0 - 25.0 Final    Sodium 11/17/2023 141  136 - 145 mmol/L Final    Potassium 11/17/2023 4.2  3.5 - 5.2 mmol/L Final    Chloride 11/17/2023 101  98 - 107 mmol/L Final    Total CO2 11/17/2023 26.2  22.0 - 29.0 mmol/L Final    Calcium 11/17/2023 10.3  8.6 - 10.5 mg/dL Final    Total Protein 11/17/2023 7.4  6.0 - 8.5 g/dL Final    Albumin 11/17/2023 4.9  3.5 - 5.2 g/dL Final    Globulin 11/17/2023 2.5  gm/dL Final    A/G Ratio 11/17/2023 2.0  g/dL Final    Total Bilirubin 11/17/2023 0.8  0.0 - 1.2 mg/dL Final    Alkaline Phosphatase 11/17/2023 42  39 - 117 U/L Final    AST (SGOT) 11/17/2023 21  1 - 40 U/L Final    ALT (SGPT) 11/17/2023 29  1 - 41 U/L Final    Magnesium 11/17/2023 2.4  1.6 - 2.4 mg/dL Final    Total Cholesterol 11/17/2023 228 (H)  0 - 200 mg/dL Final    Triglycerides 11/17/2023 129  0 - 150 mg/dL Final    HDL Cholesterol 11/17/2023 39 (L)  40 - 60 mg/dL Final    VLDL Cholesterol Stoney 11/17/2023 24  5 - 40 mg/dL Final    LDL Chol Calc (NIH) 11/17/2023 165 (H)  0 - 100 mg/dL Final    Vitamin B-12 11/17/2023 650  211 - 946 pg/mL Final    Results may be falsely increased if patient taking Biotin.    Vitamin B6 11/17/2023 17.3  3.4 - 65.2 ug/L Final    TSH 11/17/2023 0.396  0.270 - 4.200 uIU/mL Final    Thyroid Peroxidase Antibody 11/17/2023 15  0 - 34 IU/mL Final    Thyroglobulin Ab 11/17/2023 <1.0  0.0 - 0.9 IU/mL Final    Thyroglobulin Antibody measured by Leyla Esa Methodology    Free T4 11/17/2023 1.18  0.93 - 1.70 ng/dL Final    Results may be falsely increased if patient taking Biotin.    CKMB 11/17/2023 2.86  <=10.40 ng/mL Final    Results may be falsely decreased if patient taking Biotin.    BEATA Direct 11/17/2023 Negative  Negative Final      Colonoscopy dated 8/15/2023 per Dr. Canas  -  Preparation of the colon was fair.  - One 8 to 10 mm polyp in the cecum, removed with a hot snare. Resected and retrieved.  - One 5 mm polyp in the cecum, removed with a hot snare. Resected and retrieved.  - One 4 mm polyp in the cecum, removed with a hot snare. Resected and retrieved.  - One 3 to 4 mm polyp in the cecum, removed with a hot snare. Resected and retrieved.  - Four 4 to 10mm polyps in the proximal ascending colon, in the mid ascending colon and in the distal ascending colon, removed with a hot snare. Resected and retrieved.  - One 8 mm polyp at the hepatic flexure, removed with a hot snare. Resected and retrieved.  - Five 4 to 6 mm polyps at the hepatic flexure, removed with a hot snare. Resected and retrieved.  - Two 5 to 6 mm polyps in the mid transverse colon and in the distal transverse colon, removed with a hot snare. Resected and retrieved.  - Diverticulosis in the sigmoid colon and in the descending colon.  - Small Non-bleeding external and internal hemorrhoids.  - Redundant colon- sigmoid / DC  A.     TRANSVERSE COLON POLYP, X2:  Tubular adenoma, multiple fragments  Negative for high-grade dysplasia/malignancy  B.     HEPATIC FLEXURE POLYP, X6:  Tubular adenoma, multiple fragments  Negative for high-grade dysplasia/malignancy  C.     ASCENDING COLON POLYP, X4:  Tubular adenoma, multiple fragments  Negative for high-grade dysplasia/malignancy  D.     CECUM POLYP, X4:  Tubular adenoma, multiple fragments  Negative for high-grade dysplasia/malignancy     Assessment / Plan      1. Adenomatous polyp of colon, unspecified part of colon  Colonoscopy 8/15/2023 with multiple polyps removed, pathology with multiple tubular adenomas without dysplasia. There is a family history of colon cancer in his grandfather diagnosed in his mid 70's   Colonoscopy for surveillance in 1 year, August 2024.     - Case Request  - sodium-potassium-magnesium sulfates (Suprep Bowel Prep Kit) 17.5-3.13-1.6 GM/177ML solution  oral solution; Use as directed for colonoscopy prep. Patient has instructions.  Dispense: 177 mL; Refill: 0    2. Gastroesophageal reflux disease without esophagitis  He has a history of reflux for many years that is reasonably controlled with Pepcid 40 mg at bedtime. No difficulty swallowing. He has not had an EGD in the past.   Anti-reflux measures.   Continue Pepcid 40 mg at bedtime for now.   EGD to rule out Leyva's - patient wishes to wait at this time. He will call back if he wants to schedule.     Patient Instructions   Antireflux measures: Avoid fried, fatty foods, alcohol, chocolate, coffee, tea,  soft drinks, all carbonated beverages (including sparkling water), peppermint and spearmint, spicy foods, tomatoes and tomato based foods, onion based foods, and garlic.   Other antireflux measures include weight reduction if overweight, avoiding tight clothing around the abdomen, elevating the head of the bed 6 inches with blocks under the head board, and don't drink or eat before going to bed and avoid lying down immediately after meals.  Pepcid 40 mg daily at bedtime.   Continue to avoid vaping/smoking.   High fiber, low fat diet with liberal water intake.   Colonoscopy for surveillance in 1 year, August 2024.  Patient declines EGD at this time. He will call back if he wants to schedule.   Colonoscopy: The indications, technique, alternatives and potential risk and complications were discussed with the patient including but not limited to bleeding, perforations, missing lesions and anesthetic complications. The patient understands and wishes to proceed with the procedure and has given their verbal consent. Written patient education information was given to the patient.   The patient will call if they have further questions before procedure.     Susy Hernandez, APRN  11/27/2023    Please note that portions of this note were completed with a voice recognition program.

## 2023-11-28 PROBLEM — D12.6 ADENOMATOUS POLYP OF COLON: Status: ACTIVE | Noted: 2023-11-27

## 2023-12-01 ENCOUNTER — PATIENT MESSAGE (OUTPATIENT)
Dept: INTERNAL MEDICINE | Facility: CLINIC | Age: 73
End: 2023-12-01
Payer: MEDICARE

## 2023-12-01 DIAGNOSIS — R29.898 LEG HEAVINESS: Primary | ICD-10-CM

## 2023-12-01 DIAGNOSIS — R09.89 OTHER SPECIFIED SYMPTOMS AND SIGNS INVOLVING THE CIRCULATORY AND RESPIRATORY SYSTEMS: ICD-10-CM

## 2023-12-01 DIAGNOSIS — E78.2 MIXED HYPERLIPIDEMIA: ICD-10-CM

## 2023-12-01 DIAGNOSIS — I10 PRIMARY HYPERTENSION: ICD-10-CM

## 2023-12-01 DIAGNOSIS — M79.604 PAIN IN BOTH LOWER EXTREMITIES: ICD-10-CM

## 2023-12-01 DIAGNOSIS — M79.605 PAIN IN BOTH LOWER EXTREMITIES: ICD-10-CM

## 2023-12-04 NOTE — TELEPHONE ENCOUNTER
From: Mohsen Bucio  To: Ronel Hyatt  Sent: 12/1/2023 9:24 AM EST  Subject: Leg Pain    You had mentioned a test to check for circulation problems in my legs. It seems that my labs ruled out other possibilities so that test would probably be good to do. Please let me know if I need to do something to pursue that.    Thanks,  Mehul

## 2023-12-20 ENCOUNTER — HOSPITAL ENCOUNTER (OUTPATIENT)
Dept: ULTRASOUND IMAGING | Facility: HOSPITAL | Age: 73
Discharge: HOME | End: 2023-12-20
Admitting: NURSE PRACTITIONER
Payer: MEDICARE

## 2023-12-20 DIAGNOSIS — E78.2 MIXED HYPERLIPIDEMIA: ICD-10-CM

## 2023-12-20 DIAGNOSIS — M79.605 PAIN IN BOTH LOWER EXTREMITIES: ICD-10-CM

## 2023-12-20 DIAGNOSIS — M79.604 PAIN IN BOTH LOWER EXTREMITIES: ICD-10-CM

## 2023-12-20 DIAGNOSIS — R09.89 OTHER SPECIFIED SYMPTOMS AND SIGNS INVOLVING THE CIRCULATORY AND RESPIRATORY SYSTEMS: ICD-10-CM

## 2023-12-20 DIAGNOSIS — I10 PRIMARY HYPERTENSION: ICD-10-CM

## 2023-12-20 DIAGNOSIS — R29.898 LEG HEAVINESS: ICD-10-CM

## 2023-12-20 PROCEDURE — 93923 UPR/LXTR ART STDY 3+ LVLS: CPT

## 2023-12-22 ENCOUNTER — TELEPHONE (OUTPATIENT)
Dept: INTERNAL MEDICINE | Facility: CLINIC | Age: 73
End: 2023-12-22
Payer: MEDICARE

## 2023-12-22 NOTE — TELEPHONE ENCOUNTER
"Relay      \"Mild hardening of the arteries in both lower legs.  Keep blood pressure well managed.  Be physically active most days of the week.   \"          Name: Mohsen Bucio    Relationship: Self    Best Callback Number: [phone]     HUB PROVIDED THE RELAY MESSAGE FROM THE OFFICE   PATIENT VOICED UNDERSTANDING AND HAS NO FURTHER QUESTIONS AT THIS TIME    ADDITIONAL INFORMATION:\    "

## 2023-12-22 NOTE — TELEPHONE ENCOUNTER
"Relay     \"Mild hardening of the arteries in both lower legs.  Keep blood pressure well managed.  Be physically active most days of the week.   \"        "

## 2023-12-22 NOTE — TELEPHONE ENCOUNTER
----- Message from LUCITA Lincoln sent at 12/22/2023 12:03 PM EST -----  Mild hardening of the arteries in both lower legs.  Keep blood pressure well managed.  Be physically active most days of the week.

## 2024-01-03 ENCOUNTER — PREP FOR SURGERY (OUTPATIENT)
Dept: OTHER | Facility: HOSPITAL | Age: 74
End: 2024-01-03
Payer: MEDICARE

## 2024-01-03 ENCOUNTER — TELEPHONE (OUTPATIENT)
Dept: GASTROENTEROLOGY | Facility: CLINIC | Age: 74
End: 2024-01-03
Payer: MEDICARE

## 2024-01-03 DIAGNOSIS — K21.9 GASTROESOPHAGEAL REFLUX DISEASE WITHOUT ESOPHAGITIS: Primary | ICD-10-CM

## 2024-01-03 DIAGNOSIS — Z86.010 PERSONAL HISTORY OF COLONIC POLYPS: ICD-10-CM

## 2024-01-03 RX ORDER — SODIUM CHLORIDE 9 MG/ML
70 INJECTION, SOLUTION INTRAVENOUS CONTINUOUS PRN
OUTPATIENT
Start: 2024-01-03

## 2024-01-03 NOTE — TELEPHONE ENCOUNTER
----- Message from LUCITA Tarango sent at 1/3/2024 10:42 AM EST -----  This patient is scheduled for a colonoscopy 8/28/2024 and has decided to add on the EGD. I put in new case request for a double, but it will need to be updated in the book and let Flor or whoever know it is now going to be a double. Thanks!!

## 2024-03-27 ENCOUNTER — OFFICE VISIT (OUTPATIENT)
Dept: INTERNAL MEDICINE | Facility: CLINIC | Age: 74
End: 2024-03-27
Payer: MEDICARE

## 2024-03-27 VITALS
HEART RATE: 91 BPM | HEIGHT: 71 IN | OXYGEN SATURATION: 95 % | SYSTOLIC BLOOD PRESSURE: 128 MMHG | DIASTOLIC BLOOD PRESSURE: 82 MMHG | WEIGHT: 213 LBS | BODY MASS INDEX: 29.82 KG/M2 | TEMPERATURE: 98 F

## 2024-03-27 DIAGNOSIS — Z00.00 ANNUAL PHYSICAL EXAM: ICD-10-CM

## 2024-03-27 DIAGNOSIS — I10 PRIMARY HYPERTENSION: ICD-10-CM

## 2024-03-27 DIAGNOSIS — Z00.00 ENCOUNTER FOR SUBSEQUENT ANNUAL WELLNESS VISIT (AWV) IN MEDICARE PATIENT: Primary | ICD-10-CM

## 2024-03-27 DIAGNOSIS — K21.9 GASTROESOPHAGEAL REFLUX DISEASE WITHOUT ESOPHAGITIS: ICD-10-CM

## 2024-03-27 DIAGNOSIS — Z12.5 PROSTATE CANCER SCREENING: ICD-10-CM

## 2024-03-27 DIAGNOSIS — E78.2 MIXED HYPERLIPIDEMIA: ICD-10-CM

## 2024-03-27 DIAGNOSIS — I73.9 PAD (PERIPHERAL ARTERY DISEASE): ICD-10-CM

## 2024-03-27 DIAGNOSIS — Z78.9 STATIN INTOLERANCE: ICD-10-CM

## 2024-03-27 NOTE — PROGRESS NOTES
The ABCs of the Annual Wellness Visit  Subsequent Medicare Wellness Visit    Subjective    Mohsen Bucio is a 74 y.o. male who presents for a Subsequent Medicare Wellness Visit.    The following portions of the patient's history were reviewed and   updated as appropriate: allergies, current medications, past family history, past medical history, past social history, past surgical history, and problem list.    Compared to one year ago, the patient feels his physical   health is the same.    Compared to one year ago, the patient feels his mental   health is the same.    Recent Hospitalizations:  He was not admitted to the hospital during the last year.       Current Medical Providers:  Patient Care Team:  Ronel Hyatt APRN as PCP - General (Family Medicine)  Susy Hernandez APRN as Nurse Practitioner (Gastroenterology)    Outpatient Medications Prior to Visit   Medication Sig Dispense Refill    albuterol 108 (90 Base) MCG/ACT inhaler Inhale 2 puffs Every 4 (Four) Hours As Needed for Wheezing.      Bempedoic Acid 180 MG tablet Take 1 tablet by mouth Daily. 90 tablet 3    Breo Ellipta 100-25 MCG/INH inhaler Inhale 1 puff Daily.      Cyanocobalamin 2500 MCG sublingual tablet Daily 90 each 3    desloratadine (CLARINEX) 5 MG tablet Take 1 tablet by mouth Daily As Needed.      famotidine (PEPCID) 40 MG tablet Take 1 tablet by mouth Daily. 90 tablet 1    fluticasone (FLONASE) 50 MCG/ACT nasal spray 2 sprays into the nostril(s) as directed by provider Daily. 16 g 3    lisinopril (PRINIVIL,ZESTRIL) 5 MG tablet Take 1 tablet by mouth Daily. 90 tablet 3    sodium-potassium-magnesium sulfates (Suprep Bowel Prep Kit) 17.5-3.13-1.6 GM/177ML solution oral solution Use as directed for colonoscopy prep. Patient has instructions. 177 mL 0     No facility-administered medications prior to visit.       No opioid medication identified on active medication list. I have reviewed chart for other potential  high risk  "medication/s and harmful drug interactions in the elderly.        Aspirin is not on active medication list.  Aspirin use is not indicated based on review of current medical condition/s. Risk of harm outweighs potential benefits.  .    Patient Active Problem List   Diagnosis    Atopic rhinitis    Asthma    Chronic cough    Hypercholesterolemia    Hyperthyroidism    Hypothyroidism    Uninodular goiter    Multiple thyroid nodules    Cobalamin deficiency    Bilateral impacted cerumen    Stage 2 chronic kidney disease    Statin intolerance    Mixed hyperlipidemia    Primary hypertension    Gastroesophageal reflux disease without esophagitis    Personal history of colonic polyps    Adenomatous polyp of colon    PAD (peripheral artery disease)     Advance Care Planning   Advance Care Planning     Advance Directive is not on file.  ACP discussion was held with the patient during this visit. Patient does not have an advance directive, information provided.     Objective    Vitals:    03/27/24 1054   BP: 128/82   Pulse: 91   Temp: 98 °F (36.7 °C)   SpO2: 95%   Weight: 96.6 kg (213 lb)   Height: 180.3 cm (70.98\")   PainSc: 0-No pain     Estimated body mass index is 29.72 kg/m² as calculated from the following:    Height as of this encounter: 180.3 cm (70.98\").    Weight as of this encounter: 96.6 kg (213 lb).           Does the patient have evidence of cognitive impairment? No    Lab Results   Component Value Date    CHLPL 228 (H) 03/29/2024    TRIG 163 (H) 03/29/2024    HDL 38 (L) 03/29/2024     (H) 03/29/2024    VLDL 30 03/29/2024        HEALTH RISK ASSESSMENT    Smoking Status:  Social History     Tobacco Use   Smoking Status Never    Passive exposure: Never   Smokeless Tobacco Never     Alcohol Consumption:  Social History     Substance and Sexual Activity   Alcohol Use Yes    Alcohol/week: 5.0 standard drinks of alcohol    Types: 5 Drinks containing 0.5 oz of alcohol per week    Comment: occ     Fall Risk " Screen:    ESTIVEN Fall Risk Assessment was completed, and patient is at LOW risk for falls.Assessment completed on:3/27/2024    Depression Screening:      3/27/2024    10:59 AM   PHQ-2/PHQ-9 Depression Screening   Little Interest or Pleasure in Doing Things 0-->not at all   Feeling Down, Depressed or Hopeless 0-->not at all   PHQ-9: Brief Depression Severity Measure Score 0       Health Habits and Functional and Cognitive Screening:      3/27/2024    10:59 AM   Functional & Cognitive Status   Do you have difficulty preparing food and eating? No   Do you have difficulty bathing yourself, getting dressed or grooming yourself? No   Do you have difficulty using the toilet? No   Do you have difficulty moving around from place to place? No   Do you have trouble with steps or getting out of a bed or a chair? No   Current Diet Well Balanced Diet   Dental Exam Up to date   Eye Exam Up to date   Exercise (times per week) 7 times per week   Current Exercises Include Walking;Weightlifting   Do you need help using the phone?  No   Are you deaf or do you have serious difficulty hearing?  No   Do you need help to go to places out of walking distance? No   Do you need help shopping? No   Do you need help preparing meals?  No   Do you need help with housework?  No   Do you need help with laundry? No   Do you need help taking your medications? No   Do you need help managing money? No   Do you ever drive or ride in a car without wearing a seat belt? No   Have you felt unusual stress, anger or loneliness in the last month? No   Who do you live with? Spouse   If you need help, do you have trouble finding someone available to you? No   Have you been bothered in the last four weeks by sexual problems? No   Do you have difficulty concentrating, remembering or making decisions? No       Age-appropriate Screening Schedule:  Refer to the list below for future screening recommendations based on patient's age, sex and/or medical conditions.  Orders for these recommended tests are listed in the plan section. The patient has been provided with a written plan.    Health Maintenance   Topic Date Due    COLORECTAL CANCER SCREENING  08/15/2024    INFLUENZA VACCINE  08/01/2024    BMI FOLLOWUP  11/17/2024    ANNUAL WELLNESS VISIT  03/27/2025    LIPID PANEL  03/29/2025    TDAP/TD VACCINES (5 - Td or Tdap) 03/21/2032    COVID-19 Vaccine  Completed    RSV Vaccine - Adults  Completed    Pneumococcal Vaccine 65+  Completed    ZOSTER VACCINE  Completed    HEPATITIS C SCREENING  Addressed                  CMS Preventative Services Quick Reference  Risk Factors Identified During Encounter  Immunizations Discussed/Encouraged: Prevnar 20 (Pneumococcal 20-valent conjugate) and RSV (Respiratory Syncytial Virus)  The above risks/problems have been discussed with the patient.  Pertinent information has been shared with the patient in the After Visit Summary.  An After Visit Summary and PPPS were made available to the patient.    Follow Up:   Next Medicare Wellness visit to be scheduled in 1 year.       Additional E&M Note during same encounter follows:  Patient has multiple medical problems which are significant and separately identifiable that require additional work above and beyond the Medicare Wellness Visit.      Chief Complaint  Medicare Wellness-subsequent    Subjective        HPI  Mohsen Bucio is also being seen today for his annual physical.      Colonoscopy scheduled later this year, 17 polyps removed last year during colonoscopy.  GERD.  Taking pepcid.  Working well.      HTN, hyperlipids, PAD. Walking daily and following [employer] workout recommendations.  Has to stretch at times in hips, calves.  Wakes up with leg pain HS.  Has felt like something was pricking his legs while he was sitting down, at times; lasts about a minute. Feet have felt prickly, mostly on the bottoms.Taking medications as prescribed for HTN, hyperlipids. Taking CholestOff and fish oil  "and CO-Q 10. Intolerant of statins.  Eating healthy diet.  Denies chest pain, dyspnea, orthopnea, palpitations, lower extremity edema, confusion, headaches, weakness, visual disturbances.         Dental and eye exams UTD.  Wears glasses.  Cataract surgery scheduled.      Review of Systems   All other systems reviewed and are negative.      Objective   Vital Signs:  /82   Pulse 91   Temp 98 °F (36.7 °C)   Ht 180.3 cm (70.98\")   Wt 96.6 kg (213 lb)   SpO2 95%   BMI 29.72 kg/m²     Physical Exam  Constitutional:       Appearance: He is well-developed. He is not ill-appearing.   HENT:      Head: Normocephalic.      Right Ear: Tympanic membrane, ear canal and external ear normal.      Left Ear: Tympanic membrane, ear canal and external ear normal.      Nose: Nose normal.      Mouth/Throat:      Mouth: Mucous membranes are moist.      Pharynx: Oropharynx is clear. Uvula midline.   Eyes:      Extraocular Movements: Extraocular movements intact.      Conjunctiva/sclera: Conjunctivae normal.      Pupils: Pupils are equal, round, and reactive to light.   Neck:      Thyroid: No thyromegaly.      Vascular: No carotid bruit.   Cardiovascular:      Rate and Rhythm: Normal rate and regular rhythm.      Pulses:           Radial pulses are 2+ on the right side and 2+ on the left side.        Dorsalis pedis pulses are 2+ on the right side and 2+ on the left side.      Heart sounds: Normal heart sounds.   Pulmonary:      Effort: Pulmonary effort is normal.      Breath sounds: Normal breath sounds.   Abdominal:      General: Bowel sounds are normal.      Palpations: Abdomen is soft.      Tenderness: There is no abdominal tenderness.   Musculoskeletal:         General: No tenderness or deformity. Normal range of motion.      Cervical back: Full passive range of motion without pain, normal range of motion and neck supple.      Right lower leg: No edema.      Left lower leg: No edema.   Lymphadenopathy:      Cervical: No " cervical adenopathy.   Skin:     General: Skin is warm.      Capillary Refill: Capillary refill takes less than 2 seconds.   Neurological:      Mental Status: He is alert and oriented to person, place, and time.      Sensory: No sensory deficit.      Coordination: Coordination normal.      Gait: Gait normal.      Comments: CN II-XII normal   Psychiatric:         Attention and Perception: Attention normal.         Mood and Affect: Mood and affect normal.         Speech: Speech normal.         Behavior: Behavior normal.         Thought Content: Thought content normal.                         Assessment and Plan   Diagnoses and all orders for this visit:    1. Encounter for subsequent annual wellness visit (AWV) in Medicare patient (Primary)    2. Annual physical exam    3. PAD (peripheral artery disease)        - Continue daily walking.      4. Primary hypertension  -     Comprehensive Metabolic Panel        - Follow heart healthy diet.  Keep sodium intake < 1500 mg per day.  Avoid processed & fast foods.          - Exercise as tolerated, with a goal of 30 minutes of moderate exercise most days.         - Take medications as prescribed.    5. Statin intolerance    6. Mixed hyperlipidemia  -     Lipid Panel    7. Gastroesophageal reflux disease without esophagitis        - Avoid triggers such as spicy or greasy food, alcohol, chocolate, caffeine, carbonated beverages, tomatoes and tomato sauces, onions, smoking        - May raise HOB 30 degrees with risers or blocks, if desired        - Do not eat within 2-3 hours of lying down        - Avoid clothing, belts that constrict midsection        - Losing weight may reduce symptoms    8. Prostate cancer screening  -     PSA SCREENING             Follow Up   Return in about 6 months (around 9/27/2024) for Next scheduled follow up.  Patient was given instructions and counseling regarding his condition or for health maintenance advice. Please see specific information pulled  into the AVS if appropriate.

## 2024-03-29 LAB
ALBUMIN SERPL-MCNC: 4.6 G/DL (ref 3.5–5.2)
ALBUMIN/GLOB SERPL: 1.6 G/DL
ALP SERPL-CCNC: 42 U/L (ref 39–117)
ALT SERPL-CCNC: 30 U/L (ref 1–41)
AST SERPL-CCNC: 25 U/L (ref 1–40)
BILIRUB SERPL-MCNC: 0.5 MG/DL (ref 0–1.2)
BUN SERPL-MCNC: 14 MG/DL (ref 8–23)
BUN/CREAT SERPL: 12.2 (ref 7–25)
CALCIUM SERPL-MCNC: 9.9 MG/DL (ref 8.6–10.5)
CHLORIDE SERPL-SCNC: 103 MMOL/L (ref 98–107)
CHOLEST SERPL-MCNC: 228 MG/DL (ref 0–200)
CO2 SERPL-SCNC: 26.6 MMOL/L (ref 22–29)
CREAT SERPL-MCNC: 1.15 MG/DL (ref 0.76–1.27)
EGFRCR SERPLBLD CKD-EPI 2021: 67.6 ML/MIN/1.73
GLOBULIN SER CALC-MCNC: 2.8 GM/DL
GLUCOSE SERPL-MCNC: 103 MG/DL (ref 65–99)
HDLC SERPL-MCNC: 38 MG/DL (ref 40–60)
LDLC SERPL CALC-MCNC: 160 MG/DL (ref 0–100)
POTASSIUM SERPL-SCNC: 5.2 MMOL/L (ref 3.5–5.2)
PROT SERPL-MCNC: 7.4 G/DL (ref 6–8.5)
PSA SERPL-MCNC: 1.74 NG/ML (ref 0–4)
SODIUM SERPL-SCNC: 139 MMOL/L (ref 136–145)
TRIGL SERPL-MCNC: 163 MG/DL (ref 0–150)
VLDLC SERPL CALC-MCNC: 30 MG/DL (ref 5–40)

## 2024-04-01 ENCOUNTER — PATIENT MESSAGE (OUTPATIENT)
Dept: INTERNAL MEDICINE | Facility: CLINIC | Age: 74
End: 2024-04-01
Payer: MEDICARE

## 2024-04-01 NOTE — PROGRESS NOTES
Glucose 103  Cholesterol remains elevated; I see there are multiple allergies to cholesterol medications. There are subcutaneous injection options if interested such as repatha if want to look into this and let elvira know to help lower cholesterol which will in turn lower risk of future heart attack or stroke  PSA normal

## 2024-04-11 PROBLEM — I73.9 PAD (PERIPHERAL ARTERY DISEASE): Status: ACTIVE | Noted: 2024-04-11

## 2024-04-24 ENCOUNTER — PREP FOR SURGERY (OUTPATIENT)
Dept: OTHER | Facility: HOSPITAL | Age: 74
End: 2024-04-24
Payer: MEDICARE

## 2024-04-24 DIAGNOSIS — H25.12 NUCLEAR SCLEROTIC CATARACT OF LEFT EYE: Primary | ICD-10-CM

## 2024-04-24 RX ORDER — TETRACAINE HYDROCHLORIDE 5 MG/ML
1 SOLUTION OPHTHALMIC SEE ADMIN INSTRUCTIONS
OUTPATIENT
Start: 2024-04-24

## 2024-04-24 RX ORDER — SODIUM CHLORIDE 9 MG/ML
40 INJECTION, SOLUTION INTRAVENOUS AS NEEDED
OUTPATIENT
Start: 2024-04-24

## 2024-04-24 RX ORDER — PREDNISOLONE ACETATE 10 MG/ML
1 SUSPENSION/ DROPS OPHTHALMIC SEE ADMIN INSTRUCTIONS
OUTPATIENT
Start: 2024-04-24

## 2024-04-24 RX ORDER — SODIUM CHLORIDE 0.9 % (FLUSH) 0.9 %
10 SYRINGE (ML) INJECTION EVERY 12 HOURS SCHEDULED
OUTPATIENT
Start: 2024-04-24

## 2024-04-24 RX ORDER — SODIUM CHLORIDE 0.9 % (FLUSH) 0.9 %
1-10 SYRINGE (ML) INJECTION AS NEEDED
OUTPATIENT
Start: 2024-04-24

## 2024-04-24 RX ORDER — CYCLOPENT/TROPIC/PHEN/KETR/WAT 1%-1%-2.5%
1 DROPS (EA) OPHTHALMIC (EYE)
OUTPATIENT
Start: 2024-04-24 | End: 2024-04-24

## 2024-04-29 ENCOUNTER — PATIENT MESSAGE (OUTPATIENT)
Dept: INTERNAL MEDICINE | Facility: CLINIC | Age: 74
End: 2024-04-29
Payer: MEDICARE

## 2024-04-29 DIAGNOSIS — R73.9 HYPERGLYCEMIA: ICD-10-CM

## 2024-04-29 DIAGNOSIS — Z78.9 STATIN INTOLERANCE: ICD-10-CM

## 2024-04-29 DIAGNOSIS — I10 PRIMARY HYPERTENSION: Primary | ICD-10-CM

## 2024-04-29 DIAGNOSIS — E78.2 MIXED HYPERLIPIDEMIA: ICD-10-CM

## 2024-05-01 PROBLEM — H25.12 NUCLEAR SCLEROTIC CATARACT OF LEFT EYE: Status: ACTIVE | Noted: 2024-04-24

## 2024-05-09 ENCOUNTER — ANESTHESIA EVENT (OUTPATIENT)
Dept: PERIOP | Facility: HOSPITAL | Age: 74
End: 2024-05-09
Payer: MEDICARE

## 2024-05-09 ENCOUNTER — HOSPITAL ENCOUNTER (OUTPATIENT)
Facility: HOSPITAL | Age: 74
Setting detail: HOSPITAL OUTPATIENT SURGERY
Discharge: HOME | End: 2024-05-09
Attending: OPHTHALMOLOGY | Admitting: OPHTHALMOLOGY
Payer: MEDICARE

## 2024-05-09 ENCOUNTER — ANESTHESIA (OUTPATIENT)
Dept: PERIOP | Facility: HOSPITAL | Age: 74
End: 2024-05-09
Payer: MEDICARE

## 2024-05-09 VITALS
BODY MASS INDEX: 30.1 KG/M2 | HEART RATE: 54 BPM | RESPIRATION RATE: 18 BRPM | OXYGEN SATURATION: 95 % | HEIGHT: 71 IN | TEMPERATURE: 97.6 F | DIASTOLIC BLOOD PRESSURE: 75 MMHG | WEIGHT: 215 LBS | SYSTOLIC BLOOD PRESSURE: 118 MMHG

## 2024-05-09 DIAGNOSIS — H25.12 NUCLEAR SCLEROTIC CATARACT OF LEFT EYE: ICD-10-CM

## 2024-05-09 PROCEDURE — V2632 POST CHMBR INTRAOCULAR LENS: HCPCS | Performed by: OPHTHALMOLOGY

## 2024-05-09 PROCEDURE — 25010000002 MIDAZOLAM PER 1MG: Performed by: NURSE ANESTHETIST, CERTIFIED REGISTERED

## 2024-05-09 PROCEDURE — 25010000002 FENTANYL CITRATE (PF) 50 MCG/ML SOLUTION PREFILLED SYRINGE: Performed by: NURSE ANESTHETIST, CERTIFIED REGISTERED

## 2024-05-09 PROCEDURE — 25810000003 LACTATED RINGERS PER 1000 ML: Performed by: OPHTHALMOLOGY

## 2024-05-09 DEVICE — LENS MONOFOCAL IQ CC60WF210: Type: IMPLANTABLE DEVICE | Site: POSTERIOR CHAMBER | Status: FUNCTIONAL

## 2024-05-09 RX ORDER — PREDNISOLONE ACETATE 10 MG/ML
1 SUSPENSION/ DROPS OPHTHALMIC 4 TIMES DAILY
Start: 2024-05-09

## 2024-05-09 RX ORDER — BALANCED SALT SOLUTION 6.4; .75; .48; .3; 3.9; 1.7 MG/ML; MG/ML; MG/ML; MG/ML; MG/ML; MG/ML
SOLUTION OPHTHALMIC AS NEEDED
Status: DISCONTINUED | OUTPATIENT
Start: 2024-05-09 | End: 2024-05-09 | Stop reason: HOSPADM

## 2024-05-09 RX ORDER — SODIUM CHLORIDE 0.9 % (FLUSH) 0.9 %
1-10 SYRINGE (ML) INJECTION AS NEEDED
Status: DISCONTINUED | OUTPATIENT
Start: 2024-05-09 | End: 2024-05-09 | Stop reason: HOSPADM

## 2024-05-09 RX ORDER — KETAMINE HCL IN NACL, ISO-OSM 100MG/10ML
SYRINGE (ML) INJECTION AS NEEDED
Status: DISCONTINUED | OUTPATIENT
Start: 2024-05-09 | End: 2024-05-09 | Stop reason: SURG

## 2024-05-09 RX ORDER — ACETAZOLAMIDE 500 MG/1
500 CAPSULE, EXTENDED RELEASE ORAL ONCE
Status: COMPLETED | OUTPATIENT
Start: 2024-05-09 | End: 2024-05-09

## 2024-05-09 RX ORDER — PREDNISOLONE ACETATE 10 MG/ML
1 SUSPENSION/ DROPS OPHTHALMIC SEE ADMIN INSTRUCTIONS
Status: DISCONTINUED | OUTPATIENT
Start: 2024-05-09 | End: 2024-05-09 | Stop reason: HOSPADM

## 2024-05-09 RX ORDER — TETRACAINE HYDROCHLORIDE 5 MG/ML
SOLUTION OPHTHALMIC AS NEEDED
Status: DISCONTINUED | OUTPATIENT
Start: 2024-05-09 | End: 2024-05-09 | Stop reason: HOSPADM

## 2024-05-09 RX ORDER — FENTANYL CITRATE 50 UG/ML
INJECTION, SOLUTION INTRAMUSCULAR; INTRAVENOUS AS NEEDED
Status: DISCONTINUED | OUTPATIENT
Start: 2024-05-09 | End: 2024-05-09 | Stop reason: SURG

## 2024-05-09 RX ORDER — PREDNISOLONE ACETATE 10 MG/ML
SUSPENSION/ DROPS OPHTHALMIC AS NEEDED
Status: DISCONTINUED | OUTPATIENT
Start: 2024-05-09 | End: 2024-05-09 | Stop reason: HOSPADM

## 2024-05-09 RX ORDER — SODIUM CHLORIDE 0.9 % (FLUSH) 0.9 %
10 SYRINGE (ML) INJECTION EVERY 12 HOURS SCHEDULED
Status: DISCONTINUED | OUTPATIENT
Start: 2024-05-09 | End: 2024-05-09 | Stop reason: HOSPADM

## 2024-05-09 RX ORDER — SODIUM CHLORIDE 9 MG/ML
40 INJECTION, SOLUTION INTRAVENOUS AS NEEDED
Status: DISCONTINUED | OUTPATIENT
Start: 2024-05-09 | End: 2024-05-09 | Stop reason: HOSPADM

## 2024-05-09 RX ORDER — LIDOCAINE HYDROCHLORIDE 40 MG/ML
INJECTION, SOLUTION RETROBULBAR AS NEEDED
Status: DISCONTINUED | OUTPATIENT
Start: 2024-05-09 | End: 2024-05-09 | Stop reason: HOSPADM

## 2024-05-09 RX ORDER — CYCLOPENT/TROPIC/PHEN/KETR/WAT 1%-1%-2.5%
1 DROPS (EA) OPHTHALMIC (EYE)
Status: COMPLETED | OUTPATIENT
Start: 2024-05-09 | End: 2024-05-09

## 2024-05-09 RX ORDER — SODIUM CHLORIDE, SODIUM LACTATE, POTASSIUM CHLORIDE, CALCIUM CHLORIDE 600; 310; 30; 20 MG/100ML; MG/100ML; MG/100ML; MG/100ML
1000 INJECTION, SOLUTION INTRAVENOUS CONTINUOUS
Status: DISCONTINUED | OUTPATIENT
Start: 2024-05-09 | End: 2024-05-09 | Stop reason: HOSPADM

## 2024-05-09 RX ORDER — TETRACAINE HYDROCHLORIDE 5 MG/ML
1 SOLUTION OPHTHALMIC SEE ADMIN INSTRUCTIONS
Status: COMPLETED | OUTPATIENT
Start: 2024-05-09 | End: 2024-05-09

## 2024-05-09 RX ORDER — NEOMYCIN SULFATE, POLYMYXIN B SULFATE, AND DEXAMETHASONE 3.5; 10000; 1 MG/G; [USP'U]/G; MG/G
OINTMENT OPHTHALMIC AS NEEDED
Status: DISCONTINUED | OUTPATIENT
Start: 2024-05-09 | End: 2024-05-09 | Stop reason: HOSPADM

## 2024-05-09 RX ORDER — MIDAZOLAM HYDROCHLORIDE 2 MG/2ML
INJECTION, SOLUTION INTRAMUSCULAR; INTRAVENOUS AS NEEDED
Status: DISCONTINUED | OUTPATIENT
Start: 2024-05-09 | End: 2024-05-09 | Stop reason: SURG

## 2024-05-09 RX ADMIN — ACETAZOLAMIDE 500 MG: 500 CAPSULE ORAL at 14:50

## 2024-05-09 RX ADMIN — TETRACAINE HYDROCHLORIDE 1 DROP: 5 SOLUTION OPHTHALMIC at 13:00

## 2024-05-09 RX ADMIN — Medication 1 DROP: at 13:12

## 2024-05-09 RX ADMIN — FENTANYL CITRATE 25 MCG: 50 INJECTION, SOLUTION INTRAMUSCULAR; INTRAVENOUS at 14:24

## 2024-05-09 RX ADMIN — Medication 20 MG: at 14:24

## 2024-05-09 RX ADMIN — TETRACAINE HYDROCHLORIDE 1 DROP: 5 SOLUTION OPHTHALMIC at 13:01

## 2024-05-09 RX ADMIN — SODIUM CHLORIDE, POTASSIUM CHLORIDE, SODIUM LACTATE AND CALCIUM CHLORIDE 1000 ML: 600; 310; 30; 20 INJECTION, SOLUTION INTRAVENOUS at 13:05

## 2024-05-09 RX ADMIN — MIDAZOLAM HYDROCHLORIDE 2 MG: 1 INJECTION, SOLUTION INTRAMUSCULAR; INTRAVENOUS at 14:24

## 2024-05-09 RX ADMIN — Medication 1 DROP: at 13:07

## 2024-05-09 RX ADMIN — Medication 1 DROP: at 13:02

## 2024-05-09 NOTE — H&P
[facility]         History and Physical    Patient Name: Mohsen Bucio  MRN: [id number]  : 1950  Gender: male     HPI: Patient complaint of PPLOV Left eye diagnosed with cataract. Patient requests PHACO PCIOL for Increase of VA/ADL.    History:    Past Medical History:   Diagnosis Date    Allergic 1965    Pollen, weeds, mold, dust    Asthma     Bronchitis     Bursitis     Chest pain     pt denies 23    Colon polyp     colonoscopy     Degenerative disc disease, cervical     GERD (gastroesophageal reflux disease)     Graves disease     Hyperlipidemia     Hypertension     Hyperthyroidism     Myalgia     Myositis     Neuroma of foot     PAD (peripheral artery disease) 2024    Sleep apnea     Use CPAP machine    Stage 2 chronic kidney disease 2023    Tennis elbow     Urinary tract infection        Past Surgical History:   Procedure Laterality Date    ADENOIDECTOMY  1969    APPENDECTOMY      COLONOSCOPY  2016    COLONOSCOPY N/A 8/15/2023    Procedure: COLONOSCOPY WITH POLYPECTOMY;  Surgeon: Kennedy Canas MD;  Location: [facility];  Service: Gastroenterology;  Laterality: N/A;    INGUINAL HERNIA REPAIR Left     x2 no mesh    TONSILLECTOMY         Social History     Socioeconomic History    Marital status:    Tobacco Use    Smoking status: Never     Passive exposure: Never    Smokeless tobacco: Never   Vaping Use    Vaping status: Never Used   Substance and Sexual Activity    Alcohol use: Not Currently     Comment: occ    Drug use: Never    Sexual activity: Yes     Partners: Female     Birth control/protection: Post-menopausal       Family History   Problem Relation Age of Onset    Benign prostatic hyperplasia Father     Stroke Father     Cancer Father         lung    Hyperlipidemia Father     Colon cancer Paternal Grandfather         diagnosed mid 70's    Stroke Other     Cancer Other        Prior to  Admission Medications:  Medications Prior to Admission   Medication Sig Dispense Refill Last Dose    albuterol 108 (90 Base) MCG/ACT inhaler Inhale 2 puffs Every 4 (Four) Hours As Needed for Wheezing.   Past Month    Breo Ellipta 100-25 MCG/INH inhaler Inhale 1 puff Daily.   5/9/2024 at 0800    Cyanocobalamin 2500 MCG sublingual tablet Daily 90 each 3 5/8/2024 at 0800    desloratadine (CLARINEX) 5 MG tablet Take 1 tablet by mouth Daily As Needed.   5/8/2024 at 0800    famotidine (PEPCID) 40 MG tablet Take 1 tablet by mouth Daily. 90 tablet 1 5/8/2024 at 0800    fluticasone (FLONASE) 50 MCG/ACT nasal spray 2 sprays into the nostril(s) as directed by provider Daily. 16 g 3 5/8/2024 at 0800    lisinopril (PRINIVIL,ZESTRIL) 5 MG tablet Take 1 tablet by mouth Daily. 90 tablet 3 5/8/2024 at 0800    Bempedoic Acid 180 MG tablet Take 1 tablet by mouth Daily. 90 tablet 3        Allergies:  Allergies   Allergen Reactions    Atorvastatin Other (See Comments)     Muscle cramps    Pitavastatin Other (See Comments)     Muscle cramps    Pravastatin Other (See Comments)     Muscle cramps    Rosuvastatin Other (See Comments)     Muscle cramps    Simvastatin Other (See Comments)     Muscle cramps    Zetia [Ezetimibe] Myalgia    Penicillins Other (See Comments)     Allergy testing years ago showed patient allergic        Vitals: Temp:  [97 °F (36.1 °C)] 97 °F (36.1 °C)  Heart Rate:  [78] 78  Resp:  [18] 18  BP: (160)/(77) 160/77    Review of Systems:   Within Normal Limits Abnormal   HEENT [x]    []     Cardiovascular [x]   []     Gastrointestinal [x]   []     Genitourinary [x]   []     Neurologic [x]   []     Pulmonary [x]   []       Physical Exam:   Within Normal Limits Abnormal   HEENT [x]    []     Heart [x]   []     Lungs [x]   []     Abdomen [x]   []     Extremities [x]   []       Impression: Left nuclear sclerotic cataract.     Plan: CATARACT PHACO EXTRACTION WITH INTRAOCULAR LENS IMPLANT LEFT (Left)     Ean Carlisle,  MD  5/9/2024

## 2024-05-09 NOTE — DISCHARGE INSTRUCTIONS
[facility], [facility] [place]  [address] [place] [address]  (P): [phone]           (F): [phone]    Mohsen Bucio  PATIENT NAME:__________________________________    Left Eye    POST OPERATIVE INSTRUCTIONS    You have received anesthesia today. DO NOT drive, drink alcohol, sign legal documents.   After surgery, your eye will not hurt. It may feel scratchy, sticky or uncomfortable. Your eye will be sensitive to light.  Most people see better 1-3 days after the procedure, but it could take 3 weeks to get the full benefits and reach your visual potential. If your vision is blurry for a few days it is normal, and means you may have swelling outside or inside the eye. For some patients, a bubble is placed and there will be blurriness.   You should receive a post-op kit with tape and an eye shield. Wear the shield for the first 3 nights after surgery to keep you from rubbing the eye.  Most people are able to return to their normal routine 1-3 days after surgery, however, due to the brain adjusting to your new vision you may have trouble judging distances and want to be careful when driving and going up and downstairs.   You can shower and wash your hair the day after surgery. Keep water, shampoo, hair spray and shaving lotion out of the eye, especially for the first week.  During the first week, you should AVOID:   Rubbing or putting pressure on your eye.  Eye make-up, face cream or lotion, hair coloring or perms  Strenuous activities, such as running or lifting weights, as to avoid sweat from getting in the eye. Avoid swimming, hot tubs, fumes or raymond conditions.   Keep your head above your heart (no hanging the head down for periods of time).  Some discomfort and blurred vision after surgery are normal, but if you have any unusual pain, swelling, bleeding or sudden decrease in vision, contact our office immediately. Emergency assistance is available at any time by calling:    Dr. Ean Jefferson Dr.  Pratibha Carlisle    [phone]    If unable to reach call [facility] @ [phone]      POST OPERATIVE DROP INSTRUCTIONS  You have been prescribed eye drops to use after surgery, please follow these instructions:  PLACE A MACHO IN THE DAY COLUMN EACH TIME YOU USE TO KEEP ON SCHEDULE. WAIT 5 MINUTES IN BETWEEN DROPS    Prednisolone (PINK TOP) SHAKE WELL BEFORE USE   GIVEN TO YOU BY THE HOSPITAL    WEEK 1-USE 4  (FOUR) TIMES A DAY DAY 1   DAY 2 DAY 3 DAY 4 DAY 5 DAY 6 DAY 7     WEEK 2-USE 3 (THREE)  TIMES A DAY DAY 1 DAY 2 DAY 3 DAY 4 DAY 5 DAY 6 DAY 7     WEEK 3-USE 2 (TWO) TIMES A DAY DAY 1 DAY 2 DAY 3 DAY 4 DAY 5 DAY 6 DAY 7     WEEK 4-USE 1 (ONE)TIME A DAY DAY 1 DAY 2 DAY 3 DAY 4 DAY 5 DAY 6 DAY 7         Ketorolac (GRAY TOP) PRESCRIBED TO YOUR PHARMACY    WEEK 1-USE 4  (FOUR) TIMES A DAY DAY 1   DAY 2 DAY 3 DAY 4 DAY 5 DAY 6 DAY 7     WEEK 2-USE 3 (THREE)  TIMES A DAY DAY 1 DAY 2 DAY 3 DAY 4 DAY 5 DAY 6 DAY 7     WEEK 3-USE 2 (TWO) TIMES A DAY DAY 1 DAY 2 DAY 3 DAY 4 DAY 5 DAY 6 DAY 7     WEEK 4-USE 1 (ONE)TIME A DAY DAY 1 DAY 2 DAY 3 DAY 4 DAY 5 DAY 6 DAY 7       Moxifloxacin (TAN TOP) PRESCRIBED TO YOUR PHARMACY    WEEK 1-USE 4  (FOUR) TIMES A DAY DAY 1   DAY 2 DAY 3 DAY 4 DAY 5 DAY 6 DAY 7       No pushing, pulling, tugging,  heavy lifting, or strenuous activity.  No major decision making, driving, or drinking alcoholic beverages for 24 hours. ( due to the medications you have  received)  Always use good hand hygiene/washing techniques.  NO driving while taking pain medications.    * if you have an incision:  Check your incision area every day for signs of infection.   Check for:  * more redness, swelling, or pain  *more fluid or blood  *warmth  *pus or bad smell    To assist you in voiding:  Drink plenty of fluids  Listen to running water while attempting to void.    If you are unable to urinate and you have an uncomfortable urge to void or it has  been   6 hours since you were discharged, return to the Emergency Room

## 2024-05-09 NOTE — OP NOTE
OPERATIVE NOTE    Date of Procedure: 5/9/2024  Patient Name: Mohsen Bucio  Patient MRN: [id number]  YOB: 1950     Preoperative Diagnosis: Left nuclear sclerotic cataract.     Postoperative Diagnosis: Left nuclear sclerotic cataract.     Procedure Performed: Phacoemulsification with implantation of a foldable posterior chamber intraocular lens, Left eye.     Surgeon: Ean Carlisle MD    Anesthesia:  Monitored Anesthesia Care (MAC)      Brief History and Indication: The patient presents with a history of past progressive loss of vision.  Patient was diagnosed with cataract and requests removal for increased ability to read and see.     Operation Description: The patient was taken to the OR and prepped and draped in the usual sterile ophthalmic fashion. A lid speculum was placed in the eye.  A #75 blade was then used to make a stab incision two o’clock hours from the intended temporal clear cornea groove. The anterior chamber was then inflated with a Viscoelastic. A metal microkeratome blade was then used to enter the anterior chamber at the temporal clear cornea site. A three level tunnel incision was made. A curvilinear capsulorrhexis was then performed with a bent cystotome needle and capsulorrhexis forceps.  BSS on a 30 gauge bent cannula was used to hydro-dissect, and hydro-delineate the lens. Good fluid waves and hydro-delineation were noted. Phacoemulsification was then used to remove nuclear material without complications. The residual cortical and lenticular material was then removed with irrigation and aspiration. Viscoelastics were then used to inflate the bag in a soft shell technique. A PCIOL was injected into the bag. Post-implantation, there were no rents or tears in the bag and the lens was noted to be stable and centered. The residual Viscoelastic was then removed with irrigation and aspiration.  The wound was checked and found to be without leaks. Therefore a Polydex ointment and  one drop of Durezol eye drop was placed in the eye.     Implant Information: * No implants in log *    Complications: None    Estimated Blood Loss:  Less than 1 cc.       []   Changed to complex procedure due to: []  hypermature, white cataract. Blue dye was used. []   small iris. A Malyugin Ring was used.    Discharge and Condition  The patient was transported to same day surgery in excellent condition and scheduled for follow-up tomorrow morning. The patient was given instructions on use of eye drops for the operative eye and was specifically instructed to call Dr. Carlisle at his office or home for any nausea, vomiting, headache, decreased visual acuity, or pain, or if the patient had any general concerns.    Ean Carlisle MD  5/9/2024

## 2024-05-09 NOTE — OP NOTE
OPERATIVE NOTE    Date of Procedure: 5/9/2024  Patient Name: Mohsen Bucio  Patient MRN: [id number]  YOB: 1950     Preoperative Diagnosis: Left nuclear sclerotic cataract.     Postoperative Diagnosis: Left nuclear sclerotic cataract.     Procedure Performed: Phacoemulsification with implantation of a foldable posterior chamber intraocular lens, Left eye.     Surgeon: Ean Carlisle MD    Anesthesia:  Monitored Anesthesia Care (MAC)      Brief History and Indication: The patient presents with a history of past progressive loss of vision.  Patient was diagnosed with cataract and requests removal for increased ability to read and see.     Operation Description: The patient was taken to the OR and prepped and draped in the usual sterile ophthalmic fashion. A lid speculum was placed in the eye.  A #75 blade was then used to make a stab incision two o’clock hours from the intended temporal clear cornea groove. The anterior chamber was then inflated with a Viscoelastic. A metal microkeratome blade was then used to enter the anterior chamber at the temporal clear cornea site. A three level tunnel incision was made. A curvilinear capsulorrhexis was then performed with a bent cystotome needle and capsulorrhexis forceps.  BSS on a 30 gauge bent cannula was used to hydro-dissect, and hydro-delineate the lens. Good fluid waves and hydro-delineation were noted. Phacoemulsification was then used to remove nuclear material without complications. The residual cortical and lenticular material was then removed with irrigation and aspiration. Viscoelastics were then used to inflate the bag in a soft shell technique. A PCIOL was injected into the bag. Post-implantation, there were no rents or tears in the bag and the lens was noted to be stable and centered. The residual Viscoelastic was then removed with irrigation and aspiration.  The wound was checked and found to be without leaks. Therefore a Polydex ointment and  one drop of Durezol eye drop was placed in the eye.     Implant Information:   Implant Name Type Inv. Item Serial No.  Lot No. LRB No. Used Action   LENS MONOFOCAL IQ [id number] - [id number] - [id number] Implant LENS MONOFOCAL IQ [id number] WANG  Left 1 Implanted       Complications: None    Estimated Blood Loss:  Less than 1 cc.       []   Changed to complex procedure due to: []  hypermature, white cataract. Blue dye was used. []   small iris. A Malyugin Ring was used.    Discharge and Condition  The patient was transported to same day surgery in excellent condition and scheduled for follow-up tomorrow morning. The patient was given instructions on use of eye drops for the operative eye and was specifically instructed to call Dr. Carlisle at his office or home for any nausea, vomiting, headache, decreased visual acuity, or pain, or if the patient had any general concerns.    Ean Carlisle MD  5/9/2024

## 2024-05-13 NOTE — TELEPHONE ENCOUNTER
From: Mohsen Bucio  To: Ronel Hyatt  Sent: 4/29/2024 9:48 AM EDT  Subject: Labs Followup    Anup Farooq's email after last labs suggested Repatha as an option for my high cholesterol. If you agree that this could help, please see if insurance would approve it. If there's something I need to do for that, let me know.    Also, I'd like to do any recommended bloodwork before my next appointment on 9/27 so we could review results then.     Thanks a [email or web address]

## 2024-05-14 ENCOUNTER — OFFICE VISIT (OUTPATIENT)
Dept: NEUROLOGY | Facility: CLINIC | Age: 74
End: 2024-05-14
Payer: MEDICARE

## 2024-05-14 VITALS
DIASTOLIC BLOOD PRESSURE: 82 MMHG | BODY MASS INDEX: 29.96 KG/M2 | SYSTOLIC BLOOD PRESSURE: 132 MMHG | HEART RATE: 84 BPM | OXYGEN SATURATION: 97 % | HEIGHT: 71 IN | TEMPERATURE: 97.3 F | WEIGHT: 214 LBS

## 2024-05-14 DIAGNOSIS — G47.33 OSA (OBSTRUCTIVE SLEEP APNEA): Primary | ICD-10-CM

## 2024-05-14 DIAGNOSIS — K21.9 GASTROESOPHAGEAL REFLUX DISEASE, UNSPECIFIED WHETHER ESOPHAGITIS PRESENT: ICD-10-CM

## 2024-05-14 PROCEDURE — 1160F RVW MEDS BY RX/DR IN RCRD: CPT | Performed by: NURSE PRACTITIONER

## 2024-05-14 PROCEDURE — 1159F MED LIST DOCD IN RCRD: CPT | Performed by: NURSE PRACTITIONER

## 2024-05-14 PROCEDURE — 3075F SYST BP GE 130 - 139MM HG: CPT | Performed by: NURSE PRACTITIONER

## 2024-05-14 PROCEDURE — 3079F DIAST BP 80-89 MM HG: CPT | Performed by: NURSE PRACTITIONER

## 2024-05-14 PROCEDURE — 99213 OFFICE O/P EST LOW 20 MIN: CPT | Performed by: NURSE PRACTITIONER

## 2024-05-14 RX ORDER — FAMOTIDINE 40 MG/1
40 TABLET, FILM COATED ORAL DAILY
Qty: 90 TABLET | Refills: 1 | Status: SHIPPED | OUTPATIENT
Start: 2024-05-14

## 2024-05-14 NOTE — PROGRESS NOTES
Follow up Sleep Patient Office Visit      Patient Name: Mohsen Bucio  : 1950   MRN: [id number]     Referring Physician: No ref. provider found    Chief Complaint:    Chief Complaint   Patient presents with    Follow-up     Patient in office to follow up on ANN.       History of Present Illness: Mohsen Bucio is a 74 y.o. male who is here today to follow up with ANN and was last seen on 2023.  Currently on AutoPap 6-16cm, average P95 pressure 6.9cm, compliance 84%, AHI 0.2/hour.  He is tolerating his pressures well.  He feels he is sleeping longer than he use to and getting better quality sleep.  He has no complaints or concerns today and feels he has been doing pretty good.   *DME company- [facility]   *Mask- Full face mask    Pertinent Medical History:   Current compliance report reviewed and has been scanned into the patient's medical record.    Subjective      Review of Systems:   Review of Systems    Medications:     Current Outpatient Medications:     albuterol 108 (90 Base) MCG/ACT inhaler, Inhale 2 puffs Every 4 (Four) Hours As Needed for Wheezing., Disp: , Rfl:     Bempedoic Acid 180 MG tablet, Take 1 tablet by mouth Daily., Disp: 90 tablet, Rfl: 3    Breo Ellipta 100-25 MCG/INH inhaler, Inhale 1 puff Daily., Disp: , Rfl:     Cyanocobalamin 2500 MCG sublingual tablet, Daily, Disp: 90 each, Rfl: 3    desloratadine (CLARINEX) 5 MG tablet, Take 1 tablet by mouth Daily As Needed., Disp: , Rfl:     Evolocumab (REPATHA) solution prefilled syringe injection, Inject 1 mL under the skin into the appropriate area as directed Every 14 (Fourteen) Days., Disp: 6 mL, Rfl: 3    famotidine (PEPCID) 40 MG tablet, Take 1 tablet by mouth Daily., Disp: 90 tablet, Rfl: 1    fluticasone (FLONASE) 50 MCG/ACT nasal spray, 2 sprays into the nostril(s) as directed by provider Daily., Disp: 16 g, Rfl: 3    lisinopril (PRINIVIL,ZESTRIL) 5 MG tablet, Take 1 tablet by mouth Daily., Disp: 90 tablet, Rfl: 3     "prednisoLONE acetate (Pred Forte) 1 % ophthalmic suspension, Administer 1 drop into the left eye 4 (Four) Times a Day., Disp: , Rfl:     Allergies:   Allergies   Allergen Reactions    Atorvastatin Other (See Comments)     Muscle cramps    Pitavastatin Other (See Comments)     Muscle cramps    Pravastatin Other (See Comments)     Muscle cramps    Rosuvastatin Other (See Comments)     Muscle cramps    Simvastatin Other (See Comments)     Muscle cramps    Zetia [Ezetimibe] Myalgia    Penicillins Other (See Comments)     Allergy testing years ago showed patient allergic       Objective     Physical Exam:  Vital Signs:   Vitals:    05/14/24 0823   BP: 132/82   BP Location: Right arm   Patient Position: Sitting   Cuff Size: Adult   Pulse: 84   Temp: 97.3 °F (36.3 °C)   SpO2: 97%   Weight: 97.1 kg (214 lb)   Height: 180.3 cm (71\")   PainSc: 0-No pain     BMI: Body mass index is 29.85 kg/m².    Physical Exam  Vitals and nursing note reviewed.   Constitutional:       General: He is not in acute distress.     Appearance: Normal appearance. He is well-developed. He is not diaphoretic.   HENT:      Head: Normocephalic and atraumatic.   Eyes:      Extraocular Movements: Extraocular movements intact.      Conjunctiva/sclera: Conjunctivae normal.   Pulmonary:      Effort: Pulmonary effort is normal. No respiratory distress.   Skin:     General: Skin is warm and dry.   Neurological:      Mental Status: He is alert and oriented to person, place, and time.   Psychiatric:         Mood and Affect: Mood normal.         Behavior: Behavior normal.         Thought Content: Thought content normal.         Judgment: Judgment normal.         Assessment / Plan      Assessment/Plan:   Diagnoses and all orders for this visit:    1. ANN (obstructive sleep apnea) (Primary)    2. BMI 29.0-29.9,adult         Follow Up:   Return in about 1 year (around 5/14/2025) for F/U Obstructive Sleep Apnea.    *Order for PAP supplies sent to patient's DME " company.     I have advised the patient the need to continue the use of CPAP.  Gold standard for treatment of sleep apnea includes weight loss, use of cpap and avoidance of alcohol.  Encouraged weight loss (if applicable) with a BMI goal of 24.  Untreated ANN may increase the risk for development of hypertension, stroke, myocardial infarction, diabetes, cardiovascular disease, work-related issues and driving accidents. I have counseled and advised the patient to avoid driving or operating heavy/dangerous equipment if feeling drowsy.     LUCITA Callejas, FNP-C  [facility]

## 2024-05-22 ENCOUNTER — PRIOR AUTHORIZATION (OUTPATIENT)
Dept: INTERNAL MEDICINE | Facility: CLINIC | Age: 74
End: 2024-05-22
Payer: MEDICARE

## 2024-06-18 ENCOUNTER — PREP FOR SURGERY (OUTPATIENT)
Dept: OTHER | Facility: HOSPITAL | Age: 74
End: 2024-06-18
Payer: MEDICARE

## 2024-06-18 DIAGNOSIS — H25.11 NUCLEAR SCLEROTIC CATARACT OF RIGHT EYE: Primary | ICD-10-CM

## 2024-06-18 RX ORDER — SODIUM CHLORIDE 0.9 % (FLUSH) 0.9 %
10 SYRINGE (ML) INJECTION EVERY 12 HOURS SCHEDULED
OUTPATIENT
Start: 2024-06-18

## 2024-06-18 RX ORDER — TETRACAINE HYDROCHLORIDE 5 MG/ML
1 SOLUTION OPHTHALMIC SEE ADMIN INSTRUCTIONS
OUTPATIENT
Start: 2024-06-18

## 2024-06-18 RX ORDER — SODIUM CHLORIDE 0.9 % (FLUSH) 0.9 %
1-10 SYRINGE (ML) INJECTION AS NEEDED
OUTPATIENT
Start: 2024-06-18

## 2024-06-18 RX ORDER — CYCLOPENT/TROPIC/PHEN/KETR/WAT 1%-1%-2.5%
1 DROPS (EA) OPHTHALMIC (EYE)
OUTPATIENT
Start: 2024-06-18 | End: 2024-06-18

## 2024-06-18 RX ORDER — SODIUM CHLORIDE 9 MG/ML
40 INJECTION, SOLUTION INTRAVENOUS AS NEEDED
OUTPATIENT
Start: 2024-06-18

## 2024-06-18 RX ORDER — PREDNISOLONE ACETATE 10 MG/ML
1 SUSPENSION/ DROPS OPHTHALMIC SEE ADMIN INSTRUCTIONS
OUTPATIENT
Start: 2024-06-18

## 2024-06-24 PROBLEM — H25.11 NUCLEAR SCLEROTIC CATARACT OF RIGHT EYE: Status: ACTIVE | Noted: 2024-06-18

## 2024-06-27 ENCOUNTER — ANESTHESIA EVENT (OUTPATIENT)
Dept: PERIOP | Facility: HOSPITAL | Age: 74
End: 2024-06-27
Payer: MEDICARE

## 2024-06-27 ENCOUNTER — ANESTHESIA (OUTPATIENT)
Dept: PERIOP | Facility: HOSPITAL | Age: 74
End: 2024-06-27
Payer: MEDICARE

## 2024-06-27 ENCOUNTER — HOSPITAL ENCOUNTER (OUTPATIENT)
Facility: HOSPITAL | Age: 74
Setting detail: HOSPITAL OUTPATIENT SURGERY
Discharge: HOME | End: 2024-06-27
Attending: OPHTHALMOLOGY | Admitting: OPHTHALMOLOGY
Payer: MEDICARE

## 2024-06-27 VITALS
WEIGHT: 215 LBS | DIASTOLIC BLOOD PRESSURE: 78 MMHG | HEIGHT: 71 IN | TEMPERATURE: 97.4 F | SYSTOLIC BLOOD PRESSURE: 123 MMHG | RESPIRATION RATE: 16 BRPM | BODY MASS INDEX: 30.1 KG/M2 | OXYGEN SATURATION: 92 % | HEART RATE: 66 BPM

## 2024-06-27 DIAGNOSIS — H25.11 NUCLEAR SCLEROTIC CATARACT OF RIGHT EYE: ICD-10-CM

## 2024-06-27 PROCEDURE — 25810000003 LACTATED RINGERS PER 1000 ML: Performed by: OPHTHALMOLOGY

## 2024-06-27 PROCEDURE — 25010000002 MIDAZOLAM PER 1MG: Performed by: NURSE ANESTHETIST, CERTIFIED REGISTERED

## 2024-06-27 PROCEDURE — V2632 POST CHMBR INTRAOCULAR LENS: HCPCS | Performed by: OPHTHALMOLOGY

## 2024-06-27 PROCEDURE — 25010000002 FENTANYL CITRATE (PF) 50 MCG/ML SOLUTION PREFILLED SYRINGE: Performed by: NURSE ANESTHETIST, CERTIFIED REGISTERED

## 2024-06-27 DEVICE — LENS MONOFOCAL IQ CC60WF215: Type: IMPLANTABLE DEVICE | Site: POSTERIOR CHAMBER | Status: FUNCTIONAL

## 2024-06-27 RX ORDER — TETRACAINE HYDROCHLORIDE 5 MG/ML
SOLUTION OPHTHALMIC AS NEEDED
Status: DISCONTINUED | OUTPATIENT
Start: 2024-06-27 | End: 2024-06-27 | Stop reason: HOSPADM

## 2024-06-27 RX ORDER — PREDNISOLONE ACETATE 10 MG/ML
1 SUSPENSION/ DROPS OPHTHALMIC 4 TIMES DAILY
Start: 2024-06-27

## 2024-06-27 RX ORDER — ACETAZOLAMIDE 500 MG/1
500 CAPSULE, EXTENDED RELEASE ORAL ONCE
Status: COMPLETED | OUTPATIENT
Start: 2024-06-27 | End: 2024-06-27

## 2024-06-27 RX ORDER — FENTANYL CITRATE 50 UG/ML
INJECTION, SOLUTION INTRAMUSCULAR; INTRAVENOUS AS NEEDED
Status: DISCONTINUED | OUTPATIENT
Start: 2024-06-27 | End: 2024-06-27 | Stop reason: SURG

## 2024-06-27 RX ORDER — SODIUM CHLORIDE 9 MG/ML
40 INJECTION, SOLUTION INTRAVENOUS AS NEEDED
Status: DISCONTINUED | OUTPATIENT
Start: 2024-06-27 | End: 2024-06-27 | Stop reason: HOSPADM

## 2024-06-27 RX ORDER — LIDOCAINE HYDROCHLORIDE 40 MG/ML
INJECTION, SOLUTION RETROBULBAR AS NEEDED
Status: DISCONTINUED | OUTPATIENT
Start: 2024-06-27 | End: 2024-06-27 | Stop reason: HOSPADM

## 2024-06-27 RX ORDER — TETRACAINE HYDROCHLORIDE 5 MG/ML
1 SOLUTION OPHTHALMIC SEE ADMIN INSTRUCTIONS
Status: COMPLETED | OUTPATIENT
Start: 2024-06-27 | End: 2024-06-27

## 2024-06-27 RX ORDER — NEOMYCIN SULFATE, POLYMYXIN B SULFATE, AND DEXAMETHASONE 3.5; 10000; 1 MG/G; [USP'U]/G; MG/G
OINTMENT OPHTHALMIC AS NEEDED
Status: DISCONTINUED | OUTPATIENT
Start: 2024-06-27 | End: 2024-06-27 | Stop reason: HOSPADM

## 2024-06-27 RX ORDER — SODIUM CHLORIDE, SODIUM LACTATE, POTASSIUM CHLORIDE, CALCIUM CHLORIDE 600; 310; 30; 20 MG/100ML; MG/100ML; MG/100ML; MG/100ML
1000 INJECTION, SOLUTION INTRAVENOUS CONTINUOUS
Status: DISCONTINUED | OUTPATIENT
Start: 2024-06-27 | End: 2024-06-27 | Stop reason: HOSPADM

## 2024-06-27 RX ORDER — PREDNISOLONE ACETATE 10 MG/ML
SUSPENSION/ DROPS OPHTHALMIC AS NEEDED
Status: DISCONTINUED | OUTPATIENT
Start: 2024-06-27 | End: 2024-06-27 | Stop reason: HOSPADM

## 2024-06-27 RX ORDER — BALANCED SALT SOLUTION 6.4; .75; .48; .3; 3.9; 1.7 MG/ML; MG/ML; MG/ML; MG/ML; MG/ML; MG/ML
SOLUTION OPHTHALMIC AS NEEDED
Status: DISCONTINUED | OUTPATIENT
Start: 2024-06-27 | End: 2024-06-27 | Stop reason: HOSPADM

## 2024-06-27 RX ORDER — SODIUM CHLORIDE 0.9 % (FLUSH) 0.9 %
10 SYRINGE (ML) INJECTION EVERY 12 HOURS SCHEDULED
Status: DISCONTINUED | OUTPATIENT
Start: 2024-06-27 | End: 2024-06-27 | Stop reason: HOSPADM

## 2024-06-27 RX ORDER — PREDNISOLONE ACETATE 10 MG/ML
1 SUSPENSION/ DROPS OPHTHALMIC SEE ADMIN INSTRUCTIONS
Status: DISCONTINUED | OUTPATIENT
Start: 2024-06-27 | End: 2024-06-27 | Stop reason: HOSPADM

## 2024-06-27 RX ORDER — SODIUM CHLORIDE 0.9 % (FLUSH) 0.9 %
1-10 SYRINGE (ML) INJECTION AS NEEDED
Status: DISCONTINUED | OUTPATIENT
Start: 2024-06-27 | End: 2024-06-27 | Stop reason: HOSPADM

## 2024-06-27 RX ORDER — CYCLOPENT/TROPIC/PHEN/KETR/WAT 1%-1%-2.5%
1 DROPS (EA) OPHTHALMIC (EYE)
Status: COMPLETED | OUTPATIENT
Start: 2024-06-27 | End: 2024-06-27

## 2024-06-27 RX ORDER — MIDAZOLAM HYDROCHLORIDE 2 MG/2ML
INJECTION, SOLUTION INTRAMUSCULAR; INTRAVENOUS AS NEEDED
Status: DISCONTINUED | OUTPATIENT
Start: 2024-06-27 | End: 2024-06-27 | Stop reason: SURG

## 2024-06-27 RX ADMIN — MIDAZOLAM HYDROCHLORIDE 2 MG: 1 INJECTION, SOLUTION INTRAMUSCULAR; INTRAVENOUS at 11:36

## 2024-06-27 RX ADMIN — FENTANYL CITRATE 50 MCG: 50 INJECTION, SOLUTION INTRAMUSCULAR; INTRAVENOUS at 11:36

## 2024-06-27 RX ADMIN — ACETAZOLAMIDE 500 MG: 500 CAPSULE, EXTENDED RELEASE ORAL at 11:57

## 2024-06-27 RX ADMIN — SODIUM CHLORIDE, POTASSIUM CHLORIDE, SODIUM LACTATE AND CALCIUM CHLORIDE 1000 ML: 600; 310; 30; 20 INJECTION, SOLUTION INTRAVENOUS at 10:39

## 2024-06-27 RX ADMIN — Medication 1 DROP: at 10:35

## 2024-06-27 RX ADMIN — TETRACAINE HYDROCHLORIDE 1 DROP: 5 SOLUTION OPHTHALMIC at 10:34

## 2024-06-27 RX ADMIN — Medication 1 DROP: at 10:45

## 2024-06-27 RX ADMIN — TETRACAINE HYDROCHLORIDE 1 DROP: 5 SOLUTION OPHTHALMIC at 10:33

## 2024-06-27 RX ADMIN — Medication 1 DROP: at 10:40

## 2024-06-27 NOTE — OP NOTE
OPERATIVE NOTE    Date of Procedure: 6/27/2024  Patient Name: Mohsen Bucio  Patient MRN: [id number]  YOB: 1950     Preoperative Diagnosis: Right nuclear sclerotic cataract.     Postoperative Diagnosis: Right nuclear sclerotic cataract.     Procedure Performed: Phacoemulsification with implantation of a foldable posterior chamber intraocular lens, Right eye.     Surgeon: Ean Carlisle MD    Anesthesia:  Monitored Anesthesia Care (MAC)      Brief History and Indication: The patient presents with a history of past progressive loss of vision.  Patient was diagnosed with cataract and requests removal for increased ability to read and see.     Operation Description: The patient was taken to the OR and prepped and draped in the usual sterile ophthalmic fashion. A lid speculum was placed in the eye.  A #75 blade was then used to make a stab incision two o’clock hours from the intended temporal clear cornea groove. The anterior chamber was then inflated with a Viscoelastic. A metal microkeratome blade was then used to enter the anterior chamber at the temporal clear cornea site. A three level tunnel incision was made. A curvilinear capsulorrhexis was then performed with a bent cystotome needle and capsulorrhexis forceps.  BSS on a 30 gauge bent cannula was used to hydro-dissect, and hydro-delineate the lens. Good fluid waves and hydro-delineation were noted. Phacoemulsification was then used to remove nuclear material without complications. The residual cortical and lenticular material was then removed with irrigation and aspiration. Viscoelastics were then used to inflate the bag in a soft shell technique. A PCIOL was injected into the bag. Post-implantation, there were no rents or tears in the bag and the lens was noted to be stable and centered. The residual Viscoelastic was then removed with irrigation and aspiration.  The wound was checked and found to be without leaks. Therefore a Polydex ointment  and one drop of Durezol eye drop was placed in the eye.     Implant Information:   Implant Name Type Inv. Item Serial No.  Lot No. LRB No. Used Action   LENS MONOFOCAL IQ [id number] - [id number] - [id number] Implant LENS MONOFOCAL IQ [id number] WANG  Right 1 Implanted       Complications: None    Estimated Blood Loss:  Less than 1 cc.       []   Changed to complex procedure due to: []  hypermature, white cataract. Blue dye was used. []   small iris. A Malyugin Ring was used.    Discharge and Condition  The patient was transported to same day surgery in excellent condition and scheduled for follow-up tomorrow morning. The patient was given instructions on use of eye drops for the operative eye and was specifically instructed to call Dr. Carlisle at his office or home for any nausea, vomiting, headache, decreased visual acuity, or pain, or if the patient had any general concerns.    Ean Carlisle MD  6/27/2024

## 2024-06-27 NOTE — ANESTHESIA PREPROCEDURE EVALUATION
Anesthesia Evaluation     Patient summary reviewed and Nursing notes reviewed   no history of anesthetic complications:   NPO Solid Status: > 8 hours  NPO Liquid Status: > 2 hours           Airway   Mallampati: II  TM distance: >3 FB  Neck ROM: full  Possible difficult intubation  Dental - normal exam     Pulmonary    (+) asthma,shortness of breath, sleep apnea on CPAP, decreased breath sounds  (-) not a smoker  Cardiovascular - normal exam    (+) hypertension, CAD, angina, SABA, PVD, hyperlipidemia      Neuro/Psych- negative ROS  GI/Hepatic/Renal/Endo    (+) GERD, renal disease (stage 2 kidney dz)-, thyroid problem (goiter/ graves dz) hypothyroidism, hyperthyroidism and thyroid nodules    Musculoskeletal     (+) arthralgias, myalgias  Abdominal   (+) obese   Substance History   (+) alcohol use     OB/GYN negative ob/gyn ROS         Other   arthritis,     ROS/Med Hx Other: Labs reviewed   2022 EKG st   2022 echo 1.  Normal left ventricular size and systolic function, LVEF 60-65%.  2.  Grade 1 diastolic dysfunction.  3.  Mild concentric LVH.  4.  Normal left atrial volume index  5.  Normal right ventricular size and systolic function.  6.  No significant valvular abnormalities.                       Anesthesia Plan    ASA 3     MAC     (Risks and benefits discussed including risk of aspiration, recall and dental damage. All patient questions answered.    Will continue with plan of care.)  intravenous induction     Anesthetic plan, risks, benefits, and alternatives have been provided, discussed and informed consent has been obtained with: patient.  Pre-procedure education provided  Plan discussed with CRNA.      CODE STATUS:

## 2024-06-27 NOTE — DISCHARGE INSTRUCTIONS
[facility], [facility]  [address] [place] [address]  (P): [phone]           (F): [phone]    Mohsen Bucio  PATIENT NAME:__________________________________    Right Eye    POST OPERATIVE INSTRUCTIONS    You have received anesthesia today. DO NOT drive, drink alcohol, sign legal documents.   After surgery, your eye will not hurt. It may feel scratchy, sticky or uncomfortable. Your eye will be sensitive to light.  Most people see better 1-3 days after the procedure, but it could take 3 weeks to get the full benefits and reach your visual potential. If your vision is blurry for a few days it is normal, and means you may have swelling outside or inside the eye. For some patients, a bubble is placed and there will be blurriness.   You should receive a post-op kit with tape and an eye shield. Wear the shield for the first 3 nights after surgery to keep you from rubbing the eye.  Most people are able to return to their normal routine 1-3 days after surgery, however, due to the brain adjusting to your new vision you may have trouble judging distances and want to be careful when driving and going up and downstairs.   You can shower and wash your hair the day after surgery. Keep water, shampoo, hair spray and shaving lotion out of the eye, especially for the first week.  During the first week, you should AVOID:   Rubbing or putting pressure on your eye.  Eye make-up, face cream or lotion, hair coloring or perms  Strenuous activities, such as running or lifting weights, as to avoid sweat from getting in the eye. Avoid swimming, hot tubs, fumes or raymond conditions.   Keep your head above your heart (no hanging the head down for periods of time).  Some discomfort and blurred vision after surgery are normal, but if you have any unusual pain, swelling, bleeding or sudden decrease in vision, contact our office immediately. Emergency assistance is available at any time by calling:    Dr. Ean Jefferson Dr.  Pratibha Carlisle    [phone]    If unable to reach call [facility] @ [phone]      POST OPERATIVE DROP INSTRUCTIONS  You have been prescribed eye drops to use after surgery, please follow these instructions:  PLACE A MACHO IN THE DAY COLUMN EACH TIME YOU USE TO KEEP ON SCHEDULE. WAIT 5 MINUTES IN BETWEEN DROPS    Prednisolone (PINK TOP) SHAKE WELL BEFORE USE   GIVEN TO YOU BY THE HOSPITAL    WEEK 1-USE 4  (FOUR) TIMES A DAY DAY 1   DAY 2 DAY 3 DAY 4 DAY 5 DAY 6 DAY 7     WEEK 2-USE 3 (THREE)  TIMES A DAY DAY 1 DAY 2 DAY 3 DAY 4 DAY 5 DAY 6 DAY 7     WEEK 3-USE 2 (TWO) TIMES A DAY DAY 1 DAY 2 DAY 3 DAY 4 DAY 5 DAY 6 DAY 7     WEEK 4-USE 1 (ONE)TIME A DAY DAY 1 DAY 2 DAY 3 DAY 4 DAY 5 DAY 6 DAY 7         Ketorolac (GRAY TOP) PRESCRIBED TO YOUR PHARMACY    WEEK 1-USE 4  (FOUR) TIMES A DAY DAY 1   DAY 2 DAY 3 DAY 4 DAY 5 DAY 6 DAY 7     WEEK 2-USE 3 (THREE)  TIMES A DAY DAY 1 DAY 2 DAY 3 DAY 4 DAY 5 DAY 6 DAY 7     WEEK 3-USE 2 (TWO) TIMES A DAY DAY 1 DAY 2 DAY 3 DAY 4 DAY 5 DAY 6 DAY 7     WEEK 4-USE 1 (ONE)TIME A DAY DAY 1 DAY 2 DAY 3 DAY 4 DAY 5 DAY 6 DAY 7       Moxifloxacin (TAN TOP) PRESCRIBED TO YOUR PHARMACY    WEEK 1-USE 4  (FOUR) TIMES A DAY DAY 1   DAY 2 DAY 3 DAY 4 DAY 5 DAY 6 DAY 7     Please follow all post op instructions and follow up appointment time from your physician's office included in your discharge packet.  . No pushing, pulling, tugging,  heavy lifting, or strenuous activity.  No major decision making, driving, or drinking alcoholic beverages for 24 hours. ( due to the medications you have  received)  Always use good hand hygiene/washing techniques.  NO driving while taking pain medications.    * if you have an incision:  Check your incision area every day for signs of infection.   Check for:  * more redness, swelling, or pain  *more fluid or blood  *warmth  *pus or bad smell    To assist you in voiding:  Drink plenty of  fluids  Listen to running water while attempting to void.    If you are unable to urinate and you have an uncomfortable urge to void or it has been   6 hours since you were discharged, return to the Emergency Room

## 2024-06-27 NOTE — H&P
[facility]         History and Physical    Patient Name: Mohsen Bucio  MRN: [id number]  : 1950  Gender: male     HPI: Patient complaint of PPLOV Right eye diagnosed with cataract. Patient requests PHACO PCIOL for Increase of VA/ADL.    History:    Past Medical History:   Diagnosis Date    Allergic 1965    Pollen, weeds, mold, dust    Asthma     Bronchitis     Bursitis     Chest pain     pt denies 23    Colon polyp     colonoscopy     Degenerative disc disease, cervical     GERD (gastroesophageal reflux disease)     Graves disease     Hyperlipidemia     Hypertension     Hyperthyroidism     Myalgia     Myositis     Neuroma of foot     PAD (peripheral artery disease) 2024    Sleep apnea     Use CPAP machine    Stage 2 chronic kidney disease 2023    Tennis elbow     Urinary tract infection        Past Surgical History:   Procedure Laterality Date    ADENOIDECTOMY  1969    APPENDECTOMY      CATARACT EXTRACTION W/ INTRAOCULAR LENS IMPLANT Left 2024    Procedure: CATARACT PHACO EXTRACTION WITH INTRAOCULAR LENS IMPLANT LEFT;  Surgeon: Ean Carlisle MD;  Location: [facility] OR;  Service: Ophthalmology;  Laterality: Left;    COLONOSCOPY  2016    COLONOSCOPY N/A 8/15/2023    Procedure: COLONOSCOPY WITH POLYPECTOMY;  Surgeon: Kennedy Canas MD;  Location: [facility] ENDOSCOPY;  Service: Gastroenterology;  Laterality: N/A;    INGUINAL HERNIA REPAIR Left     x2 no mesh    TONSILLECTOMY         Social History     Socioeconomic History    Marital status:    Tobacco Use    Smoking status: Never     Passive exposure: Never    Smokeless tobacco: Never   Vaping Use    Vaping status: Never Used   Substance and Sexual Activity    Alcohol use: Yes     Comment: occ    Drug use: Never    Sexual activity: Yes     Partners: Female     Birth control/protection: Post-menopausal       Family History   Problem Relation Age of Onset     Benign prostatic hyperplasia Father     Stroke Father     Cancer Father         lung    Hyperlipidemia Father     Colon cancer Paternal Grandfather         diagnosed mid 70's    Stroke Other     Cancer Other        Prior to Admission Medications:  Medications Prior to Admission   Medication Sig Dispense Refill Last Dose    albuterol 108 (90 Base) MCG/ACT inhaler Inhale 2 puffs Every 4 (Four) Hours As Needed for Wheezing.   6/26/2024    Bempedoic Acid 180 MG tablet Take 1 tablet by mouth Daily. 90 tablet 3 6/26/2024    Breo Ellipta 100-25 MCG/INH inhaler Inhale 1 puff Daily.   6/26/2024    Cyanocobalamin 2500 MCG sublingual tablet Daily 90 each 3 6/26/2024    desloratadine (CLARINEX) 5 MG tablet Take 1 tablet by mouth Daily As Needed.   6/26/2024    Evolocumab (REPATHA) solution prefilled syringe injection Inject 1 mL under the skin into the appropriate area as directed Every 14 (Fourteen) Days. 6 mL 3 Past Month    famotidine (PEPCID) 40 MG tablet TAKE 1 TABLET BY MOUTH DAILY 90 tablet 1 6/26/2024    fluticasone (FLONASE) 50 MCG/ACT nasal spray 2 sprays into the nostril(s) as directed by provider Daily. 16 g 3 6/26/2024    lisinopril (PRINIVIL,ZESTRIL) 5 MG tablet Take 1 tablet by mouth Daily. 90 tablet 3 6/26/2024    prednisoLONE acetate (Pred Forte) 1 % ophthalmic suspension Administer 1 drop into the left eye 4 (Four) Times a Day.   6/26/2024       Allergies:  Allergies   Allergen Reactions    Atorvastatin Other (See Comments)     Muscle cramps    Pitavastatin Other (See Comments)     Muscle cramps    Pravastatin Other (See Comments)     Muscle cramps    Rosuvastatin Other (See Comments)     Muscle cramps    Simvastatin Other (See Comments)     Muscle cramps    Zetia [Ezetimibe] Myalgia    Penicillins Other (See Comments)     Allergy testing years ago showed patient allergic        Vitals: Temp:  [98 °F (36.7 °C)] 98 °F (36.7 °C)  Heart Rate:  [68] 68  Resp:  [16] 16  BP: (125)/(77) 125/77    Review of  Systems:   Within Normal Limits Abnormal   HEENT [x]    []     Cardiovascular [x]   []     Gastrointestinal [x]   []     Genitourinary [x]   []     Neurologic [x]   []     Pulmonary [x]   []       Physical Exam:   Within Normal Limits Abnormal   HEENT [x]    []     Heart [x]   []     Lungs [x]   []     Abdomen [x]   []     Extremities [x]   []       Impression: Right nuclear sclerotic cataract.     Plan: CATARACT PHACO EXTRACTION WITH INTRAOCULAR LENS IMPLANT RIGHT (Right)     Ean Carlisle MD  6/27/2024

## 2024-06-27 NOTE — ANESTHESIA POSTPROCEDURE EVALUATION
Patient: Mohsen Bucio    Procedure Summary       Date: 06/27/24 Room / Location: [facility] OR 3 /  [facility]    Anesthesia Start: 1136 Anesthesia Stop: 1146    Procedure: CATARACT PHACO EXTRACTION WITH INTRAOCULAR LENS IMPLANT RIGHT (Right: Eye) Diagnosis:       Nuclear sclerotic cataract of right eye      (Nuclear sclerotic cataract of right eye [H25.11])    Surgeons: Ean Carlisle MD Provider: Antonio Herrera CRNA    Anesthesia Type: MAC ASA Status: 3            Anesthesia Type: MAC    Vitals  No vitals data found for the desired time range.          Post Anesthesia Care and Evaluation    Patient location during evaluation: bedside  Patient participation: complete - patient participated  Level of consciousness: awake  Pain score: 0  Pain management: adequate    Airway patency: patent  Anesthetic complications: No anesthetic complications  PONV Status: controlled  Cardiovascular status: acceptable and stable  Respiratory status: acceptable and room air  Hydration status: acceptable    Comments: Vital signs as noted in nursing documentation as per protocol

## 2024-08-08 ENCOUNTER — OFFICE VISIT (OUTPATIENT)
Dept: CARDIOLOGY | Facility: CLINIC | Age: 74
End: 2024-08-08
Payer: MEDICARE

## 2024-08-08 VITALS
SYSTOLIC BLOOD PRESSURE: 128 MMHG | HEIGHT: 71 IN | WEIGHT: 218.9 LBS | DIASTOLIC BLOOD PRESSURE: 88 MMHG | RESPIRATION RATE: 18 BRPM | HEART RATE: 88 BPM | BODY MASS INDEX: 30.65 KG/M2 | OXYGEN SATURATION: 96 %

## 2024-08-08 DIAGNOSIS — I10 PRIMARY HYPERTENSION: Primary | ICD-10-CM

## 2024-08-08 DIAGNOSIS — I49.9 IRREGULAR HEART RATE: ICD-10-CM

## 2024-08-08 DIAGNOSIS — I73.9 PAD (PERIPHERAL ARTERY DISEASE): ICD-10-CM

## 2024-08-08 DIAGNOSIS — E78.2 MIXED HYPERLIPIDEMIA: ICD-10-CM

## 2024-08-08 PROBLEM — R94.31 ABNORMAL EKG: Status: ACTIVE | Noted: 2024-08-08

## 2024-08-08 RX ORDER — SODIUM, POTASSIUM,MAG SULFATES 17.5-3.13G
SOLUTION, RECONSTITUTED, ORAL ORAL
COMMUNITY
Start: 2024-08-01

## 2024-08-08 NOTE — PROGRESS NOTES
[facility]    Office Consult     Mohsen Bucio  [id number]  2024    Referred By: No ref. provider found    Chief Complaint   Patient presents with    Hypertension    Extremity Weakness       Subjective     History of Present Illness:   Mohsen Bucio is a 74 y.o. male who presents to the Cardiology Clinic for evaluation of palpitations.  Patient has a past medical history significant for hypertension, asthma, chronic kidney disease, and Graves' disease.  He does not have any significant past cardiac history.  He initially presented the cardiology clinic for evaluation of occasional palpitations in .  A subsequent 14-day outpatient cardiac monitor in showed occasional supraventricular and ventricular ectopy, which was asymptomatic.   Due to recurrent palpitations, he underwent repeat cardiac monitoring in  with no significant sustained arrhythmias. He presents to the clinic today with noted abnormal EKG readings on his apple watch over the past couple weeks.  He also states his blood pressure has been up at times with a headache.  He states he has noticed it took longer for his heart rate to return to normal after doing his walking.  He denies any palpitations, shortness of breath, or lower extremity swelling.  He has no other complaints.    Past Cardiac Testin. Last Coronary Angio: None  2. Prior Stress Testin, reportedly unremarkable  3. Last Echo:               1.  Normal left ventricular size and systolic function, LVEF 60-65%.              2.  Grade 1 diastolic dysfunction.              3.  Mild concentric LVH.              4.  Normal left atrial volume index              5.  Normal right ventricular size and systolic function.              6.  No significant valvular abnormalities.  4. Prior Holter Monitor:               1.  The predominant rhythm is sinus rhythm with an average heart rate 74 bpm.              2.  Normal atrioventricular  "conduction.              3.  No sustained arrhythmias.              4.  Rare ventricular ectopy including limited NSVT.              5.  One 8.6-second run of tachycardia, cannot rule out brief nonsustained atrial fibrillation.              6.  Symptom diary submitted with 7 symptomatic episodes including \"fluttering and lightheadedness\" corresponding to NSR    Review of Systems   Constitutional:  Negative for activity change and fatigue.   Respiratory:  Negative for chest tightness and shortness of breath.    Cardiovascular:  Negative for chest pain, palpitations and leg swelling.   Neurological:  Negative for dizziness.   All other systems reviewed and are negative.       Past Medical History:   Diagnosis Date    Abnormal EKG 8/8/2024    Allergic 1965    Pollen, weeds, mold, dust    Asthma     Bronchitis     Bursitis     Chest pain     pt denies 8/11/23    Colon polyp 2016    colonoscopy 2016    Degenerative disc disease, cervical     GERD (gastroesophageal reflux disease) 2019    Graves disease     Hyperlipidemia     Hypertension March 20211    Hyperthyroidism     Myalgia     Myositis     Neuroma of foot     PAD (peripheral artery disease) 04/11/2024    Sleep apnea 2021    Use CPAP machine    Stage 2 chronic kidney disease 01/17/2023    Tennis elbow     Urinary tract infection 2013       Past Surgical History:   Procedure Laterality Date    ADENOIDECTOMY  1969    APPENDECTOMY      CATARACT EXTRACTION W/ INTRAOCULAR LENS IMPLANT Left 5/9/2024    Procedure: CATARACT PHACO EXTRACTION WITH INTRAOCULAR LENS IMPLANT LEFT;  Surgeon: Ean Carlisle MD;  Location: [facility] OR;  Service: Ophthalmology;  Laterality: Left;    CATARACT EXTRACTION W/ INTRAOCULAR LENS IMPLANT Right 6/27/2024    Procedure: CATARACT PHACO EXTRACTION WITH INTRAOCULAR LENS IMPLANT RIGHT;  Surgeon: Ean Carlisle MD;  Location: [facility] OR;  Service: Ophthalmology;  Laterality: Right;    COLONOSCOPY  07/28/2016    COLONOSCOPY N/A 8/15/2023 "    Procedure: COLONOSCOPY WITH POLYPECTOMY;  Surgeon: Kennedy Canas MD;  Location: [facility] ENDOSCOPY;  Service: Gastroenterology;  Laterality: N/A;    INGUINAL HERNIA REPAIR Left     x2 no mesh    TONSILLECTOMY         Family History   Problem Relation Age of Onset    Benign prostatic hyperplasia Father     Stroke Father     Cancer Father         lung    Hyperlipidemia Father     Colon cancer Paternal Grandfather         diagnosed mid 70's    Stroke Other     Cancer Other        Social History     Socioeconomic History    Marital status:    Tobacco Use    Smoking status: Never     Passive exposure: Never    Smokeless tobacco: Never   Vaping Use    Vaping status: Never Used   Substance and Sexual Activity    Alcohol use: Yes     Comment: occ    Drug use: Never    Sexual activity: Yes     Partners: Female     Birth control/protection: Post-menopausal         Current Outpatient Medications:     albuterol 108 (90 Base) MCG/ACT inhaler, Inhale 2 puffs Every 4 (Four) Hours As Needed for Wheezing., Disp: , Rfl:     Bempedoic Acid 180 MG tablet, Take 1 tablet by mouth Daily., Disp: 90 tablet, Rfl: 3    Breo Ellipta 100-25 MCG/INH inhaler, Inhale 1 puff Daily., Disp: , Rfl:     Cyanocobalamin 2500 MCG sublingual tablet, Daily, Disp: 90 each, Rfl: 3    desloratadine (CLARINEX) 5 MG tablet, Take 1 tablet by mouth Daily As Needed., Disp: , Rfl:     Evolocumab (REPATHA) solution prefilled syringe injection, Inject 1 mL under the skin into the appropriate area as directed Every 14 (Fourteen) Days., Disp: 6 mL, Rfl: 3    famotidine (PEPCID) 40 MG tablet, TAKE 1 TABLET BY MOUTH DAILY, Disp: 90 tablet, Rfl: 1    fluticasone (FLONASE) 50 MCG/ACT nasal spray, 2 sprays into the nostril(s) as directed by provider Daily., Disp: 16 g, Rfl: 3    lisinopril (PRINIVIL,ZESTRIL) 5 MG tablet, Take 1 tablet by mouth Daily., Disp: 90 tablet, Rfl: 3    sodium-potassium-magnesium sulfates (SUPREP) 17.5-3.13-1.6 GM/177ML solution oral  "solution, , Disp: , Rfl:       Allergies   Allergen Reactions    Atorvastatin Other (See Comments)     Muscle cramps    Pitavastatin Other (See Comments)     Muscle cramps    Pravastatin Other (See Comments)     Muscle cramps    Rosuvastatin Other (See Comments)     Muscle cramps    Simvastatin Other (See Comments)     Muscle cramps    Zetia [Ezetimibe] Myalgia    Penicillins Other (See Comments)     Allergy testing years ago showed patient allergic       Objective     Vitals:    08/08/24 1255   BP: 128/88   BP Location: Right arm   Patient Position: Sitting   Cuff Size: Adult   Pulse: 88   Resp: 18   SpO2: 96%   Weight: 99.3 kg (218 lb 14.4 oz)   Height: 180.3 cm (71\")     Body mass index is 30.53 kg/m².    Physical Exam  Vitals and nursing note reviewed.   Constitutional:       General: He is not in acute distress.     Appearance: Normal appearance. He is not toxic-appearing.   HENT:      Head: Normocephalic.      Mouth/Throat:      Mouth: Mucous membranes are moist.   Eyes:      Pupils: Pupils are equal, round, and reactive to light.   Cardiovascular:      Rate and Rhythm: Normal rate and regular rhythm.      Pulses: Normal pulses.      Heart sounds: Normal heart sounds. No murmur heard.  Pulmonary:      Effort: Pulmonary effort is normal.      Breath sounds: Normal breath sounds. No wheezing, rhonchi or rales.   Musculoskeletal:      Right lower leg: No edema.      Left lower leg: No edema.   Skin:     General: Skin is warm and dry.      Capillary Refill: Capillary refill takes less than 2 seconds.   Neurological:      Mental Status: He is alert and oriented to person, place, and time. Mental status is at baseline.   Psychiatric:         Mood and Affect: Mood normal.         Behavior: Behavior normal.         Thought Content: Thought content normal.         Results Review:   I reviewed the patient's new clinical results.      ECG 12 Lead    Date/Time: 8/8/2024 1:44 PM  Performed by: Jessica Zavala, " LUCITA    Authorized by: Jessica Zavala APRN  Comparison: not compared with previous ECG   Rhythm: sinus rhythm  Rate: normal  QRS axis: normal    Clinical impression: normal ECG          Assessment & Plan  Primary hypertension  -Blood pressure well controlled today  -Advised to check blood pressure and keep log to bring to follow up  -Discussed calling office with any consistent readings >150/100 and we could adjust his medications if needed  PAD (peripheral artery disease)  -JEANINE performed in 12/2023 and noted patent arteries with mild atherosclerosis likely due to vascular calcifications  -Continue Repatha  -Continue exercise regimen    Mixed hyperlipidemia   -Intolerant of statins  -Currently taking Repatha  -Lipid panel 3/2024 noted total cholesterol-228, triglycerides-163, LDL-160  -LDL goal <100  -Managed by PCP  Irregular heart rate  -EKG today noted NSR  -Apple watch noted EKG readings of atrial fibrillation  -Asymptomatic  -Will place 30 day MCOT to assess for any underlying atrial fibrillation  -Follow up in 6 weeks with Dr. Hodge to discuss results      Orders Placed This Encounter   Procedures    Mobile Cardiac Outpatient Telemetry    ECG 12 Lead            Preventative Cardiology:   Tobacco Cessation: N/A   Advance Care Planning: ACP discussion was held with the patient during this visit. Patient does not have an advance directive, declines further assistance.     Follow Up:   Return in about 6 weeks (around 9/19/2024) for Next scheduled follow up--Dr. Hodge to discuss testing results.      Thank you for allowing me to participate in the care of your patient. Please to not hesitate to contact me with additional questions or concerns.     LUCITA Hartman

## 2024-08-08 NOTE — ASSESSMENT & PLAN NOTE
-Intolerant of statins  -Currently taking Repatha  -Lipid panel 3/2024 noted total cholesterol-228, triglycerides-163, LDL-160  -LDL goal <100  -Managed by PCP   No

## 2024-08-08 NOTE — ASSESSMENT & PLAN NOTE
-JEANINE performed in 12/2023 and noted patent arteries with mild atherosclerosis likely due to vascular calcifications  -Continue Repatha  -Continue exercise regimen

## 2024-08-08 NOTE — ASSESSMENT & PLAN NOTE
-Blood pressure well controlled today  -Advised to check blood pressure and keep log to bring to follow up  -Discussed calling office with any consistent readings >150/100 and we could adjust his medications if needed

## 2024-08-23 DIAGNOSIS — Z86.010 PERSONAL HISTORY OF COLONIC POLYPS: Primary | ICD-10-CM

## 2024-08-23 RX ORDER — SODIUM CHLORIDE 9 MG/ML
70 INJECTION, SOLUTION INTRAVENOUS CONTINUOUS PRN
OUTPATIENT
Start: 2024-08-23

## 2024-08-28 ENCOUNTER — HOSPITAL ENCOUNTER (OUTPATIENT)
Facility: HOSPITAL | Age: 74
Setting detail: HOSPITAL OUTPATIENT SURGERY
Discharge: HOME | End: 2024-08-28
Attending: INTERNAL MEDICINE | Admitting: INTERNAL MEDICINE
Payer: MEDICARE

## 2024-08-28 ENCOUNTER — ANESTHESIA (OUTPATIENT)
Dept: GASTROENTEROLOGY | Facility: HOSPITAL | Age: 74
End: 2024-08-28
Payer: MEDICARE

## 2024-08-28 ENCOUNTER — ANESTHESIA EVENT (OUTPATIENT)
Dept: GASTROENTEROLOGY | Facility: HOSPITAL | Age: 74
End: 2024-08-28
Payer: MEDICARE

## 2024-08-28 VITALS
WEIGHT: 215 LBS | OXYGEN SATURATION: 95 % | DIASTOLIC BLOOD PRESSURE: 82 MMHG | TEMPERATURE: 99.9 F | RESPIRATION RATE: 14 BRPM | BODY MASS INDEX: 30.1 KG/M2 | SYSTOLIC BLOOD PRESSURE: 124 MMHG | HEIGHT: 71 IN | HEART RATE: 85 BPM

## 2024-08-28 DIAGNOSIS — Z86.010 PERSONAL HISTORY OF COLONIC POLYPS: ICD-10-CM

## 2024-08-28 DIAGNOSIS — K21.9 GASTROESOPHAGEAL REFLUX DISEASE WITHOUT ESOPHAGITIS: ICD-10-CM

## 2024-08-28 PROCEDURE — 43239 EGD BIOPSY SINGLE/MULTIPLE: CPT | Performed by: INTERNAL MEDICINE

## 2024-08-28 PROCEDURE — 25810000003 SODIUM CHLORIDE 0.9 % SOLUTION: Performed by: INTERNAL MEDICINE

## 2024-08-28 PROCEDURE — 25010000002 GLYCOPYRROLATE PF 0.4 MG/2ML SOLUTION: Performed by: NURSE ANESTHETIST, CERTIFIED REGISTERED

## 2024-08-28 PROCEDURE — 25010000002 PROPOFOL 10 MG/ML EMULSION: Performed by: NURSE ANESTHETIST, CERTIFIED REGISTERED

## 2024-08-28 PROCEDURE — 45385 COLONOSCOPY W/LESION REMOVAL: CPT | Performed by: INTERNAL MEDICINE

## 2024-08-28 RX ORDER — SIMETHICONE 40MG/0.6ML
SUSPENSION, DROPS(FINAL DOSAGE FORM)(ML) ORAL AS NEEDED
Status: DISCONTINUED | OUTPATIENT
Start: 2024-08-28 | End: 2024-08-28 | Stop reason: HOSPADM

## 2024-08-28 RX ORDER — PANTOPRAZOLE SODIUM 40 MG/1
40 TABLET, DELAYED RELEASE ORAL DAILY
Qty: 30 TABLET | Refills: 5 | Status: SHIPPED | OUTPATIENT
Start: 2024-08-28

## 2024-08-28 RX ORDER — ONDANSETRON 2 MG/ML
4 INJECTION INTRAMUSCULAR; INTRAVENOUS ONCE AS NEEDED
Status: CANCELLED | OUTPATIENT
Start: 2024-08-28

## 2024-08-28 RX ORDER — PROPOFOL 10 MG/ML
VIAL (ML) INTRAVENOUS AS NEEDED
Status: DISCONTINUED | OUTPATIENT
Start: 2024-08-28 | End: 2024-08-28 | Stop reason: SURG

## 2024-08-28 RX ORDER — SODIUM CHLORIDE 9 MG/ML
70 INJECTION, SOLUTION INTRAVENOUS CONTINUOUS PRN
Status: DISCONTINUED | OUTPATIENT
Start: 2024-08-28 | End: 2024-08-28 | Stop reason: HOSPADM

## 2024-08-28 RX ORDER — KETAMINE HCL IN NACL, ISO-OSM 100MG/10ML
SYRINGE (ML) INJECTION AS NEEDED
Status: DISCONTINUED | OUTPATIENT
Start: 2024-08-28 | End: 2024-08-28 | Stop reason: SURG

## 2024-08-28 RX ADMIN — Medication 25 MG: at 09:46

## 2024-08-28 RX ADMIN — PROPOFOL 300 MG: 10 INJECTION, EMULSION INTRAVENOUS at 09:33

## 2024-08-28 RX ADMIN — SODIUM CHLORIDE: 9 INJECTION, SOLUTION INTRAVENOUS at 09:24

## 2024-08-28 RX ADMIN — Medication 25 MG: at 09:33

## 2024-08-28 RX ADMIN — GLYCOPYRROLATE 0.2 MCG: 0.2 INJECTION, SOLUTION INTRAMUSCULAR; INTRAVENOUS at 09:51

## 2024-08-28 RX ADMIN — LIDOCAINE HYDROCHLORIDE 60 MG: 20 INJECTION, SOLUTION INTRAVENOUS at 09:33

## 2024-08-28 NOTE — H&P
[facility]  HISTORY AND PHYSICAL    Patient Name: Mohsen Bucio  : 1950  MRN: [id number]    Chief Complaint:   For EGD/surveillance colonoscopy    History Of Presenting Illness:      GERD   H/o multiple adenomatous polyps  2023; more than 10  Past Medical History:   Diagnosis Date    Abnormal EKG 2024    Allergic 1965    Pollen, weeds, mold, dust    Asthma     Bronchitis     Bursitis     Chest pain     pt denies 23    Colon polyp 2016    colonoscopy     Degenerative disc disease, cervical     GERD (gastroesophageal reflux disease)     Graves disease     Heart palpitations     Hyperlipidemia     Hypertension     Hyperthyroidism     Myalgia     Myositis     Neuroma of foot     PAD (peripheral artery disease) 2024    Sleep apnea     Use CPAP machine    Stage 2 chronic kidney disease 2023    Tennis elbow     Urinary tract infection        Past Surgical History:   Procedure Laterality Date    ADENOIDECTOMY  1969    APPENDECTOMY      CATARACT EXTRACTION W/ INTRAOCULAR LENS IMPLANT Left 2024    Procedure: CATARACT PHACO EXTRACTION WITH INTRAOCULAR LENS IMPLANT LEFT;  Surgeon: Ean Carlisle MD;  Location: [facility] OR;  Service: Ophthalmology;  Laterality: Left;    CATARACT EXTRACTION W/ INTRAOCULAR LENS IMPLANT Right 2024    Procedure: CATARACT PHACO EXTRACTION WITH INTRAOCULAR LENS IMPLANT RIGHT;  Surgeon: Ean Carlisle MD;  Location: [facility] OR;  Service: Ophthalmology;  Laterality: Right;    COLONOSCOPY  2016    COLONOSCOPY N/A 8/15/2023    Procedure: COLONOSCOPY WITH POLYPECTOMY;  Surgeon: Kennedy Canas MD;  Location: [facility] ENDOSCOPY;  Service: Gastroenterology;  Laterality: N/A;    INGUINAL HERNIA REPAIR Left     x2 no mesh    TONSILLECTOMY         Social History     Socioeconomic History    Marital status:    Tobacco Use    Smoking status: Never     Passive exposure: Never    Smokeless tobacco:  Never   Vaping Use    Vaping status: Never Used   Substance and Sexual Activity    Alcohol use: Yes     Alcohol/week: 14.0 standard drinks of alcohol     Types: 14 Shots of liquor per week     Comment: 2 shots daily    Drug use: Never    Sexual activity: Yes     Partners: Female     Birth control/protection: Post-menopausal       Family History   Problem Relation Age of Onset    Benign prostatic hyperplasia Father     Stroke Father     Cancer Father         lung    Hyperlipidemia Father     Colon cancer Paternal Grandfather         diagnosed mid 70's    Stroke Other     Cancer Other        Prior to Admission Medications:  Medications Prior to Admission   Medication Sig Dispense Refill Last Dose    Bempedoic Acid 180 MG tablet Take 1 tablet by mouth Daily. 90 tablet 3 Past Week    Breo Ellipta 100-25 MCG/INH inhaler Inhale 1 puff Daily.   8/27/2024    Cyanocobalamin 2500 MCG sublingual tablet Daily 90 each 3 Past Month    desloratadine (CLARINEX) 5 MG tablet Take 1 tablet by mouth Daily As Needed.   Past Month    Evolocumab (REPATHA) solution prefilled syringe injection Inject 1 mL under the skin into the appropriate area as directed Every 14 (Fourteen) Days. 6 mL 3 Past Month    famotidine (PEPCID) 40 MG tablet TAKE 1 TABLET BY MOUTH DAILY 90 tablet 1 8/27/2024    fluticasone (FLONASE) 50 MCG/ACT nasal spray 2 sprays into the nostril(s) as directed by provider Daily. 16 g 3 8/27/2024    lisinopril (PRINIVIL,ZESTRIL) 5 MG tablet Take 1 tablet by mouth Daily. 90 tablet 3 8/27/2024    sodium-potassium-magnesium sulfates (SUPREP) 17.5-3.13-1.6 GM/177ML solution oral solution    8/28/2024    albuterol 108 (90 Base) MCG/ACT inhaler Inhale 2 puffs Every 4 (Four) Hours As Needed for Wheezing.   More than a month       Allergies:  Allergies   Allergen Reactions    Atorvastatin Other (See Comments)     Muscle cramps    Pitavastatin Other (See Comments)     Muscle cramps    Pravastatin Other (See Comments)     Muscle cramps     Rosuvastatin Other (See Comments)     Muscle cramps    Simvastatin Other (See Comments)     Muscle cramps    Zetia [Ezetimibe] Myalgia    Penicillins Other (See Comments)     Allergy testing years ago showed patient allergic        Vitals: Temp:  [97.6 °F (36.4 °C)] 97.6 °F (36.4 °C)  Heart Rate:  [80] 80  Resp:  [17] 17  BP: (138)/(88) 138/88    Review Of Systems:  Constitutional:  Negative for chills, fever, and unexpected weight change.  Respiratory:  Negative for cough, chest tightness, shortness of breath, and wheezing.  Cardiovascular:  Negative for chest pain, palpitations, and leg swelling.  Gastrointestinal:  Negative for abdominal distention, abdominal pain, nausea, vomiting.  Neurological:  Negative for weakness, numbness, and headaches.     Physical Exam:    General Appearance:  Alert, cooperative, in no acute distress.   Lungs:   Clear to auscultation, respirations regular, even and                 unlabored.   Heart:  Regular rhythm and normal rate.   Abdomen:   Normal bowel sounds, no masses, no organomegaly. Soft, nontender, nondistended   Neurologic: Alert and oriented x 3. Moves all four limbs equally       Assessment & Plan     Assessment:  Principal Problem:    Adenomatous polyp of colon  Active Problems:    Gastroesophageal reflux disease without esophagitis    Personal history of colonic polyps      Plan: ESOPHAGOGASTRODUODENOSCOPY (N/A), COLONOSCOPY (N/A)     Kennedy Canas MD  8/28/2024

## 2024-08-28 NOTE — ANESTHESIA POSTPROCEDURE EVALUATION
Patient: Mohsen Bucio    Procedure Summary       Date: 08/28/24 Room / Location: [facility] ENDOSCOPY 2 / [facility] ENDOSCOPY    Anesthesia Start: 0924 Anesthesia Stop: 1012    Procedures:       ESOPHAGOGASTRODUODENOSCOPY WITH BIOPSY (Esophagus)      COLONOSCOPY with polypectomy (Anus) Diagnosis:       Gastroesophageal reflux disease without esophagitis      Personal history of colonic polyps      (Gastroesophageal reflux disease without esophagitis [K21.9])      (Personal history of colonic polyps [Z86.010])    Surgeons: Kennedy Canas MD Provider: Ean Whitney CRNA    Anesthesia Type: MAC ASA Status: 3            Anesthesia Type: MAC    Vitals  Vitals Value Taken Time   /82 08/28/24 1036   Temp 99.9 °F (37.7 °C) 08/28/24 1016   Pulse 85 08/28/24 1036   Resp 14 08/28/24 1036   SpO2 95 % 08/28/24 1036           Post Anesthesia Care and Evaluation    Patient location during evaluation: PHASE II  Patient participation: complete - patient participated  Level of consciousness: awake  Pain score: 1  Pain management: adequate    Airway patency: patent  Anesthetic complications: No anesthetic complications  PONV Status: controlled  Cardiovascular status: acceptable and stable  Respiratory status: acceptable  Hydration status: acceptable    Comments: See Nursing record for post procedural Vital Signs as per protocol.

## 2024-08-28 NOTE — DISCHARGE INSTRUCTIONS
- Discharge patient to home (ambulatory).   - Resume previous diet.   - Follow an antireflux regimen.   - PPI daily  - Continue present medications.   - Await pathology results.   - Repeat colonoscopy in 3 years for surveillance.   - Return to GI office in 8 weeks.  Rest today  No pushing,pulling,tugging,heavy lifting, or strenuous activity   No major decision making,driving,or drinking alcoholic beverages for 24 hours due to the sedation you received  Always use good hand hygiene/washing technique  No driving on pain medication.    To assist you in voiding:  Drink plenty of fluids  Listen to running water while attempting to void.    If you are unable to urinate and you have an uncomfortable urge to void or it has been   6 hours since you were discharged, return to the Emergency Room

## 2024-08-30 LAB — REF LAB TEST METHOD: NORMAL

## 2024-09-24 ENCOUNTER — OFFICE VISIT (OUTPATIENT)
Dept: CARDIOLOGY | Facility: CLINIC | Age: 74
End: 2024-09-24
Payer: MEDICARE

## 2024-09-24 VITALS
WEIGHT: 218.4 LBS | BODY MASS INDEX: 42.88 KG/M2 | HEART RATE: 81 BPM | OXYGEN SATURATION: 97 % | SYSTOLIC BLOOD PRESSURE: 128 MMHG | HEIGHT: 60 IN | DIASTOLIC BLOOD PRESSURE: 72 MMHG

## 2024-09-24 DIAGNOSIS — N18.2 STAGE 2 CHRONIC KIDNEY DISEASE: ICD-10-CM

## 2024-09-24 DIAGNOSIS — I10 PRIMARY HYPERTENSION: ICD-10-CM

## 2024-09-24 DIAGNOSIS — R00.2 PALPITATIONS: ICD-10-CM

## 2024-09-24 DIAGNOSIS — I20.9 ANGINA PECTORIS: Primary | ICD-10-CM

## 2024-09-24 DIAGNOSIS — E78.2 MIXED HYPERLIPIDEMIA: ICD-10-CM

## 2024-09-24 PROCEDURE — 3078F DIAST BP <80 MM HG: CPT | Performed by: INTERNAL MEDICINE

## 2024-09-24 PROCEDURE — 99214 OFFICE O/P EST MOD 30 MIN: CPT | Performed by: INTERNAL MEDICINE

## 2024-09-24 PROCEDURE — 3074F SYST BP LT 130 MM HG: CPT | Performed by: INTERNAL MEDICINE

## 2024-09-25 LAB
ALBUMIN SERPL-MCNC: 4.4 G/DL (ref 3.5–5.2)
ALBUMIN/GLOB SERPL: 1.5 G/DL
ALP SERPL-CCNC: 45 U/L (ref 39–117)
ALT SERPL-CCNC: 27 U/L (ref 1–41)
AST SERPL-CCNC: 21 U/L (ref 1–40)
BILIRUB SERPL-MCNC: 0.6 MG/DL (ref 0–1.2)
BUN SERPL-MCNC: 14 MG/DL (ref 8–23)
BUN/CREAT SERPL: 10.9 (ref 7–25)
CALCIUM SERPL-MCNC: 9.6 MG/DL (ref 8.6–10.5)
CHLORIDE SERPL-SCNC: 99 MMOL/L (ref 98–107)
CHOLEST SERPL-MCNC: 149 MG/DL (ref 0–200)
CO2 SERPL-SCNC: 26.4 MMOL/L (ref 22–29)
CREAT SERPL-MCNC: 1.28 MG/DL (ref 0.76–1.27)
EGFRCR SERPLBLD CKD-EPI 2021: 59.5 ML/MIN/1.73
GLOBULIN SER CALC-MCNC: 2.9 GM/DL
GLUCOSE SERPL-MCNC: 97 MG/DL (ref 65–99)
HBA1C MFR BLD: 5.6 % (ref 4.8–5.6)
HDLC SERPL-MCNC: 35 MG/DL (ref 40–60)
LDLC SERPL CALC-MCNC: 84 MG/DL (ref 0–100)
POTASSIUM SERPL-SCNC: 4.8 MMOL/L (ref 3.5–5.2)
PROT SERPL-MCNC: 7.3 G/DL (ref 6–8.5)
SODIUM SERPL-SCNC: 137 MMOL/L (ref 136–145)
TRIGL SERPL-MCNC: 172 MG/DL (ref 0–150)
VLDLC SERPL CALC-MCNC: 30 MG/DL (ref 5–40)

## 2024-09-30 ENCOUNTER — OFFICE VISIT (OUTPATIENT)
Dept: INTERNAL MEDICINE | Facility: CLINIC | Age: 74
End: 2024-09-30
Payer: MEDICARE

## 2024-09-30 VITALS
HEIGHT: 71 IN | HEART RATE: 101 BPM | BODY MASS INDEX: 30.66 KG/M2 | WEIGHT: 219 LBS | TEMPERATURE: 98.2 F | SYSTOLIC BLOOD PRESSURE: 122 MMHG | OXYGEN SATURATION: 95 % | DIASTOLIC BLOOD PRESSURE: 70 MMHG

## 2024-09-30 DIAGNOSIS — Z78.9 STATIN INTOLERANCE: ICD-10-CM

## 2024-09-30 DIAGNOSIS — I73.9 PAD (PERIPHERAL ARTERY DISEASE): ICD-10-CM

## 2024-09-30 DIAGNOSIS — I10 PRIMARY HYPERTENSION: ICD-10-CM

## 2024-09-30 DIAGNOSIS — E78.2 MIXED HYPERLIPIDEMIA: ICD-10-CM

## 2024-09-30 DIAGNOSIS — N18.1 STAGE 1 CHRONIC KIDNEY DISEASE: ICD-10-CM

## 2024-09-30 DIAGNOSIS — K21.00 GASTROESOPHAGEAL REFLUX DISEASE WITH ESOPHAGITIS WITHOUT HEMORRHAGE: ICD-10-CM

## 2024-09-30 DIAGNOSIS — R53.83 OTHER FATIGUE: Primary | ICD-10-CM

## 2024-09-30 PROCEDURE — 1160F RVW MEDS BY RX/DR IN RCRD: CPT | Performed by: NURSE PRACTITIONER

## 2024-09-30 PROCEDURE — 3074F SYST BP LT 130 MM HG: CPT | Performed by: NURSE PRACTITIONER

## 2024-09-30 PROCEDURE — 1159F MED LIST DOCD IN RCRD: CPT | Performed by: NURSE PRACTITIONER

## 2024-09-30 PROCEDURE — 3078F DIAST BP <80 MM HG: CPT | Performed by: NURSE PRACTITIONER

## 2024-09-30 PROCEDURE — 1126F AMNT PAIN NOTED NONE PRSNT: CPT | Performed by: NURSE PRACTITIONER

## 2024-09-30 PROCEDURE — 99214 OFFICE O/P EST MOD 30 MIN: CPT | Performed by: NURSE PRACTITIONER

## 2024-09-30 PROCEDURE — G2211 COMPLEX E/M VISIT ADD ON: HCPCS | Performed by: NURSE PRACTITIONER

## 2024-09-30 NOTE — PROGRESS NOTES
"     Office Visit      Patient Name: Mohsen Bucio  : 1950   MRN: [id number]     Chief Complaint:    Chief Complaint   Patient presents with    Hypertension       History of Present Illness: Mohsen Bucio is a 74 y.o. male who is here today for follow up of HTN, discuss labs.      CMP, lipid panel, A1c drawn on 2024.  A1c 5.6.  Lipid panel normal with the exception of slightly elevated triglycerides.  Taking Repatha as prescribed.  Statin intolerance.  Creatinine elevated for the first time in a year at 0.28, EGFR 59.5.  Recent EGD with Dr. Canas demonstrated esophagitis.  PPI restarted, taking pantoprazole 40 mg EC daily as prescribed. He was taking omeprazole prior to creatinine being elevated in the past. Worried this may affect renal function.  Was taking famotidine regularly prior to EGD.    Felt fatigued, wasn't able to walk as far as he normally did in . Went from 17 minute mile to 20 minute mile and noticed he couldn't walk as far.  Blamed it on allergies.  Looked at his EKG on his watch, looked \"squirrelly\".  Went to cardiologist.  EKG and Holter monitor looked ok.  Stress test scheduled tomorrow.  He has felt better in the past week.     HTN.  Taking lisinopril 5 mg daily as prescribed.  Blood pressure normal when checked at home.  Eats a heart healthy diet. Denies chest pain, dyspnea, orthopnea, palpitations, lower extremity edema, confusion, headaches, weakness, visual disturbances.    Bilateral cataracts removed earlier this summer.  Seeing well.      Colonoscopy in .      Gained 4-5 pounds in  without change in appetite, diet.  Occasional constipation.  History of Graves' disease.    Subjective      I have reviewed and the following portions of the patient's history were updated as appropriate: past family history, past medical history, past social history, past surgical history and problem list.      Current Outpatient Medications:     albuterol 108 (90 Base) " "MCG/ACT inhaler, Inhale 2 puffs Every 4 (Four) Hours As Needed for Wheezing., Disp: , Rfl:     Breo Ellipta 100-25 MCG/INH inhaler, Inhale 1 puff Daily., Disp: , Rfl:     Cyanocobalamin 2500 MCG sublingual tablet, Daily, Disp: 90 each, Rfl: 3    desloratadine (CLARINEX) 5 MG tablet, Take 1 tablet by mouth Daily As Needed., Disp: , Rfl:     Evolocumab (REPATHA) solution prefilled syringe injection, Inject 1 mL under the skin into the appropriate area as directed Every 14 (Fourteen) Days., Disp: 6 mL, Rfl: 3    fluticasone (FLONASE) 50 MCG/ACT nasal spray, 2 sprays into the nostril(s) as directed by provider Daily., Disp: 16 g, Rfl: 3    lisinopril (PRINIVIL,ZESTRIL) 5 MG tablet, Take 1 tablet by mouth Daily., Disp: 90 tablet, Rfl: 3    pantoprazole (PROTONIX) 40 MG EC tablet, Take 1 tablet by mouth Daily. Indications: Esophagus Inflammation with Erosion, Disp: 30 tablet, Rfl: 5    sodium-potassium-magnesium sulfates (SUPREP) 17.5-3.13-1.6 GM/177ML solution oral solution, , Disp: , Rfl:     Allergies   Allergen Reactions    Atorvastatin Other (See Comments)     Muscle cramps    Pitavastatin Other (See Comments)     Muscle cramps    Pravastatin Other (See Comments)     Muscle cramps    Rosuvastatin Other (See Comments)     Muscle cramps    Simvastatin Other (See Comments)     Muscle cramps    Zetia [Ezetimibe] Myalgia    Penicillins Other (See Comments)     Allergy testing years ago showed patient allergic       Objective     Physical Exam:  Vital Signs:   Vitals:    09/30/24 1325   BP: 122/70   Pulse: 101   Temp: 98.2 °F (36.8 °C)   SpO2: 95%   Weight: 99.3 kg (219 lb)   Height: 180.3 cm (71\")     Body mass index is 30.54 kg/m².         Physical Exam  Constitutional:       Appearance: He is not ill-appearing.   HENT:      Head: Normocephalic.      Right Ear: External ear normal.      Left Ear: External ear normal.   Eyes:      Conjunctiva/sclera: Conjunctivae normal.      Pupils: Pupils are equal, round, and reactive " to light.   Cardiovascular:      Rate and Rhythm: Normal rate and regular rhythm.      Pulses:           Radial pulses are 2+ on the right side and 2+ on the left side.        Dorsalis pedis pulses are 2+ on the right side and 2+ on the left side.      Heart sounds: Normal heart sounds.   Pulmonary:      Effort: Pulmonary effort is normal.      Breath sounds: Normal breath sounds.   Musculoskeletal:      Cervical back: Normal range of motion and neck supple.   Skin:     General: Skin is warm.      Capillary Refill: Capillary refill takes less than 2 seconds.   Neurological:      Mental Status: He is alert and oriented to person, place, and time.      Coordination: Coordination normal.      Gait: Gait normal.   Psychiatric:         Attention and Perception: Attention normal.         Mood and Affect: Mood and affect normal.         Speech: Speech normal.         Behavior: Behavior normal.             Assessment / Plan      Assessment/Plan:   Diagnoses and all orders for this visit:    1. Other fatigue (Primary)  -     TSH  -     T4, Free  -     Vitamin D,25-Hydroxy    2. Body mass index (BMI) 30.0-30.9, adult  -     Vitamin D,25-Hydroxy    3. Statin intolerance        - Continue Repatha    4. Primary hypertension  5. PAD (peripheral artery disease)  6. Mixed hyperlipidemia        - Follow heart healthy diet.  Keep sodium intake < 1500 mg per day.  Avoid processed & fast foods.          - Exercise as tolerated, with a goal of 30 minutes of moderate exercise most days.         - Take medications as prescribed.    7. Gastroesophageal reflux disease with esophagitis without hemorrhage        - Avoid triggers such as spicy or greasy food, alcohol, chocolate, caffeine, carbonated beverages, tomatoes and tomato sauces, onions, smoking        - May raise HOB 30 degrees with risers or blocks, if desired        - Do not eat within 2-3 hours of lying down        - Avoid clothing, belts that constrict midsection        - Losing  weight may reduce symptoms        - Protonix as prescribed by GI    8. Stage 1 chronic kidney disease       - Will continue to monitor with future labs.             Follow Up:   Return in about 6 months (around 3/30/2025) for Medicare Wellness.    Patient was given instructions and counseling regarding his condition or for health maintenance advice. Please see specific information pulled into the AVS if appropriate.       [facility] [address] [facility]     Please note that portions of this note may have been completed with a voice recognition program. Efforts were made to edit dictation, but occasionally words are mistranscribed.

## 2024-10-01 ENCOUNTER — PATIENT MESSAGE (OUTPATIENT)
Dept: INTERNAL MEDICINE | Facility: CLINIC | Age: 74
End: 2024-10-01
Payer: MEDICARE

## 2024-10-01 ENCOUNTER — TELEPHONE (OUTPATIENT)
Dept: INTERNAL MEDICINE | Facility: CLINIC | Age: 74
End: 2024-10-01
Payer: MEDICARE

## 2024-10-01 DIAGNOSIS — I10 PRIMARY HYPERTENSION: Primary | ICD-10-CM

## 2024-10-01 DIAGNOSIS — R79.89 ELEVATED SERUM CREATININE: ICD-10-CM

## 2024-10-01 LAB
25(OH)D3+25(OH)D2 SERPL-MCNC: 34.4 NG/ML (ref 30–100)
T4 FREE SERPL-MCNC: 1.26 NG/DL (ref 0.92–1.68)
TSH SERPL DL<=0.005 MIU/L-ACNC: 0.33 UIU/ML (ref 0.27–4.2)

## 2024-10-01 NOTE — PROGRESS NOTES
Vitamin D is on the low side of normal.  Consider supplementing with daily over-the-counter vitamin D3 supplement, 2000 IU, during the fall and winter months.  Take with calcium to enhance absorption.  Sit with face and forearms exposed to sunlight or full-spectrum light for 15 minutes a day to absorb vitamin D through the skin.  Thyroid labs are normal.

## 2024-10-02 NOTE — TELEPHONE ENCOUNTER
From: Mhosen Bucio  To: Ronel Hyatt  Sent: 10/1/2024 7:48 AM EDT  Subject: Bloodwork    Sorry- forgot to ask about rechecking kidney function after taking pantoprozole for a while. I have an appointment with the gastroenterologist in late November and would like to see if I need to talk about options to that medication. Thanks!

## 2024-11-12 LAB
BUN SERPL-MCNC: 15 MG/DL (ref 8–23)
BUN/CREAT SERPL: 11.8 (ref 7–25)
CALCIUM SERPL-MCNC: 10 MG/DL (ref 8.6–10.5)
CHLORIDE SERPL-SCNC: 102 MMOL/L (ref 98–107)
CO2 SERPL-SCNC: 26.9 MMOL/L (ref 22–29)
CREAT SERPL-MCNC: 1.27 MG/DL (ref 0.76–1.27)
EGFRCR SERPLBLD CKD-EPI 2021: 60 ML/MIN/1.73
GLUCOSE SERPL-MCNC: 104 MG/DL (ref 65–99)
POTASSIUM SERPL-SCNC: 5.7 MMOL/L (ref 3.5–5.2)
SODIUM SERPL-SCNC: 139 MMOL/L (ref 136–145)

## 2024-11-13 DIAGNOSIS — E87.5 HYPERKALEMIA: Primary | ICD-10-CM

## 2024-11-14 LAB — POTASSIUM SERPL-SCNC: 4.9 MMOL/L (ref 3.5–5.2)

## 2024-11-27 ENCOUNTER — OFFICE VISIT (OUTPATIENT)
Dept: GASTROENTEROLOGY | Facility: CLINIC | Age: 74
End: 2024-11-27
Payer: MEDICARE

## 2024-11-27 VITALS
HEIGHT: 71 IN | DIASTOLIC BLOOD PRESSURE: 90 MMHG | TEMPERATURE: 97.1 F | OXYGEN SATURATION: 98 % | WEIGHT: 226 LBS | BODY MASS INDEX: 31.64 KG/M2 | HEART RATE: 68 BPM | SYSTOLIC BLOOD PRESSURE: 140 MMHG

## 2024-11-27 DIAGNOSIS — D12.6 ADENOMATOUS POLYP OF COLON, UNSPECIFIED PART OF COLON: ICD-10-CM

## 2024-11-27 DIAGNOSIS — K21.00 GASTROESOPHAGEAL REFLUX DISEASE WITH ESOPHAGITIS WITHOUT HEMORRHAGE: Primary | Chronic | ICD-10-CM

## 2024-11-27 PROCEDURE — 3077F SYST BP >= 140 MM HG: CPT | Performed by: NURSE PRACTITIONER

## 2024-11-27 PROCEDURE — 3080F DIAST BP >= 90 MM HG: CPT | Performed by: NURSE PRACTITIONER

## 2024-11-27 PROCEDURE — 1159F MED LIST DOCD IN RCRD: CPT | Performed by: NURSE PRACTITIONER

## 2024-11-27 PROCEDURE — 1160F RVW MEDS BY RX/DR IN RCRD: CPT | Performed by: NURSE PRACTITIONER

## 2024-11-27 PROCEDURE — 99213 OFFICE O/P EST LOW 20 MIN: CPT | Performed by: NURSE PRACTITIONER

## 2024-11-27 RX ORDER — PANTOPRAZOLE SODIUM 20 MG/1
TABLET, DELAYED RELEASE ORAL
Qty: 90 TABLET | Refills: 3 | Status: SHIPPED | OUTPATIENT
Start: 2024-11-27

## 2024-11-27 NOTE — PROGRESS NOTES
Follow Up Note     Date: 2024   Patient Name: Mohsen Bucio  MRN: [id number]  : 1950     Primary Care Provider: Ronel Hyatt APRN     Chief Complaint   Patient presents with    Follow-up     After procedures     2024  History of Present Illness  The patient is a 74-year-old male here for a follow-up to discuss procedure results.    He reports no complications following his recent procedures. He is currently taking pantoprazole in the morning, having switched from Pepcid. Reflux is reasonably controlled. Denies difficulty swallowing.      Interval History:  2023  Mohsen Bucio is a 73 y.o. male who is here today for follow up after colonoscopy. He did not have any problems after his procedure. Reflux well controlled. No difficulty swallowing. Denies any GI bleeding.      2023  The patient denies recent change in bowel habits. There is no diarrhea or constipation. There is no history of abdominal pain. There is no history of overt GI bleed (hematemesis melena or hematochezia). The patient denies nausea or vomiting. There is a history of occasional reflux that is reasonably controlled with Pepcid 40 mg as needed. The patient denies dysphagia or odynophagia. There is no history of recent significant weight loss. There is no history of liver disease in the past. There is a family history of colon cancer in his grandfather diagnosed in his mid 70's. No other family history of GI malignancy. The patient's last colonoscopy was in  by Dr. Santamaria with polyp removed.      Subjective      Past Medical History:   Diagnosis Date    Abnormal EKG 2024    Adenomatous colon polyp 2023    Adenomatous colon polyp 2024    Allergic 1965    Pollen, weeds, mold, dust    Asthma     Bronchitis     Bursitis     Chest pain     pt denies 23    Colon polyp 2016    colonoscopy 2016    Degenerative disc disease, cervical     GERD (gastroesophageal reflux disease) 2019    Graves  disease     Heart palpitations     Hyperlipidemia     Hypertension March 20211    Hyperthyroidism     Myalgia     Myositis     Neuroma of foot     PAD (peripheral artery disease) 04/11/2024    Sleep apnea 2021    Use CPAP machine    Stage 2 chronic kidney disease 01/17/2023    Tennis elbow     Urinary tract infection 2013     Past Surgical History:   Procedure Laterality Date    ADENOIDECTOMY  1969    APPENDECTOMY      CATARACT EXTRACTION W/ INTRAOCULAR LENS IMPLANT Left 5/9/2024    Procedure: CATARACT PHACO EXTRACTION WITH INTRAOCULAR LENS IMPLANT LEFT;  Surgeon: Ean Carlisle MD;  Location: [facility] OR;  Service: Ophthalmology;  Laterality: Left;    CATARACT EXTRACTION W/ INTRAOCULAR LENS IMPLANT Right 6/27/2024    Procedure: CATARACT PHACO EXTRACTION WITH INTRAOCULAR LENS IMPLANT RIGHT;  Surgeon: Ean Carlisle MD;  Location: [facility] OR;  Service: Ophthalmology;  Laterality: Right;    COLONOSCOPY  07/28/2016    COLONOSCOPY N/A 8/15/2023    Procedure: COLONOSCOPY WITH POLYPECTOMY;  Surgeon: Kennedy Canas MD;  Location: [facility] ENDOSCOPY;  Service: Gastroenterology;  Laterality: N/A;    COLONOSCOPY N/A 8/28/2024    Procedure: COLONOSCOPY with polypectomy;  Surgeon: Kennedy Canas MD;  Location: [facility] ENDOSCOPY;  Service: Gastroenterology;  Laterality: N/A;    ENDOSCOPY N/A 8/28/2024    Procedure: ESOPHAGOGASTRODUODENOSCOPY WITH BIOPSY;  Surgeon: Kennedy Canas MD;  Location: [facility] ENDOSCOPY;  Service: Gastroenterology;  Laterality: N/A;    INGUINAL HERNIA REPAIR Left     x2 no mesh    TONSILLECTOMY       Family History   Problem Relation Age of Onset    Benign prostatic hyperplasia Father     Stroke Father     Cancer Father         lung    Hyperlipidemia Father     Colon cancer Paternal Grandfather         diagnosed mid 70's    Stroke Other     Cancer Other      Social History     Socioeconomic History    Marital status:    Tobacco Use    Smoking status: Never     Passive  exposure: Never    Smokeless tobacco: Never   Vaping Use    Vaping status: Never Used   Substance and Sexual Activity    Alcohol use: Yes     Alcohol/week: 14.0 standard drinks of alcohol     Types: 14 Shots of liquor per week     Comment: 2 shots daily    Drug use: Never    Sexual activity: Yes     Partners: Female     Birth control/protection: Post-menopausal       Current Outpatient Medications:     albuterol 108 (90 Base) MCG/ACT inhaler, Inhale 2 puffs Every 4 (Four) Hours As Needed for Wheezing., Disp: , Rfl:     Breo Ellipta 100-25 MCG/INH inhaler, Inhale 1 puff Daily., Disp: , Rfl:     Cyanocobalamin 2500 MCG sublingual tablet, Daily, Disp: 90 each, Rfl: 3    desloratadine (CLARINEX) 5 MG tablet, Take 1 tablet by mouth Daily As Needed., Disp: , Rfl:     Evolocumab (REPATHA) solution prefilled syringe injection, Inject 1 mL under the skin into the appropriate area as directed Every 14 (Fourteen) Days., Disp: 6 mL, Rfl: 3    fluticasone (FLONASE) 50 MCG/ACT nasal spray, 2 sprays into the nostril(s) as directed by provider Daily., Disp: 16 g, Rfl: 3    lisinopril (PRINIVIL,ZESTRIL) 5 MG tablet, Take 1 tablet by mouth Daily., Disp: 90 tablet, Rfl: 3    sodium-potassium-magnesium sulfates (SUPREP) 17.5-3.13-1.6 GM/177ML solution oral solution, , Disp: , Rfl:     pantoprazole (PROTONIX) 20 MG EC tablet, 1 po in the am 30 minutes before breakfast., Disp: 90 tablet, Rfl: 3    Allergies   Allergen Reactions    Atorvastatin Other (See Comments)     Muscle cramps    Pitavastatin Other (See Comments)     Muscle cramps    Pravastatin Other (See Comments)     Muscle cramps    Rosuvastatin Other (See Comments)     Muscle cramps    Simvastatin Other (See Comments)     Muscle cramps    Zetia [Ezetimibe] Myalgia    Penicillins Other (See Comments)     Allergy testing years ago showed patient allergic     The following portions of the patient's history were reviewed and updated as appropriate: allergies, current medications,  "past family history, past medical history, past social history, past surgical history and problem list.  Objective     Physical Exam  Vitals and nursing note reviewed.   Constitutional:       General: He is not in acute distress.     Appearance: Normal appearance. He is well-developed.   HENT:      Head: Normocephalic and atraumatic.      Mouth/Throat:      Mouth: Mucous membranes are not pale, not dry and not cyanotic.   Eyes:      General: Lids are normal.   Neck:      Trachea: Trachea normal.   Cardiovascular:      Rate and Rhythm: Normal rate.   Pulmonary:      Effort: Pulmonary effort is normal. No respiratory distress.      Breath sounds: Normal breath sounds.   Skin:     General: Skin is warm and dry.   Neurological:      Mental Status: He is alert and oriented to person, place, and time.   Psychiatric:         Mood and Affect: Mood normal.         Speech: Speech normal.         Behavior: Behavior normal. Behavior is cooperative.       Vitals:    11/27/24 1521   BP: 140/90   Pulse: 68   Temp: 97.1 °F (36.2 °C)   SpO2: 98%   Weight: 103 kg (226 lb)   Height: 180.3 cm (71\")     Body mass index is 31.52 kg/m².     Results Review:   I reviewed the patient's new clinical results.    Orders Only on 11/13/2024   Component Date Value Ref Range Status    Potassium 11/13/2024 4.9  3.5 - 5.2 mmol/L Final   Patient Message on 10/01/2024   Component Date Value Ref Range Status    Glucose 11/12/2024 104 (H)  65 - 99 mg/dL Final    BUN 11/12/2024 15  8 - 23 mg/dL Final    Creatinine 11/12/2024 1.27  0.76 - 1.27 mg/dL Final    EGFR Result 11/12/2024 60.0 (L)  >60.0 mL/min/1.73 Final    BUN/Creatinine Ratio 11/12/2024 11.8  7.0 - 25.0 Final    Sodium 11/12/2024 139  136 - 145 mmol/L Final    Potassium 11/12/2024 5.7 (H)  3.5 - 5.2 mmol/L Final    Chloride 11/12/2024 102  98 - 107 mmol/L Final    Total CO2 11/12/2024 26.9  22.0 - 29.0 mmol/L Final    Calcium 11/12/2024 10.0  8.6 - 10.5 mg/dL Final   Office Visit on 09/30/2024 "   Component Date Value Ref Range Status    TSH 09/30/2024 0.334  0.270 - 4.200 uIU/mL Final    Free T4 09/30/2024 1.26  0.92 - 1.68 ng/dL Final    25 Hydroxy, Vitamin D 09/30/2024 34.4  30.0 - 100.0 ng/ml Final      Comprehensive Metabolic Panel (09/25/2024 08:30)    Colonoscopy dated 8/15/2023 per Dr. Canas  - Preparation of the colon was fair.  - One 8 to 10 mm polyp in the cecum, removed with a hot snare. Resected and retrieved.  - One 5 mm polyp in the cecum, removed with a hot snare. Resected and retrieved.  - One 4 mm polyp in the cecum, removed with a hot snare. Resected and retrieved.  - One 3 to 4 mm polyp in the cecum, removed with a hot snare. Resected and retrieved.  - Four 4 to 10mm polyps in the proximal ascending colon, in the mid ascending colon and in the distal ascending colon, removed with a hot snare. Resected and retrieved.  - One 8 mm polyp at the hepatic flexure, removed with a hot snare. Resected and retrieved.  - Five 4 to 6 mm polyps at the hepatic flexure, removed with a hot snare. Resected and retrieved.  - Two 5 to 6 mm polyps in the mid transverse colon and in the distal transverse colon, removed with a hot snare. Resected and retrieved.  - Diverticulosis in the sigmoid colon and in the descending colon.  - Small Non-bleeding external and internal hemorrhoids.  - Redundant colon- sigmoid / DC  A.     TRANSVERSE COLON POLYP, X2:  Tubular adenoma, multiple fragments  Negative for high-grade dysplasia/malignancy  B.     HEPATIC FLEXURE POLYP, X6:  Tubular adenoma, multiple fragments  Negative for high-grade dysplasia/malignancy  C.     ASCENDING COLON POLYP, X4:  Tubular adenoma, multiple fragments  Negative for high-grade dysplasia/malignancy  D.     CECUM POLYP, X4:  Tubular adenoma, multiple fragments  Negative for high-grade dysplasia/malignancy     EGD 8/28/2024 per Dr. Canas  - Normal oropharynx.  - Z-line variable, 36 cm from the incisors.  - LA Grade A reflux esophagitis with  no bleeding. Biopsied to rule out Leyva's.  - 2 cm hiatal hernia with mild GEJ esophageal ring.  - Erythematous mucosa in the antrum and prepyloric region of the stomach. Biopsied.  - Normal duodenal bulb, first portion of the duodenum, second portion of the duodenum and third portion of the duodenum.  A.   ANTRUM AND BODY, BIOPSY:  Benign gastric mucosa, negative for significant architectural distortion,  inflammatory infiltrate, neoplasia, malignancy  Negative for intestinal metaplasia  No Helicobacter-like organisms identified on H and E  B.   GE JUNCTION:  Cardiac type gastric mucosal  Negative for intestinal metaplasia, dysplasia, neoplasia, malignancy  Negative for significant portions of squamous mucosa   COMMENT:  B.  Multiple additional deeper levels are obtained and   demonstrate similar features.     Colonoscopy 8/28/2024 per Dr. Canas  - One 4 mm polyp in the cecum, removed with a cold snare. Resected and retrieved.  - One 3 mm polyp at the ileocecal valve, removed with a cold snare. Resected and retrieved.  - One 6 mm polyp at the hepatic flexure, removed with a cold snare. Resected and retrieved.  - One 6 to 8 mm polyp in the proximal descending colon, removed with a cold snare. Resected and retrieved.  - Diverticulosis in the sigmoid colon and in the descending colon.  - Suboptimal prep left colon  C.   CECUM POLYP, X2:   Tubular adenoma, negative for high-grade dysplasia/malignancy   Sessile serrated adenoma, negative for cytologic atypia   D.  HEPATIC FLEXURE POLYP:   Tubular adenoma   Negative for high-grade dysplasia/malignancy   E.   DESCENDING COLON POLYP:   Tubular adenoma   Negative for high-grade dysplasia/malignancy     Assessment / Plan      1. Gastroesophageal reflux disease with esophagitis without hemorrhage  Reflux reasonably controlled with pantoprazole 40 mg daily.  No difficulty swallowing.  EGD dated 8/28/2024 with LA grade a reflux esophagitis with no bleeding.  Biopsies  negative for Leyva's.  Antireflux measures.  Will decrease to pantoprazole 20 mg daily.    - pantoprazole (PROTONIX) 20 MG EC tablet; 1 po in the am 30 minutes before breakfast.  Dispense: 90 tablet; Refill: 3    2. Adenomatous polyp of colon, unspecified part of colon  Colonoscopy dated 8/28/2024 with polyps removed, tubular adenomas without dysplasia.  Suboptimal prep left colon.  There is a family history of colon cancer in his grandfather diagnosed in his mid 70s.  Repeat colonoscopy in 3 years for surveillance, August 2027.    Patient Instructions   Antireflux measures: Avoid fried, fatty foods, alcohol, chocolate, coffee, tea,  soft drinks, all carbonated beverages (including sparkling water), peppermint and spearmint, spicy foods, tomatoes and tomato based foods, onion based foods, and garlic.   Other antireflux measures include weight reduction if overweight, avoiding tight clothing around the abdomen, elevating the head of the bed 6 inches with blocks under the head board, and don't drink or eat before going to bed and avoid lying down immediately after meals.  Pantoprazole 20 mg 1 po every morning 30 minutes before breakfast.  Continue to avoid vaping/smoking.   High fiber, low fat diet with liberal water intake.   Metamucil 1 packet/scoop daily or fiber gummies/capsules 2-4 per day.   Colonoscopy for surveillance in 3 years, August 2027.   Follow up: The patient will call back    LUCITA Elliott  11/27/2024    Please note that portions of this note were completed with a voice recognition program.     Patient or patient representative verbalized consent for the use of Ambient Listening during the visit with  LUCITA Elliott for chart documentation. 11/27/2024  15:31 EST

## 2024-11-27 NOTE — PATIENT INSTRUCTIONS
Antireflux measures: Avoid fried, fatty foods, alcohol, chocolate, coffee, tea,  soft drinks, all carbonated beverages (including sparkling water), peppermint and spearmint, spicy foods, tomatoes and tomato based foods, onion based foods, and garlic.   Other antireflux measures include weight reduction if overweight, avoiding tight clothing around the abdomen, elevating the head of the bed 6 inches with blocks under the head board, and don't drink or eat before going to bed and avoid lying down immediately after meals.  Pantoprazole 20 mg 1 po every morning 30 minutes before breakfast.  Continue to avoid vaping/smoking.   High fiber, low fat diet with liberal water intake.   Metamucil 1 packet/scoop daily or fiber gummies/capsules 2-4 per day.   Colonoscopy for surveillance in 3 years, August 2027.   Follow up: The patient will call back

## 2024-12-30 DIAGNOSIS — I10 PRIMARY HYPERTENSION: ICD-10-CM

## 2024-12-30 RX ORDER — LISINOPRIL 5 MG/1
5 TABLET ORAL DAILY
Qty: 90 TABLET | Refills: 3 | Status: SHIPPED | OUTPATIENT
Start: 2024-12-30

## 2025-03-31 ENCOUNTER — OFFICE VISIT (OUTPATIENT)
Dept: INTERNAL MEDICINE | Facility: CLINIC | Age: 75
End: 2025-03-31
Payer: MEDICARE

## 2025-03-31 VITALS
DIASTOLIC BLOOD PRESSURE: 76 MMHG | BODY MASS INDEX: 31.22 KG/M2 | OXYGEN SATURATION: 95 % | HEART RATE: 87 BPM | SYSTOLIC BLOOD PRESSURE: 122 MMHG | HEIGHT: 71 IN | WEIGHT: 223 LBS | TEMPERATURE: 98.7 F

## 2025-03-31 DIAGNOSIS — R73.9 HYPERGLYCEMIA: ICD-10-CM

## 2025-03-31 DIAGNOSIS — N18.1 STAGE 1 CHRONIC KIDNEY DISEASE: ICD-10-CM

## 2025-03-31 DIAGNOSIS — Z13.0 SCREENING FOR DISORDER OF BLOOD AND BLOOD-FORMING ORGANS: ICD-10-CM

## 2025-03-31 DIAGNOSIS — Z12.5 SCREENING FOR PROSTATE CANCER: ICD-10-CM

## 2025-03-31 DIAGNOSIS — J45.30 MILD PERSISTENT ASTHMA WITHOUT COMPLICATION: ICD-10-CM

## 2025-03-31 DIAGNOSIS — K21.00 GASTROESOPHAGEAL REFLUX DISEASE WITH ESOPHAGITIS WITHOUT HEMORRHAGE: ICD-10-CM

## 2025-03-31 DIAGNOSIS — R07.89 CHEST HEAVINESS: ICD-10-CM

## 2025-03-31 DIAGNOSIS — I73.9 PAD (PERIPHERAL ARTERY DISEASE): ICD-10-CM

## 2025-03-31 DIAGNOSIS — R53.83 OTHER FATIGUE: ICD-10-CM

## 2025-03-31 DIAGNOSIS — Z00.00 ENCOUNTER FOR SUBSEQUENT ANNUAL WELLNESS VISIT (AWV) IN MEDICARE PATIENT: Primary | ICD-10-CM

## 2025-03-31 DIAGNOSIS — Z78.9 STATIN INTOLERANCE: ICD-10-CM

## 2025-03-31 DIAGNOSIS — E78.2 MIXED HYPERLIPIDEMIA: ICD-10-CM

## 2025-03-31 DIAGNOSIS — I10 PRIMARY HYPERTENSION: ICD-10-CM

## 2025-03-31 RX ORDER — LISINOPRIL 10 MG/1
10 TABLET ORAL DAILY
Qty: 90 TABLET | Refills: 1 | Status: SHIPPED | OUTPATIENT
Start: 2025-03-31

## 2025-03-31 NOTE — ASSESSMENT & PLAN NOTE
- Advised to use albuterol before walk. If symptoms improve, likely due to asthma particularly as he has recently stopped allergy shots.

## 2025-03-31 NOTE — PROGRESS NOTES
Subjective   The ABCs of the Annual Wellness Visit  Medicare Wellness Visit      Mohsen Bucio is a 75 y.o. patient who presents for a Medicare Wellness Visit.    The following portions of the patient's history were reviewed and   updated as appropriate: allergies, current medications, past family history, past medical history, past social history, past surgical history, and problem list.    Compared to one year ago, the patient's physical   health is the same.  Compared to one year ago, the patient's mental   health is the same.    Recent Hospitalizations:  He was not admitted to the hospital during the last year.     Current Medical Providers:  Patient Care Team:  Ronel Hyatt APRN as PCP - General (Family Medicine)  Susy Hernandez APRN as Nurse Practitioner (Gastroenterology)    Outpatient Medications Prior to Visit   Medication Sig Dispense Refill    albuterol 108 (90 Base) MCG/ACT inhaler Inhale 2 puffs Every 4 (Four) Hours As Needed for Wheezing.      Breo Ellipta 100-25 MCG/INH inhaler Inhale 1 puff Daily.      Cyanocobalamin 2500 MCG sublingual tablet Daily 90 each 3    desloratadine (CLARINEX) 5 MG tablet Take 1 tablet by mouth Daily As Needed.      Evolocumab (REPATHA) solution prefilled syringe injection Inject 1 mL under the skin into the appropriate area as directed Every 14 (Fourteen) Days. 6 mL 3    fluticasone (FLONASE) 50 MCG/ACT nasal spray 2 sprays into the nostril(s) as directed by provider Daily. 16 g 3    lisinopril (PRINIVIL,ZESTRIL) 5 MG tablet TAKE 1 TABLET BY MOUTH DAILY 90 tablet 3    pantoprazole (PROTONIX) 20 MG EC tablet 1 po in the am 30 minutes before breakfast. 90 tablet 3    sodium-potassium-magnesium sulfates (SUPREP) 17.5-3.13-1.6 GM/177ML solution oral solution        No facility-administered medications prior to visit.     No opioid medication identified on active medication list. I have reviewed chart for other potential  high risk medication/s and harmful  "drug interactions in the elderly.      Aspirin is not on active medication list.  Aspirin use is not indicated based on review of current medical condition/s. Risk of harm outweighs potential benefits.  .    Patient Active Problem List   Diagnosis    Atopic rhinitis    Asthma    Chronic cough    Hypercholesterolemia    Hyperthyroidism    Hypothyroidism    Uninodular goiter    Multiple thyroid nodules    Cobalamin deficiency    Bilateral impacted cerumen    Stage 2 chronic kidney disease    Statin intolerance    Mixed hyperlipidemia    Primary hypertension    Gastroesophageal reflux disease without esophagitis    Personal history of colonic polyps    Adenomatous polyp of colon    PAD (peripheral artery disease)    Nuclear sclerotic cataract of left eye    Nuclear sclerotic cataract of right eye    Abnormal EKG     Advance Care Planning Advance Directive is not on file.  ACP discussion was held with the patient during this visit. Patient does not have an advance directive, information provided.            Objective   Vitals:    03/31/25 0811   BP: 122/76   Pulse: 87   Temp: 98.7 °F (37.1 °C)   SpO2: 95%   Weight: 101 kg (223 lb)   Height: 180.3 cm (70.98\")   PainSc: 0-No pain       Estimated body mass index is 31.12 kg/m² as calculated from the following:    Height as of this encounter: 180.3 cm (70.98\").    Weight as of this encounter: 101 kg (223 lb).    BMI is >= 30 and <35. (Class 1 Obesity). The following options were offered after discussion;: weight loss educational material (shared in after visit summary), exercise counseling/recommendations, and nutrition counseling/recommendations           Does the patient have evidence of cognitive impairment? No                                                                                                Health  Risk Assessment    Smoking Status:  Social History     Tobacco Use   Smoking Status Never    Passive exposure: Never   Smokeless Tobacco Never     Alcohol " Consumption:  Social History     Substance and Sexual Activity   Alcohol Use Yes    Alcohol/week: 14.0 standard drinks of alcohol    Types: 14 Shots of liquor per week    Comment: 2 shots daily       Fall Risk Screen  IMMANUELADI Fall Risk Assessment was completed, and patient is at LOW risk for falls.Assessment completed on:3/31/2025    Depression Screening   Little interest or pleasure in doing things? Not at all   Feeling down, depressed, or hopeless? Not at all   PHQ-2 Total Score 0      Health Habits and Functional and Cognitive Screening:      3/31/2025     8:18 AM   Functional & Cognitive Status   Do you have difficulty preparing food and eating? No   Do you have difficulty bathing yourself, getting dressed or grooming yourself? No   Do you have difficulty using the toilet? No   Do you have difficulty moving around from place to place? No   Do you have trouble with steps or getting out of a bed or a chair? No   Current Diet Well Balanced Diet   Dental Exam Up to date   Eye Exam Up to date   Exercise (times per week) 5 times per week   Current Exercises Include Walking   Do you need help using the phone?  No   Are you deaf or do you have serious difficulty hearing?  No   Do you need help to go to places out of walking distance? No   Do you need help shopping? No   Do you need help preparing meals?  No   Do you need help with housework?  No   Do you need help with laundry? No   Do you need help taking your medications? No   Do you need help managing money? No   Do you ever drive or ride in a car without wearing a seat belt? No   Have you felt unusual stress, anger or loneliness in the last month? No   Who do you live with? Spouse   If you need help, do you have trouble finding someone available to you? No   Have you been bothered in the last four weeks by sexual problems? No   Do you have difficulty concentrating, remembering or making decisions? No           Age-appropriate Screening Schedule:  Refer to the list  below for future screening recommendations based on patient's age, sex and/or medical conditions. Orders for these recommended tests are listed in the plan section. The patient has been provided with a written plan.    Health Maintenance List  Health Maintenance   Topic Date Due    DXA SCAN  Never done    MAMMOGRAM  Never done    ANNUAL WELLNESS VISIT  03/27/2025    COVID-19 Vaccine (9 - 2024-25 season) 04/18/2025    LIPID PANEL  09/25/2025    COLORECTAL CANCER SCREENING  08/28/2027    TDAP/TD VACCINES (5 - Td or Tdap) 03/21/2032    INFLUENZA VACCINE  Completed    Pneumococcal Vaccine 50+  Completed    ZOSTER VACCINE  Completed    HEPATITIS C SCREENING  Addressed                                                                                                                                                CMS Preventative Services Quick Reference  Risk Factors Identified During Encounter  Immunizations Discussed/Encouraged: RSV (Respiratory Syncytial Virus)    The above risks/problems have been discussed with the patient.  Pertinent information has been shared with the patient in the After Visit Summary.  An After Visit Summary and PPPS were made available to the patient.    Follow Up:   Next Medicare Wellness visit to be scheduled in 1 year.         Additional E&M Note during same encounter follows:  Patient has additional, significant, and separately identifiable condition(s)/problem(s) that require work above and beyond the Medicare Wellness Visit     Chief Complaint  Medicare Wellness-subsequent    Subjective   HPI  Mohsen is also being seen today for additional medical problem/s.    Has gained 14 pounds in the past year without change in diet or activity.  Denies polydipsia, polyuria, temperature intolerance, hair/skin/nail changes, bowel habit changes, palpitations, anxiety, sore throat, hoarseness.     HTN, HLD, PAD.  Blood pressure has been increased for the past month or so.  Taking 2 lisinopril tablets daily,  "for total of 10 mg daily.  BP has been well managed since increase in dose. Released from cardiology last year after normal stress test. EKG in August, 2024 NSR.  Continues to have some aching pain in leg with walking.  Typically walks 2-3 miles 5 days a week.  Feels like he is hiking at altitude on his regular walks - chest heaviness, SOA, feeling like he can't take a deep breath.     Asthma.  Uses Breo inhaler daily.  Does not use albuterol prior to exercise. Stopped taking allergy shots last week, had been taking them for a decade.  Follows Dr Cho, allergy and asthma.      ANN.  Wears CPAP.      Taking probiotic as advised after last colonoscopy.       Review of Systems   All other systems reviewed and are negative.             Objective   Vital Signs:  /76   Pulse 87   Temp 98.7 °F (37.1 °C)   Ht 180.3 cm (70.98\")   Wt 101 kg (223 lb)   SpO2 95%   BMI 31.12 kg/m²   Physical Exam  Constitutional:       Appearance: He is not ill-appearing.   HENT:      Head: Normocephalic.      Right Ear: External ear normal.      Left Ear: External ear normal.   Eyes:      Conjunctiva/sclera: Conjunctivae normal.      Pupils: Pupils are equal, round, and reactive to light.   Cardiovascular:      Rate and Rhythm: Normal rate and regular rhythm.      Heart sounds: Normal heart sounds.   Pulmonary:      Effort: Pulmonary effort is normal.      Breath sounds: Normal breath sounds.   Musculoskeletal:      Cervical back: Normal range of motion and neck supple.   Skin:     General: Skin is warm.      Capillary Refill: Capillary refill takes less than 2 seconds.   Neurological:      Mental Status: He is alert and oriented to person, place, and time.      Coordination: Coordination normal.      Gait: Gait normal.   Psychiatric:         Mood and Affect: Mood normal.         Behavior: Behavior normal.         Thought Content: Thought content normal.                    Assessment and Plan      Screening for prostate cancer     "     Primary hypertension           Encounter for subsequent annual wellness visit (AWV) in Medicare patient         PAD (peripheral artery disease)         Mixed hyperlipidemia       Orders:    Lipid Panel    Gastroesophageal reflux disease with esophagitis without hemorrhage         Stage 1 chronic kidney disease      Orders:    Comprehensive Metabolic Panel    Iron Profile    Hyperglycemia    Orders:    Hemoglobin A1c    Other fatigue    Orders:    CBC & Differential    Vitamin B12    Folate    Iron Profile    Chest heaviness    Orders:    CBC & Differential    Vitamin B12    Folate    Iron Profile    Statin intolerance         Mild persistent asthma without complication         - Advised to use albuterol before walk. If symptoms improve, likely due to asthma particularly as he has recently stopped allergy shots.                Screening for disorder of blood and blood-forming organs    Orders:    CBC & Differential    Vitamin B12    Folate    Iron Profile            Follow Up   No follow-ups on file.  Patient was given instructions and counseling regarding his condition or for health maintenance advice. Please see specific information pulled into the AVS if appropriate.

## 2025-04-01 LAB
ALBUMIN SERPL-MCNC: 4.5 G/DL (ref 3.5–5.2)
ALBUMIN/GLOB SERPL: 1.7 G/DL
ALP SERPL-CCNC: 43 U/L (ref 39–117)
ALT SERPL-CCNC: 35 U/L (ref 1–41)
AST SERPL-CCNC: 28 U/L (ref 1–40)
BASOPHILS # BLD AUTO: 0.07 10*3/MM3 (ref 0–0.2)
BASOPHILS NFR BLD AUTO: 1.2 % (ref 0–1.5)
BILIRUB SERPL-MCNC: 0.7 MG/DL (ref 0–1.2)
BUN SERPL-MCNC: 15 MG/DL (ref 8–23)
BUN/CREAT SERPL: 11.7 (ref 7–25)
CALCIUM SERPL-MCNC: 10.1 MG/DL (ref 8.6–10.5)
CHLORIDE SERPL-SCNC: 102 MMOL/L (ref 98–107)
CHOLEST SERPL-MCNC: 136 MG/DL (ref 0–200)
CO2 SERPL-SCNC: 25.8 MMOL/L (ref 22–29)
CREAT SERPL-MCNC: 1.28 MG/DL (ref 0.76–1.27)
EGFRCR SERPLBLD CKD-EPI 2021: 59.1 ML/MIN/1.73
EOSINOPHIL # BLD AUTO: 0.23 10*3/MM3 (ref 0–0.4)
EOSINOPHIL NFR BLD AUTO: 3.9 % (ref 0.3–6.2)
ERYTHROCYTE [DISTWIDTH] IN BLOOD BY AUTOMATED COUNT: 13.3 % (ref 12.3–15.4)
FOLATE SERPL-MCNC: 9.77 NG/ML (ref 4.78–24.2)
GLOBULIN SER CALC-MCNC: 2.6 GM/DL
GLUCOSE SERPL-MCNC: 103 MG/DL (ref 65–99)
HBA1C MFR BLD: 5.8 % (ref 4.8–5.6)
HCT VFR BLD AUTO: 49.8 % (ref 37.5–51)
HDLC SERPL-MCNC: 36 MG/DL (ref 40–60)
HGB BLD-MCNC: 16.3 G/DL (ref 13–17.7)
IMM GRANULOCYTES # BLD AUTO: 0.02 10*3/MM3 (ref 0–0.05)
IMM GRANULOCYTES NFR BLD AUTO: 0.3 % (ref 0–0.5)
IRON SATN MFR SERPL: 35 % (ref 20–50)
IRON SERPL-MCNC: 144 MCG/DL (ref 59–158)
LDLC SERPL CALC-MCNC: 74 MG/DL (ref 0–100)
LYMPHOCYTES # BLD AUTO: 1.6 10*3/MM3 (ref 0.7–3.1)
LYMPHOCYTES NFR BLD AUTO: 27.4 % (ref 19.6–45.3)
MCH RBC QN AUTO: 29.4 PG (ref 26.6–33)
MCHC RBC AUTO-ENTMCNC: 32.7 G/DL (ref 31.5–35.7)
MCV RBC AUTO: 89.9 FL (ref 79–97)
MONOCYTES # BLD AUTO: 0.52 10*3/MM3 (ref 0.1–0.9)
MONOCYTES NFR BLD AUTO: 8.9 % (ref 5–12)
NEUTROPHILS # BLD AUTO: 3.41 10*3/MM3 (ref 1.7–7)
NEUTROPHILS NFR BLD AUTO: 58.3 % (ref 42.7–76)
NRBC BLD AUTO-RTO: 0 /100 WBC (ref 0–0.2)
PLATELET # BLD AUTO: 318 10*3/MM3 (ref 140–450)
POTASSIUM SERPL-SCNC: 4.8 MMOL/L (ref 3.5–5.2)
PROT SERPL-MCNC: 7.1 G/DL (ref 6–8.5)
PSA SERPL-MCNC: 2.34 NG/ML (ref 0–4)
RBC # BLD AUTO: 5.54 10*6/MM3 (ref 4.14–5.8)
SODIUM SERPL-SCNC: 140 MMOL/L (ref 136–145)
TIBC SERPL-MCNC: 417 MCG/DL
TRIGL SERPL-MCNC: 150 MG/DL (ref 0–150)
UIBC SERPL-MCNC: 273 MCG/DL (ref 112–346)
VIT B12 SERPL-MCNC: 890 PG/ML (ref 211–946)
VLDLC SERPL CALC-MCNC: 26 MG/DL (ref 5–40)
WBC # BLD AUTO: 5.85 10*3/MM3 (ref 3.4–10.8)

## 2025-04-08 RX ORDER — FLUTICASONE PROPIONATE 50 MCG
2 SPRAY, SUSPENSION (ML) NASAL DAILY
Qty: 16 G | Refills: 1 | Status: SHIPPED | OUTPATIENT
Start: 2025-04-08

## 2025-05-13 ENCOUNTER — OFFICE VISIT (OUTPATIENT)
Dept: NEUROLOGY | Facility: CLINIC | Age: 75
End: 2025-05-13
Payer: MEDICARE

## 2025-05-13 VITALS
HEART RATE: 111 BPM | DIASTOLIC BLOOD PRESSURE: 85 MMHG | HEIGHT: 71 IN | TEMPERATURE: 98.4 F | OXYGEN SATURATION: 96 % | BODY MASS INDEX: 30.66 KG/M2 | WEIGHT: 219 LBS | SYSTOLIC BLOOD PRESSURE: 141 MMHG

## 2025-05-13 DIAGNOSIS — G47.33 OSA (OBSTRUCTIVE SLEEP APNEA): Primary | ICD-10-CM

## 2025-05-13 PROCEDURE — 99213 OFFICE O/P EST LOW 20 MIN: CPT | Performed by: NURSE PRACTITIONER

## 2025-05-13 PROCEDURE — 3079F DIAST BP 80-89 MM HG: CPT | Performed by: NURSE PRACTITIONER

## 2025-05-13 PROCEDURE — 1159F MED LIST DOCD IN RCRD: CPT | Performed by: NURSE PRACTITIONER

## 2025-05-13 PROCEDURE — G2211 COMPLEX E/M VISIT ADD ON: HCPCS | Performed by: NURSE PRACTITIONER

## 2025-05-13 PROCEDURE — 3077F SYST BP >= 140 MM HG: CPT | Performed by: NURSE PRACTITIONER

## 2025-05-13 PROCEDURE — 1160F RVW MEDS BY RX/DR IN RCRD: CPT | Performed by: NURSE PRACTITIONER

## 2025-05-13 NOTE — PROGRESS NOTES
Follow up Sleep Patient Office Visit      Patient Name: Mohsen Bucio  : 1950   MRN: [id number]     Referring Physician: No ref. provider found    Chief Complaint:    Chief Complaint   Patient presents with    Follow-up     Patient in office to follow up on denia.         History of Present Illness: Mohsen Bucio is a 75 y.o. adult who is here today to follow up with DENIA and was last seen on 2024.  Viewed patient's compliance report on his phone- Currently on AutoPap 6-16cm, AHI <0.8/hour (compliance report will be emailed to provider by patient).  He is tolerating his pressures well.  He feels he is sleeping better and not getting up as much during the night.  He feels more well rested upon awakening in the mornings.   *DME company- [employer]  *Mask- Full face  *Tubing- regular   *Current compliance report reviewed and has been scanned into the patient's medical record.    Following taken from previous visit note:  Mohsen Bucio is a 74 y.o. male who is here today to follow up with DENIA and was last seen on 2023.  Currently on AutoPap 6-16cm, average P95 pressure 6.9cm, compliance 84%, AHI 0.2/hour.  He is tolerating his pressures well.  He feels he is sleeping longer than he use to and getting better quality sleep.  He has no complaints or concerns today and feels he has been doing pretty good.   *DME company- [employer]   *Mask- Full face mask    Subjective      Review of Systems:   Review of Systems   Psychiatric/Behavioral:  Negative for sleep disturbance.        Medications:     Current Outpatient Medications:     albuterol 108 (90 Base) MCG/ACT inhaler, Inhale 2 puffs Every 4 (Four) Hours As Needed for Wheezing., Disp: , Rfl:     Breo Ellipta 100-25 MCG/INH inhaler, Inhale 1 puff Daily., Disp: , Rfl:     Cyanocobalamin 2500 MCG sublingual tablet, Daily, Disp: 90 each, Rfl: 3    desloratadine (CLARINEX) 5 MG tablet, Take 1 tablet by mouth Daily As Needed., Disp: , Rfl:     Evolocumab  "(REPATHA) solution prefilled syringe injection, Inject 1 mL under the skin into the appropriate area as directed Every 14 (Fourteen) Days., Disp: 6 mL, Rfl: 3    fluticasone (FLONASE) 50 MCG/ACT nasal spray, SPRAY TWO SPRAYS IN EACH NOSTRIL ONCE DAILY AS DIRECTED BY PROVIDER, Disp: 16 g, Rfl: 1    lisinopril (PRINIVIL,ZESTRIL) 10 MG tablet, Take 1 tablet by mouth Daily., Disp: 90 tablet, Rfl: 1    pantoprazole (PROTONIX) 20 MG EC tablet, 1 po in the am 30 minutes before breakfast., Disp: 90 tablet, Rfl: 3    Allergies:   Allergies   Allergen Reactions    Atorvastatin Other (See Comments)     Muscle cramps    Pitavastatin Other (See Comments)     Muscle cramps    Pravastatin Other (See Comments)     Muscle cramps    Rosuvastatin Other (See Comments)     Muscle cramps    Simvastatin Other (See Comments)     Muscle cramps    Zetia [Ezetimibe] Myalgia    Penicillins Other (See Comments)     Allergy testing years ago showed patient allergic       Objective     Physical Exam:  Vital Signs:   Vitals:    05/13/25 0852   BP: 141/85   BP Location: Left arm   Patient Position: Sitting   Cuff Size: Adult   Pulse: 111   Temp: 98.4 °F (36.9 °C)   SpO2: 96%   Weight: 99.3 kg (219 lb)   Height: 180.3 cm (70.98\")   PainSc: 0-No pain     BMI: Body mass index is 30.56 kg/m².    Physical Exam  Vitals and nursing note reviewed.   Constitutional:       General: He is not in acute distress.     Appearance: Normal appearance. He is well-developed. He is not diaphoretic.   HENT:      Head: Normocephalic and atraumatic.   Eyes:      Extraocular Movements: Extraocular movements intact.      Conjunctiva/sclera: Conjunctivae normal.   Pulmonary:      Effort: Pulmonary effort is normal. No respiratory distress.   Musculoskeletal:         General: Normal range of motion.   Skin:     General: Skin is warm and dry.   Neurological:      Mental Status: He is alert and oriented to person, place, and time.   Psychiatric:         Mood and Affect: Mood " normal.         Behavior: Behavior normal.         Thought Content: Thought content normal.         Judgment: Judgment normal.         Assessment / Plan      Assessment/Plan:   Diagnoses and all orders for this visit:    1. ANN (obstructive sleep apnea) (Primary)         Follow Up:   Return in about 1 year (around 5/13/2026) for F/U Obstructive Sleep Apnea.    *Order for PAP supplies sent to patient's [employer] company.     I have advised the patient the need to continue the use of CPAP.  Gold standard for treatment of sleep apnea includes weight loss, use of cpap and avoidance of alcohol.  Encouraged weight loss (if applicable) with a BMI goal of 24.  Untreated ANN may increase the risk for development of hypertension, stroke, myocardial infarction, diabetes, cardiovascular disease, work-related issues and driving accidents. I have counseled and advised the patient to avoid driving or operating heavy/dangerous equipment if feeling drowsy.     LUCITA Callejas, FNP-C  [facility]

## (undated) DEVICE — ENDOSCOPY PORT CONNECTOR FOR OLYMPUS® SCOPES: Brand: ERBE

## (undated) DEVICE — LUBE JELLY PK/2.75GM STRL BX/144

## (undated) DEVICE — THE MONARCH® "D" CARTRIDGE IS A SINGLE-USE POLYPROPYLENE CARTRIDGE FOR POSTERIOR CHAMBER IOL DELIVERY: Brand: MONARCH® III

## (undated) DEVICE — PATIENT RETURN ELECTRODE, SINGLE-USE, CONTACT QUALITY MONITORING, ADULT, WITH 15 FT (4.5 M) CORD. FOR PATIENTS WEIGHING OVER 33LBS. (15KG): Brand: MEGADYNE

## (undated) DEVICE — VLV SXN AIR/H2O ORCAPOD3 1P/U STRL

## (undated) DEVICE — BLD BEAVER MICROSHARP 15D 3MM BLU LF

## (undated) DEVICE — CANN IRR/INJ AIR ANT CHAMBER 6MM BEND 27G

## (undated) DEVICE — CLEARCUT® HP2 SLIT KNIFE INTREPID MICRO-COAXIAL SYSTEM 2.4 DB: Brand: CLEARCUT®; INTREPID

## (undated) DEVICE — HYBRID CO2 TUBING/CAP SET FOR OLYMPUS® SCOPES & CO2 SOURCE: Brand: ERBE

## (undated) DEVICE — HP CONCL INTREPID COAX I/A CRV .3MM

## (undated) DEVICE — QUICK CATCH IN-LINE SUCTION POLYP TRAP IS USED FOR SUCTION RETRIEVAL OF ENDOSCOPICALLY REMOVED POLYPS.

## (undated) DEVICE — Device

## (undated) DEVICE — SYR LUERLOK 5CC

## (undated) DEVICE — SOL IRRIG H2O 1000ML STRL

## (undated) DEVICE — GLV SURG SENSICARE PI LF PF 8 GRN STRL

## (undated) DEVICE — SINGLE-USE POLYPECTOMY SNARE: Brand: CAPTIVATOR II

## (undated) DEVICE — CONMED SCOPE SAVER BITE BLOCK, 20X27 MM: Brand: SCOPE SAVER

## (undated) DEVICE — HYBRID TUBING/CAP SET FOR OLYMPUS® SCOPES: Brand: ERBE

## (undated) DEVICE — FRCP BX RADJAW4 NDL 2.8 240 STD OG

## (undated) DEVICE — EYE CARE POST OP KIT: Brand: MEDLINE INDUSTRIES, INC.

## (undated) DEVICE — SHIELD CORNL PROTECT CLR UNIV